# Patient Record
Sex: FEMALE | Race: WHITE | NOT HISPANIC OR LATINO | Employment: OTHER | URBAN - METROPOLITAN AREA
[De-identification: names, ages, dates, MRNs, and addresses within clinical notes are randomized per-mention and may not be internally consistent; named-entity substitution may affect disease eponyms.]

---

## 2017-08-17 ENCOUNTER — ALLSCRIPTS OFFICE VISIT (OUTPATIENT)
Dept: OTHER | Facility: OTHER | Age: 67
End: 2017-08-17

## 2017-09-15 ENCOUNTER — ALLSCRIPTS OFFICE VISIT (OUTPATIENT)
Dept: OTHER | Facility: OTHER | Age: 67
End: 2017-09-15

## 2018-01-13 VITALS
DIASTOLIC BLOOD PRESSURE: 76 MMHG | BODY MASS INDEX: 26.8 KG/M2 | RESPIRATION RATE: 16 BRPM | SYSTOLIC BLOOD PRESSURE: 104 MMHG | TEMPERATURE: 97.1 F | WEIGHT: 157 LBS | HEIGHT: 64 IN | HEART RATE: 76 BPM

## 2018-01-15 VITALS
BODY MASS INDEX: 26.46 KG/M2 | DIASTOLIC BLOOD PRESSURE: 70 MMHG | RESPIRATION RATE: 16 BRPM | OXYGEN SATURATION: 98 % | SYSTOLIC BLOOD PRESSURE: 102 MMHG | HEART RATE: 64 BPM | TEMPERATURE: 98 F | HEIGHT: 64 IN | WEIGHT: 155 LBS

## 2018-06-12 ENCOUNTER — OFFICE VISIT (OUTPATIENT)
Dept: FAMILY MEDICINE CLINIC | Facility: CLINIC | Age: 68
End: 2018-06-12
Payer: MEDICARE

## 2018-06-12 VITALS
HEIGHT: 64 IN | SYSTOLIC BLOOD PRESSURE: 110 MMHG | BODY MASS INDEX: 24.59 KG/M2 | DIASTOLIC BLOOD PRESSURE: 70 MMHG | WEIGHT: 144 LBS | HEART RATE: 74 BPM | TEMPERATURE: 97 F | RESPIRATION RATE: 16 BRPM

## 2018-06-12 DIAGNOSIS — M79.89 MASS OF SOFT TISSUE: Primary | ICD-10-CM

## 2018-06-12 PROBLEM — I47.1 SVT (SUPRAVENTRICULAR TACHYCARDIA) (HCC): Status: ACTIVE | Noted: 2018-06-12

## 2018-06-12 PROBLEM — I47.10 SVT (SUPRAVENTRICULAR TACHYCARDIA): Status: ACTIVE | Noted: 2018-06-12

## 2018-06-12 PROBLEM — E03.9 HYPOTHYROIDISM: Status: ACTIVE | Noted: 2017-09-15

## 2018-06-12 PROBLEM — M70.62 GREATER TROCHANTERIC BURSITIS OF LEFT HIP: Status: ACTIVE | Noted: 2017-09-15

## 2018-06-12 PROCEDURE — 99214 OFFICE O/P EST MOD 30 MIN: CPT | Performed by: NURSE PRACTITIONER

## 2018-06-12 RX ORDER — NICOTINE POLACRILEX 2 MG
1 GUM BUCCAL DAILY
COMMUNITY

## 2018-06-12 RX ORDER — FLECAINIDE ACETATE 50 MG/1
TABLET ORAL
COMMUNITY
End: 2019-05-28

## 2018-06-12 RX ORDER — ALPRAZOLAM 0.25 MG/1
TABLET ORAL
COMMUNITY

## 2018-06-12 RX ORDER — LEVOTHYROXINE SODIUM 0.1 MG/1
1 TABLET ORAL DAILY
COMMUNITY
End: 2018-09-04 | Stop reason: SDUPTHER

## 2018-06-12 NOTE — PROGRESS NOTES
Assessment/Plan:    US ordered for further evaluation  Differentials include lipoma, cyst, malignancy    Problem List Items Addressed This Visit     None      Visit Diagnoses     Mass of soft tissue    -  Primary    Relevant Orders    US superficial lump (non extremity)          There are no Patient Instructions on file for this visit  Return if symptoms worsen or fail to improve  Subjective:      Patient ID: Karel Bales is a 69 y o  female  Chief Complaint   Patient presents with    Pulled Muscle     pt sts pulled muscle in her chest area  drhlpn       She choked on a french novoa back in February and her  performed the Heimlich maneuver, but grabbed her too high on her chest   Since then, she has had soreness in the muscles of her chest  They feel better to stretch her arms  Last week she noticed a lump on her chest wall that is not painful  She has been Tylenol OTC for muscle pain  The following portions of the patient's history were reviewed and updated as appropriate: allergies, current medications, past family history, past medical history, past social history, past surgical history and problem list     Review of Systems   Constitutional: Negative  Musculoskeletal:        See HPI   Skin:        See HPI   All other systems reviewed and are negative  Current Outpatient Prescriptions   Medication Sig Dispense Refill    ALPRAZolam (XANAX) 0 25 mg tablet Take by mouth daily at bedtime as needed for anxiety      bimatoprost (LUMIGAN) 0 01 % ophthalmic drops Apply 1 drop to eye Daily      Biotin 1 MG CAPS Take 1 capsule by mouth daily      flecainide (TAMBOCOR) 50 mg tablet Take by mouth        levothyroxine (SYNTHROID) 100 mcg tablet Take 1 tablet by mouth daily       No current facility-administered medications for this visit          Objective:    /70   Pulse 74   Temp (!) 97 °F (36 1 °C)   Resp 16   Ht 5' 4" (1 626 m)   Wt 65 3 kg (144 lb)   LMP 01/20/2000 BMI 24 72 kg/m²        Physical Exam   Constitutional: She appears well-developed and well-nourished  HENT:   Right Ear: Tympanic membrane, external ear and ear canal normal    Left Ear: Tympanic membrane, external ear and ear canal normal    Nose: No mucosal edema  Mouth/Throat: Oropharynx is clear and moist and mucous membranes are normal    Eyes: Conjunctivae are normal    Cardiovascular: Normal rate, regular rhythm and normal heart sounds  Pulmonary/Chest: Effort normal and breath sounds normal    Abdominal: Bowel sounds are normal  She exhibits no distension  There is no splenomegaly or hepatomegaly  There is no tenderness  Lymphadenopathy:        Right cervical: No superficial cervical adenopathy present  Left cervical: No superficial cervical adenopathy present  Skin: No rash noted  Psychiatric: She has a normal mood and affect  Nursing note and vitals reviewed               Kassandra Torres

## 2018-06-13 ENCOUNTER — HOSPITAL ENCOUNTER (OUTPATIENT)
Dept: RADIOLOGY | Facility: HOSPITAL | Age: 68
Discharge: HOME | End: 2018-06-13
Payer: MEDICARE

## 2018-06-13 DIAGNOSIS — M79.89 MASS OF SOFT TISSUE: ICD-10-CM

## 2018-06-13 PROCEDURE — 76705 ECHO EXAM OF ABDOMEN: CPT

## 2018-06-14 ENCOUNTER — TELEPHONE (OUTPATIENT)
Dept: FAMILY MEDICINE CLINIC | Facility: CLINIC | Age: 68
End: 2018-06-14

## 2018-06-14 NOTE — TELEPHONE ENCOUNTER
Spoke w/ pt, aware of US results  Discussed doing a CT for further eval, however she is does not want to go forward with that  No constitutional symptoms, no other masses  State she had labs done in [place] and CBC was normal   Recommended further imaging if this persists after a month or if swelling grows in size or for new masses, fever, night sweats, appetite changes, or weight loss   Anusha Araya

## 2018-08-17 ENCOUNTER — HOSPITAL ENCOUNTER (EMERGENCY)
Facility: HOSPITAL | Age: 68
Discharge: HOME | End: 2018-08-17
Attending: EMERGENCY MEDICINE | Admitting: EMERGENCY MEDICINE
Payer: MEDICARE

## 2018-08-17 ENCOUNTER — APPOINTMENT (EMERGENCY)
Dept: RADIOLOGY | Facility: HOSPITAL | Age: 68
End: 2018-08-17
Payer: MEDICARE

## 2018-08-17 VITALS
DIASTOLIC BLOOD PRESSURE: 72 MMHG | WEIGHT: 148 LBS | HEART RATE: 105 BPM | BODY MASS INDEX: 25.4 KG/M2 | SYSTOLIC BLOOD PRESSURE: 116 MMHG | TEMPERATURE: 96.6 F | RESPIRATION RATE: 37 BRPM | OXYGEN SATURATION: 96 %

## 2018-08-17 DIAGNOSIS — I47.1 SVT (SUPRAVENTRICULAR TACHYCARDIA) (HCC): Primary | ICD-10-CM

## 2018-08-17 LAB
ALBUMIN SERPL BCP-MCNC: 3.9 G/DL (ref 3.5–5)
ALP SERPL-CCNC: 104 U/L (ref 46–116)
ALT SERPL W P-5'-P-CCNC: 28 U/L (ref 12–78)
ANION GAP SERPL CALCULATED.3IONS-SCNC: 10 MMOL/L (ref 4–13)
APTT PPP: 26 SECONDS (ref 24–33)
AST SERPL W P-5'-P-CCNC: 22 U/L (ref 5–45)
BASOPHILS # BLD AUTO: 0.05 THOUSANDS/ΜL (ref 0–0.1)
BASOPHILS NFR BLD AUTO: 1 % (ref 0–1)
BILIRUB SERPL-MCNC: 0.3 MG/DL (ref 0.2–1)
BILIRUB UR QL STRIP: NEGATIVE
BUN SERPL-MCNC: 15 MG/DL (ref 5–25)
CALCIUM SERPL-MCNC: 9.8 MG/DL (ref 8.3–10.1)
CHLORIDE SERPL-SCNC: 106 MMOL/L (ref 100–108)
CLARITY UR: CLEAR
CO2 SERPL-SCNC: 28 MMOL/L (ref 21–32)
COLOR UR: COLORLESS
CREAT SERPL-MCNC: 0.78 MG/DL (ref 0.6–1.3)
EOSINOPHIL # BLD AUTO: 0.19 THOUSAND/ΜL (ref 0–0.61)
EOSINOPHIL NFR BLD AUTO: 3 % (ref 0–6)
ERYTHROCYTE [DISTWIDTH] IN BLOOD BY AUTOMATED COUNT: 11.8 % (ref 11.6–15.1)
GFR SERPL CREATININE-BSD FRML MDRD: 78 ML/MIN/1.73SQ M
GLUCOSE SERPL-MCNC: 102 MG/DL (ref 65–140)
GLUCOSE UR STRIP-MCNC: NEGATIVE MG/DL
HCT VFR BLD AUTO: 45 % (ref 34.8–46.1)
HGB BLD-MCNC: 14.9 G/DL (ref 11.5–15.4)
HGB UR QL STRIP.AUTO: NEGATIVE
IMM GRANULOCYTES # BLD AUTO: 0.01 THOUSAND/UL (ref 0–0.2)
IMM GRANULOCYTES NFR BLD AUTO: 0 % (ref 0–2)
INR PPP: 0.96 (ref 0.86–1.16)
KETONES UR STRIP-MCNC: NEGATIVE MG/DL
LEUKOCYTE ESTERASE UR QL STRIP: NEGATIVE
LYMPHOCYTES # BLD AUTO: 3.06 THOUSANDS/ΜL (ref 0.6–4.47)
LYMPHOCYTES NFR BLD AUTO: 43 % (ref 14–44)
MAGNESIUM SERPL-MCNC: 2.1 MG/DL (ref 1.6–2.6)
MCH RBC QN AUTO: 31.4 PG (ref 26.8–34.3)
MCHC RBC AUTO-ENTMCNC: 33.1 G/DL (ref 31.4–37.4)
MCV RBC AUTO: 95 FL (ref 82–98)
MONOCYTES # BLD AUTO: 0.72 THOUSAND/ΜL (ref 0.17–1.22)
MONOCYTES NFR BLD AUTO: 10 % (ref 4–12)
NEUTROPHILS # BLD AUTO: 3 THOUSANDS/ΜL (ref 1.85–7.62)
NEUTS SEG NFR BLD AUTO: 43 % (ref 43–75)
NITRITE UR QL STRIP: NEGATIVE
NRBC BLD AUTO-RTO: 0 /100 WBCS
NT-PROBNP SERPL-MCNC: 411 PG/ML
PH UR STRIP.AUTO: 7 [PH] (ref 5–9)
PLATELET # BLD AUTO: 276 THOUSANDS/UL (ref 149–390)
PMV BLD AUTO: 10.6 FL (ref 8.9–12.7)
POTASSIUM SERPL-SCNC: 4.3 MMOL/L (ref 3.5–5.3)
PROT SERPL-MCNC: 7.4 G/DL (ref 6.4–8.2)
PROT UR STRIP-MCNC: NEGATIVE MG/DL
PROTHROMBIN TIME: 10.1 SECONDS (ref 9.4–11.7)
RBC # BLD AUTO: 4.75 MILLION/UL (ref 3.81–5.12)
SODIUM SERPL-SCNC: 144 MMOL/L (ref 136–145)
SP GR UR STRIP.AUTO: 1.01 (ref 1–1.03)
TROPONIN I SERPL-MCNC: <0.02 NG/ML
TSH SERPL DL<=0.05 MIU/L-ACNC: 0.11 UIU/ML (ref 0.36–3.74)
UROBILINOGEN UR QL STRIP.AUTO: 0.2 E.U./DL
WBC # BLD AUTO: 7.03 THOUSAND/UL (ref 4.31–10.16)

## 2018-08-17 PROCEDURE — 71045 X-RAY EXAM CHEST 1 VIEW: CPT

## 2018-08-17 PROCEDURE — 83880 ASSAY OF NATRIURETIC PEPTIDE: CPT | Performed by: EMERGENCY MEDICINE

## 2018-08-17 PROCEDURE — 93005 ELECTROCARDIOGRAM TRACING: CPT

## 2018-08-17 PROCEDURE — 85610 PROTHROMBIN TIME: CPT | Performed by: EMERGENCY MEDICINE

## 2018-08-17 PROCEDURE — 84443 ASSAY THYROID STIM HORMONE: CPT | Performed by: EMERGENCY MEDICINE

## 2018-08-17 PROCEDURE — 36415 COLL VENOUS BLD VENIPUNCTURE: CPT | Performed by: EMERGENCY MEDICINE

## 2018-08-17 PROCEDURE — 99285 EMERGENCY DEPT VISIT HI MDM: CPT

## 2018-08-17 PROCEDURE — 83735 ASSAY OF MAGNESIUM: CPT | Performed by: EMERGENCY MEDICINE

## 2018-08-17 PROCEDURE — 96360 HYDRATION IV INFUSION INIT: CPT

## 2018-08-17 PROCEDURE — 96361 HYDRATE IV INFUSION ADD-ON: CPT

## 2018-08-17 PROCEDURE — 85730 THROMBOPLASTIN TIME PARTIAL: CPT | Performed by: EMERGENCY MEDICINE

## 2018-08-17 PROCEDURE — 80053 COMPREHEN METABOLIC PANEL: CPT | Performed by: EMERGENCY MEDICINE

## 2018-08-17 PROCEDURE — 85025 COMPLETE CBC W/AUTO DIFF WBC: CPT | Performed by: EMERGENCY MEDICINE

## 2018-08-17 PROCEDURE — 84484 ASSAY OF TROPONIN QUANT: CPT | Performed by: EMERGENCY MEDICINE

## 2018-08-17 PROCEDURE — 81003 URINALYSIS AUTO W/O SCOPE: CPT | Performed by: EMERGENCY MEDICINE

## 2018-08-17 RX ORDER — ALPRAZOLAM 0.25 MG/1
0.25 TABLET ORAL ONCE
Status: COMPLETED | OUTPATIENT
Start: 2018-08-17 | End: 2018-08-17

## 2018-08-17 RX ADMIN — SODIUM CHLORIDE 1000 ML: 0.9 INJECTION, SOLUTION INTRAVENOUS at 16:22

## 2018-08-17 RX ADMIN — ALPRAZOLAM 0.25 MG: 0.25 TABLET ORAL at 16:46

## 2018-08-17 NOTE — DISCHARGE INSTRUCTIONS
Supraventricular Tachycardia   WHAT YOU NEED TO KNOW:   Supraventricular tachycardia (SVT) is a condition that causes your heart to beat much faster than it should  SVT is a type of abnormal heart rhythm, called an arrhythmia, that starts in the upper part of your heart  It may last a few seconds or hours to several days  DISCHARGE INSTRUCTIONS:   Call 911 for the following:   · You have any of the following signs of a heart attack:      ¨ Squeezing, pressure, or pain in your chest that lasts longer than 5 minutes or returns    ¨ Discomfort or pain in your back, neck, jaw, stomach, or arm     ¨ Trouble breathing    ¨ Nausea or vomiting    ¨ Lightheadedness or a sudden cold sweat, especially with chest pain or trouble breathing    Return to the emergency department if:   · You have dizziness, lightheadedness, or feel faint  · You have sudden numbness or weakness in your arms or legs  Contact your healthcare provider if:   · Your symptoms get worse, or you have new symptoms  · You have swelling in your ankles or feet  · You have questions or concerns about your condition or care  Medicines:   · Medicines  can help control your heart rate and rhythm  You may need more than one medicine to treat your symptoms  · Take your medicine as directed  Contact your healthcare provider if you think your medicine is not helping or if you have side effects  Tell him or her if you are allergic to any medicine  Keep a list of the medicines, vitamins, and herbs you take  Include the amounts, and when and why you take them  Bring the list or the pill bottles to follow-up visits  Carry your medicine list with you in case of an emergency  How to manage or prevent SVT:   · Perform vagal maneuvers as directed when you have symptoms of SVT  Lie down flat and bear down like you are having a bowel movement  Do this for 10 to 30 seconds  · Do not drink caffeine or alcohol    These can increase your risk for SVT     · Keep a record of your symptoms  Write down what you ate or what you were doing before an episode of SVT  Also write down anything you did to make the SVT stop  Bring your record to follow up visits with your healthcare provider  · Eat heart-healthy foods  These include fruits, vegetables, whole-grain breads, low-fat dairy products, beans, lean meats, and fish  Replace butter and margarine with heart-healthy oils such as olive oil and canola oil  · Exercise regularly and maintain a healthy weight  Ask about the best exercise plan for you  Ask your healthcare provider how much you should weigh  Ask him to help you create a weight loss plan if you are overweight  · Do not smoke  Nicotine and other chemicals in cigarettes and cigars can cause heart and lung damage  Ask your healthcare provider for information if you currently smoke and need help to quit  E-cigarettes or smokeless tobacco still contain nicotine  Talk to your healthcare provider before you use these products  Follow up with your healthcare provider as directed:  Write down your questions so you remember to ask them during your visits  © 2017 [facility] Information is for End User's use only and may not be sold, redistributed or otherwise used for commercial purposes  All illustrations and images included in CareNotes® are the copyrighted property of A D A M , Inc  or [employer] Keith  The above information is an  only  It is not intended as medical advice for individual conditions or treatments  Talk to your doctor, nurse or pharmacist before following any medical regimen to see if it is safe and effective for you

## 2018-08-17 NOTE — ED PROVIDER NOTES
History  Chief Complaint   Patient presents with    Rapid Heart Rate     rapid heart beat at about 11am while sitting in car  hx of ablation for svt  states  took an extra Flecinide pill     58-year-old female presents with palpitations started this morning  History of SVT for which she takes flecanide and took an extra dose however did not improve her palpitations  No new medications, no alcohol use, no caffeine use  Currently denies any shortness of breath chest pain fevers chills or any other symptoms  Her cardiologist is in [place]  Patient lives here 6 months and then in [place] for the rest of the 6 months of the year  History provided by:  Patient   used: No        Prior to Admission Medications   Prescriptions Last Dose Informant Patient Reported? Taking? ALPRAZolam (XANAX) 0 25 mg tablet   Yes No   Sig: Take by mouth daily at bedtime as needed for anxiety   Biotin 1 MG CAPS   Yes No   Sig: Take 1 capsule by mouth daily   bimatoprost (LUMIGAN) 0 01 % ophthalmic drops   Yes No   Sig: Apply 1 drop to eye Daily   flecainide (TAMBOCOR) 50 mg tablet  Self Yes No   Sig: Take by mouth     levothyroxine (SYNTHROID) 100 mcg tablet   Yes No   Sig: Take 1 tablet by mouth daily      Facility-Administered Medications: None       Past Medical History:   Diagnosis Date    Disease of thyroid gland     SVT (supraventricular tachycardia) ([address] )        Past Surgical History:   Procedure Laterality Date    CARDIAC ELECTROPHYSIOLOGY STUDY AND ABLATION         History reviewed  No pertinent family history  I have reviewed and agree with the history as documented  Social History   Substance Use Topics    Smoking status: Never Smoker    Smokeless tobacco: Never Used    Alcohol use No        Review of Systems   All other systems reviewed and are negative  Physical Exam  Physical Exam   Constitutional: She is oriented to person, place, and time   She appears well-developed and well-nourished  HENT:   Head: Normocephalic and atraumatic  Eyes: EOM are normal  Pupils are equal, round, and reactive to light  Neck: Normal range of motion  Neck supple  Cardiovascular: Regular rhythm  Tachycardic   Pulmonary/Chest: Effort normal and breath sounds normal    Abdominal: Soft  Bowel sounds are normal    Musculoskeletal: Normal range of motion  Neurological: She is alert and oriented to person, place, and time  Skin: Skin is warm and dry  Psychiatric: She has a normal mood and affect  Nursing note and vitals reviewed        Vital Signs  ED Triage Vitals [08/17/18 1605]   Temperature Pulse Respirations Blood Pressure SpO2   (!) 96 6 °F (35 9 °C) (!) 128 (!) 28 (!) 173/73 99 %      Temp Source Heart Rate Source Patient Position - Orthostatic VS BP Location FiO2 (%)   Tympanic Radial Sitting Right arm --      Pain Score       No Pain           Vitals:    08/17/18 1605 08/17/18 1815   BP: (!) 173/73 116/72   Pulse: (!) 128 105   Patient Position - Orthostatic VS: Sitting        Visual Acuity      ED Medications  Medications   sodium chloride 0 9 % bolus 1,000 mL (0 mL Intravenous Stopped 8/17/18 1829)   ALPRAZolam (XANAX) tablet 0 25 mg (0 25 mg Oral Given 8/17/18 1646)       Diagnostic Studies  Results Reviewed     Procedure Component Value Units Date/Time    UA w Reflex to Microscopic [[id number]] Collected:  08/17/18 1721    Lab Status:  Final result Specimen:  Urine from Urine, Clean Catch Updated:  08/17/18 1730     Color, UA Colorless     Clarity, UA Clear     Specific Gravity, UA 1 010     pH, UA 7 0     Leukocytes, UA Negative     Nitrite, UA Negative     Protein, UA Negative mg/dl      Glucose, UA Negative mg/dl      Ketones, UA Negative mg/dl      Urobilinogen, UA 0 2 E U /dl      Bilirubin, UA Negative     Blood, UA Negative    Troponin I [[id number]]  (Normal) Collected:  08/17/18 1620    Lab Status:  Final result Specimen:  Blood from Arm, Left Updated:  08/17/18 1712 Troponin I <0 02 ng/mL     TSH [[id number]]  (Abnormal) Collected:  08/17/18 1620    Lab Status:  Final result Specimen:  Blood from Arm, Left Updated:  08/17/18 1659     TSH 3RD GENERATON 0 112 (L) uIU/mL     Narrative:         Patients undergoing fluorescein dye angiography may retain small amounts of fluorescein in the body for 48-72 hours post procedure  Samples containing fluorescein can produce falsely depressed TSH values  If the patient had this procedure,a specimen should be resubmitted post fluorescein clearance  The recommended reference ranges for TSH during pregnancy are as follows:  First trimester 0 1 to 2 5 uIU/mL  Second trimester  0 2 to 3 0 uIU/mL  Third trimester 0 3 to 3 0 uIU/m      Magnesium [[id number]]  (Normal) Collected:  08/17/18 1620    Lab Status:  Final result Specimen:  Blood from Arm, Left Updated:  08/17/18 1659     Magnesium 2 1 mg/dL     B-type natriuretic peptide [[id number]]  (Abnormal) Collected:  08/17/18 1620    Lab Status:  Final result Specimen:  Blood from Arm, Left Updated:  08/17/18 1659     NT-proBNP 411 (H) pg/mL     Comprehensive metabolic panel [[id number]] Collected:  08/17/18 1620    Lab Status:  Final result Specimen:  Blood from Arm, Left Updated:  08/17/18 1652     Sodium 144 mmol/L      Potassium 4 3 mmol/L      Chloride 106 mmol/L      CO2 28 mmol/L      Anion Gap 10 mmol/L      BUN 15 mg/dL      Creatinine 0 78 mg/dL      Glucose 102 mg/dL      Calcium 9 8 mg/dL      AST 22 U/L      ALT 28 U/L      Alkaline Phosphatase 104 U/L      Total Protein 7 4 g/dL      Albumin 3 9 g/dL      Total Bilirubin 0 30 mg/dL      eGFR 78 ml/min/1 73sq m     Narrative:         National Kidney Disease Education Program recommendations are as follows:  GFR calculation is accurate only with a steady state creatinine  Chronic Kidney disease less than 60 ml/min/1 73 sq  meters  Kidney failure less than 15 ml/min/1 73 sq  meters      Protime-INR [[id number]]  (Normal) Collected: 08/17/18 1620    Lab Status:  Final result Specimen:  Blood from Arm, Left Updated:  08/17/18 1643     Protime 10 1 seconds      INR 0 96    APTT [[id number]]  (Normal) Collected:  08/17/18 1620    Lab Status:  Final result Specimen:  Blood from Arm, Left Updated:  08/17/18 1643     PTT 26 seconds     CBC and differential [[id number]] Collected:  08/17/18 1620    Lab Status:  Final result Specimen:  Blood from Arm, Left Updated:  08/17/18 1627     WBC 7 03 Thousand/uL      RBC 4 75 Million/uL      Hemoglobin 14 9 g/dL      Hematocrit 45 0 %      MCV 95 fL      MCH 31 4 pg      MCHC 33 1 g/dL      RDW 11 8 %      MPV 10 6 fL      Platelets 625 Thousands/uL      nRBC 0 /100 WBCs      Neutrophils Relative 43 %      Immat GRANS % 0 %      Lymphocytes Relative 43 %      Monocytes Relative 10 %      Eosinophils Relative 3 %      Basophils Relative 1 %      Neutrophils Absolute 3 00 Thousands/µL      Immature Grans Absolute 0 01 Thousand/uL      Lymphocytes Absolute 3 06 Thousands/µL      Monocytes Absolute 0 72 Thousand/µL      Eosinophils Absolute 0 19 Thousand/µL      Basophils Absolute 0 05 Thousands/µL                  XR chest 1 view portable   Final Result by Kathya Hough MD (08/17 1954)      No acute cardiopulmonary disease              Workstation performed: [id number]                    Procedures  ECG 12 Lead Documentation  Performed by: Karthik Human by: Azra Carr     ECG reviewed by me, the ED Provider: yes    Patient location:  ED  Previous ECG:     Previous ECG:  Unavailable    Comparison to cardiac monitor: Yes    Interpretation:     Interpretation: abnormal    Rate:     ECG rate assessment: tachycardic    Rhythm:     Rhythm: junctional    Ectopy:     Ectopy: none    QRS:     QRS axis:  Normal  Conduction:     Conduction: normal    ST segments:     ST segments:  Non-specific  T waves:     T waves: non-specific             Phone Contacts  ED Phone Contact    ED Course MDM  Number of Diagnoses or Management Options  SVT (supraventricular tachycardia) [facility]):   Diagnosis management comments: Patient evaluated with EKG, labs, CXR  Her TSH is low  I reviewed the EKG and it is repeat with the cardiologist Dr May  He recommended follow-up in the cardiology clinic next week, and to also decrease her synthroid dose  I recommended she decrease her Synthroid dose to half and follow up with her PCP on Monday  I also recommended she return to the ED for any worsening palpitations chest pain shortness of breath or any other symptoms  The time of discharge her heart rate ranged anywhere from 105 beats per minute to 115 beats per minute and she remained asymptomatic  She verbalized understanding of instructions had no further questions the time of discharge  I did not make any changes to her flecainide dosing  Amount and/or Complexity of Data Reviewed  Clinical lab tests: ordered and reviewed  Tests in the radiology section of CPT®: reviewed and ordered  Tests in the medicine section of CPT®: ordered and reviewed    Patient Progress  Patient progress: stable    CritCare Time    Disposition  Final diagnoses:   SVT (supraventricular tachycardia) ([address] )     Time reflects when diagnosis was documented in both MDM as applicable and the Disposition within this note     Time User Action Codes Description Comment    8/17/2018  6:27 PM Anepu, Morenita Child Add [I47 1] SVT (supraventricular tachycardia) [facility])       ED Disposition     ED Disposition Condition Comment    Discharge  Nicola Dickens discharge to home/self care      Condition at discharge: Stable        Follow-up Information     Follow up With Specialties Details Why Contact Info Additional [address] Emergency Department Emergency Medicine  If symptoms worsen; cut synthroid in half and followup with PCP on monday and cardiology on monday [address] [address]  [phone] [facility], [place], [place], [address]    Anabela Soares MD Cardiology Schedule an appointment as soon as possible for a visit  Sylvainnoemi Velazquez  [phone]             Discharge Medication List as of 8/17/2018  6:33 PM      CONTINUE these medications which have NOT CHANGED    Details   ALPRAZolam (XANAX) 0 25 mg tablet Take by mouth daily at bedtime as needed for anxiety, Historical Med      bimatoprost (LUMIGAN) 0 01 % ophthalmic drops Apply 1 drop to eye Daily, Historical Med      Biotin 1 MG CAPS Take 1 capsule by mouth daily, Historical Med      flecainide (TAMBOCOR) 50 mg tablet Take by mouth  , Historical Med      levothyroxine (SYNTHROID) 100 mcg tablet Take 1 tablet by mouth daily, Historical Med           No discharge procedures on file      ED Provider  Electronically Signed by           Rob Ohara DO  08/17/18 3256

## 2018-08-20 ENCOUNTER — OFFICE VISIT (OUTPATIENT)
Dept: FAMILY MEDICINE CLINIC | Facility: CLINIC | Age: 68
End: 2018-08-20
Payer: MEDICARE

## 2018-08-20 VITALS
HEART RATE: 64 BPM | RESPIRATION RATE: 20 BRPM | DIASTOLIC BLOOD PRESSURE: 64 MMHG | WEIGHT: 149 LBS | TEMPERATURE: 97 F | BODY MASS INDEX: 25.44 KG/M2 | HEIGHT: 64 IN | SYSTOLIC BLOOD PRESSURE: 114 MMHG

## 2018-08-20 DIAGNOSIS — E03.9 HYPOTHYROIDISM, UNSPECIFIED TYPE: Primary | ICD-10-CM

## 2018-08-20 DIAGNOSIS — I47.1 SVT (SUPRAVENTRICULAR TACHYCARDIA) (HCC): ICD-10-CM

## 2018-08-20 LAB
ATRIAL RATE: 111 BPM
ATRIAL RATE: 73 BPM
QRS AXIS: 11 DEGREES
QRS AXIS: 13 DEGREES
QRSD INTERVAL: 70 MS
QRSD INTERVAL: 72 MS
QT INTERVAL: 316 MS
QT INTERVAL: 340 MS
QTC INTERVAL: 440 MS
QTC INTERVAL: 451 MS
T WAVE AXIS: 45 DEGREES
T WAVE AXIS: 56 DEGREES
VENTRICULAR RATE: 106 BPM
VENTRICULAR RATE: 117 BPM

## 2018-08-20 PROCEDURE — 93010 ELECTROCARDIOGRAM REPORT: CPT | Performed by: INTERNAL MEDICINE

## 2018-08-20 PROCEDURE — 99213 OFFICE O/P EST LOW 20 MIN: CPT | Performed by: NURSE PRACTITIONER

## 2018-08-20 NOTE — PROGRESS NOTES
Assessment/Plan:    Problem List Items Addressed This Visit        Endocrine    Hypothyroidism - Primary     Repeat labs ordered for 6 weeks  Continue 50mcg of Levothyroxine  Relevant Orders    TSH, 3rd generation    T4, free       Cardiovascular and Mediastinum    SVT (supraventricular tachycardia) (HCC)     Stable  Has f/u with cardiology later this week  There are no Patient Instructions on file for this visit  Return if symptoms worsen or fail to improve  Subjective:      Patient ID: Shawn Mills is a [address] y o  female  Chief Complaint   Patient presents with    Follow-up     [facility] LPN       Here today for ER f/u  She went to the ER with SVT  Called her cardiologist in [place], who advised a second dose of Flecainide and then to the ER if this persisted  Went to ER, TSH Was found to be low  She denies are recurrent tachycardia  She has a f/u with Dr Amanda Parsons scheduled later this week  Reports feeling well otherwise  The following portions of the patient's history were reviewed and updated as appropriate: allergies, current medications, past family history, past medical history, past social history, past surgical history and problem list     Review of Systems   Constitutional: Negative  Respiratory: Negative for cough and shortness of breath  Cardiovascular: Negative for chest pain, palpitations and leg swelling  See HPI         Current Outpatient Prescriptions   Medication Sig Dispense Refill    ALPRAZolam (XANAX) 0 25 mg tablet Take by mouth daily at bedtime as needed for anxiety      bimatoprost (LUMIGAN) 0 01 % ophthalmic drops Apply 1 drop to eye Daily      Biotin 1 MG CAPS Take 1 capsule by mouth daily      flecainide (TAMBOCOR) 50 mg tablet Take by mouth        levothyroxine (SYNTHROID) 100 mcg tablet Take 1 tablet by mouth daily 1/2 tablet daily        No current facility-administered medications for this visit          Objective:    BP 114/64   Pulse 64   Temp (!) 97 °F (36 1 °C)   Resp 20   Ht 5' 4" (1 626 m)   Wt 67 6 kg (149 lb)   LMP 01/20/2000   BMI 25 58 kg/m²        Physical Exam   Constitutional: She appears well-developed and well-nourished  HENT:   Right Ear: Tympanic membrane, external ear and ear canal normal    Left Ear: Tympanic membrane, external ear and ear canal normal    Nose: No mucosal edema  Mouth/Throat: Oropharynx is clear and moist and mucous membranes are normal    Eyes: Conjunctivae are normal    Cardiovascular: Normal rate, regular rhythm and normal heart sounds  Pulmonary/Chest: Effort normal and breath sounds normal    Abdominal: Bowel sounds are normal  She exhibits no distension  There is no splenomegaly or hepatomegaly  There is no tenderness  Lymphadenopathy:        Right cervical: No superficial cervical adenopathy present  Left cervical: No superficial cervical adenopathy present  Skin: No rash noted  Psychiatric: She has a normal mood and affect  Nursing note and vitals reviewed               Kassandra Torres

## 2018-09-04 ENCOUNTER — TELEPHONE (OUTPATIENT)
Dept: FAMILY MEDICINE CLINIC | Facility: CLINIC | Age: 68
End: 2018-09-04

## 2018-09-04 ENCOUNTER — OFFICE VISIT (OUTPATIENT)
Dept: FAMILY MEDICINE CLINIC | Facility: CLINIC | Age: 68
End: 2018-09-04
Payer: MEDICARE

## 2018-09-04 VITALS
TEMPERATURE: 96.4 F | WEIGHT: 150 LBS | DIASTOLIC BLOOD PRESSURE: 76 MMHG | HEIGHT: 64 IN | BODY MASS INDEX: 25.61 KG/M2 | RESPIRATION RATE: 16 BRPM | SYSTOLIC BLOOD PRESSURE: 120 MMHG | HEART RATE: 72 BPM

## 2018-09-04 DIAGNOSIS — E03.9 HYPOTHYROIDISM, UNSPECIFIED TYPE: ICD-10-CM

## 2018-09-04 DIAGNOSIS — E03.9 HYPOTHYROIDISM, UNSPECIFIED TYPE: Primary | ICD-10-CM

## 2018-09-04 PROCEDURE — 99213 OFFICE O/P EST LOW 20 MIN: CPT | Performed by: NURSE PRACTITIONER

## 2018-09-04 RX ORDER — LEVOTHYROXINE SODIUM 0.07 MG/1
75 TABLET ORAL DAILY
Qty: 30 TABLET | Refills: 3 | Status: SHIPPED | OUTPATIENT
Start: 2018-09-04 | End: 2018-09-04 | Stop reason: SDUPTHER

## 2018-09-04 RX ORDER — LEVOTHYROXINE SODIUM 0.07 MG/1
75 TABLET ORAL DAILY
Qty: 30 TABLET | Refills: 0 | Status: SHIPPED | OUTPATIENT
Start: 2018-09-04

## 2018-09-04 NOTE — TELEPHONE ENCOUNTER
Pharmacy called to clarify directions on levothyrozine, is it one table daily or 1/2 table  Please advise, ill call pharmacy back     adrienne

## 2018-09-04 NOTE — PROGRESS NOTES
Assessment/Plan:     Problem List Items Addressed This Visit        Endocrine    Hypothyroidism - Primary     Was previously taking 100mcg, which was dropped to 50mcg in the ER  Will increase to 75mcg and recheck labs in 6-8 weeks  Relevant Medications    levothyroxine 75 mcg tablet          Patient Instructions   Due around October 16 for repeat labs  Return if symptoms worsen or fail to improve  Subjective:      Patient ID: Julian Medley is a 69 y o  female  Chief Complaint   Patient presents with    Fatigue     weight gain-lj       Since she lowered her thyroid dose, she has been feeling more fatigue and has gained 2 pounds  There have been no changes in her diet  She otherwise feels well  She saw Dr Skip Mccann who wanted her to switch her medications  Also recommended she take Lorazepam instead of Alprazolam to help her sleep  The following portions of the patient's history were reviewed and updated as appropriate: allergies, current medications, past family history, past medical history, past social history, past surgical history and problem list     Review of Systems   Constitutional: Positive for fatigue and unexpected weight change  Negative for chills and fever  Respiratory: Negative for cough, shortness of breath and wheezing  Cardiovascular: Negative for chest pain, palpitations and leg swelling  Gastrointestinal: Negative for abdominal pain, diarrhea, nausea and vomiting  Skin: Negative for rash  Neurological: Negative for dizziness and headaches           Current Outpatient Prescriptions   Medication Sig Dispense Refill    ALPRAZolam (XANAX) 0 25 mg tablet Take by mouth daily at bedtime as needed for anxiety      bimatoprost (LUMIGAN) 0 01 % ophthalmic drops Apply 1 drop to eye Daily      Biotin 1 MG CAPS Take 1 capsule by mouth daily      flecainide (TAMBOCOR) 50 mg tablet Take by mouth        levothyroxine 75 mcg tablet Take 1 tablet (75 mcg total) by mouth daily 1/2 tablet daily 30 tablet 3     No current facility-administered medications for this visit  Objective:    /76   Pulse 72   Temp (!) 96 4 °F (35 8 °C)   Resp 16   Ht 5' 4" (1 626 m)   Wt 68 kg (150 lb)   LMP 01/20/2000   BMI 25 75 kg/m²        Physical Exam   Constitutional: She appears well-developed and well-nourished  HENT:   Right Ear: Tympanic membrane, external ear and ear canal normal    Left Ear: Tympanic membrane, external ear and ear canal normal    Nose: No mucosal edema  Mouth/Throat: Oropharynx is clear and moist and mucous membranes are normal    Eyes: Conjunctivae are normal    Cardiovascular: Normal rate, regular rhythm and normal heart sounds  Pulmonary/Chest: Effort normal and breath sounds normal    Abdominal: Bowel sounds are normal  She exhibits no distension  There is no splenomegaly or hepatomegaly  There is no tenderness  Lymphadenopathy:        Right cervical: No superficial cervical adenopathy present  Left cervical: No superficial cervical adenopathy present  Skin: No rash noted  Psychiatric: She has a normal mood and affect  Nursing note and vitals reviewed               Nigel Chaudhry

## 2018-09-04 NOTE — ASSESSMENT & PLAN NOTE
Was previously taking 100mcg, which was dropped to 50mcg in the ER  Will increase to 75mcg and recheck labs in 6-8 weeks

## 2018-09-18 ENCOUNTER — TELEPHONE (OUTPATIENT)
Dept: FAMILY MEDICINE CLINIC | Facility: CLINIC | Age: 68
End: 2018-09-18

## 2018-09-18 DIAGNOSIS — F51.01 PRIMARY INSOMNIA: Primary | ICD-10-CM

## 2018-09-18 NOTE — TELEPHONE ENCOUNTER
Pt has a RX for Lorazepam from other Dr but needs refill now  Can Dr refill or does she need an appt  She was just here recently  States she normally sees alfredo

## 2018-09-20 ENCOUNTER — TELEPHONE (OUTPATIENT)
Dept: FAMILY MEDICINE CLINIC | Facility: CLINIC | Age: 68
End: 2018-09-20

## 2018-09-20 PROBLEM — F51.01 PRIMARY INSOMNIA: Status: ACTIVE | Noted: 2018-09-20

## 2018-09-20 RX ORDER — LORAZEPAM 0.5 MG/1
0.5 TABLET ORAL
Qty: 30 TABLET | Refills: 0 | Status: SHIPPED | OUTPATIENT
Start: 2018-09-20 | End: 2019-05-28

## 2018-09-20 NOTE — TELEPHONE ENCOUNTER
Dr Alba Torres sent over a prescription for lorazepam 0 5 mg, they are having a nation back order from   They wanted to see if it is okay from  to switch her to the 1 mg because they do have that in stock   Kim Bolster, [place]

## 2018-09-20 NOTE — TELEPHONE ENCOUNTER
9/20/2018 12:43 PM Returned call to Ara Hayes and left a message on her machine to call the office  I looked at the  and Dr Paige Baer gave her #5 lorazepam 1mg a month ago  That is the only lorazepam she has had in the past year  I need more information from her regarding this medication    Katherine Wilde, DO

## 2018-09-20 NOTE — TELEPHONE ENCOUNTER
9/20/2018 12:58 PM Returned call to Annalisa  She has failed temazepam in the past and would like to continue the lorazepam   It is helping her sleep and palpitations  She already saw Dr Hartley [place]  She is moving back to [place] in November 7th  She would like to continue lorazepam at night to help her sleep  Risks and benefits of medication discussed  Since she was only using a half a pill I cut the dose down to 0 5mg      Message complete  Cristy Morrow, DO

## 2018-09-20 NOTE — TELEPHONE ENCOUNTER
Please advise since Reina Crandall is not here the remainder of this week  We will confirm a pharmacy if you approve refilling this for her   [facility]

## 2018-09-21 NOTE — TELEPHONE ENCOUNTER
Called pharmacy to verify, gave verbal to pharmacy     Calling patient to make aware this was done and only 1/2 tab

## 2018-11-08 ENCOUNTER — TELEPHONE (OUTPATIENT)
Dept: ADMINISTRATIVE | Facility: OTHER | Age: 68
End: 2018-11-08

## 2018-11-08 NOTE — TELEPHONE ENCOUNTER
I have mailed a Release of Health Information form for patient to return to me or to [facility]  When pt saw Phyllis Randhawa on October 4, pt provided information that her most recent mammogram and colonoscopy were done with Dr Zita Murrell in [place], [place]  Dr [facility] office will not release these records to us without a signed release form  If pt returns the form to [facility], please send this interoffice to me at 2nd Floor, Jannet Ormond  Thank you

## 2018-11-28 ENCOUNTER — APPOINTMENT (EMERGENCY)
Dept: RADIOLOGY | Facility: HOSPITAL | Age: 68
End: 2018-11-28
Payer: MEDICARE

## 2018-11-28 ENCOUNTER — HOSPITAL ENCOUNTER (OUTPATIENT)
Facility: HOSPITAL | Age: 68
Setting detail: OBSERVATION
Discharge: HOME | End: 2018-11-29
Attending: EMERGENCY MEDICINE | Admitting: FAMILY MEDICINE
Payer: MEDICARE

## 2018-11-28 DIAGNOSIS — R00.1 BRADYCARDIA WITH 31-40 BEATS PER MINUTE: Primary | ICD-10-CM

## 2018-11-28 DIAGNOSIS — I95.9 ACUTE HYPOTENSION: ICD-10-CM

## 2018-11-28 LAB
ALBUMIN SERPL BCP-MCNC: 3.9 G/DL (ref 3.5–5)
ALP SERPL-CCNC: 102 U/L (ref 46–116)
ALT SERPL W P-5'-P-CCNC: 26 U/L (ref 12–78)
ANION GAP SERPL CALCULATED.3IONS-SCNC: 12 MMOL/L (ref 4–13)
AST SERPL W P-5'-P-CCNC: 18 U/L (ref 5–45)
BASOPHILS # BLD AUTO: 0.04 THOUSANDS/ΜL (ref 0–0.1)
BASOPHILS NFR BLD AUTO: 1 % (ref 0–1)
BILIRUB SERPL-MCNC: 0.5 MG/DL (ref 0.2–1)
BUN SERPL-MCNC: 14 MG/DL (ref 5–25)
CALCIUM SERPL-MCNC: 9.6 MG/DL (ref 8.3–10.1)
CHLORIDE SERPL-SCNC: 108 MMOL/L (ref 100–108)
CO2 SERPL-SCNC: 26 MMOL/L (ref 21–32)
CREAT SERPL-MCNC: 0.74 MG/DL (ref 0.6–1.3)
EOSINOPHIL # BLD AUTO: 0.09 THOUSAND/ΜL (ref 0–0.61)
EOSINOPHIL NFR BLD AUTO: 2 % (ref 0–6)
ERYTHROCYTE [DISTWIDTH] IN BLOOD BY AUTOMATED COUNT: 11.7 % (ref 11.6–15.1)
GFR SERPL CREATININE-BSD FRML MDRD: 82 ML/MIN/1.73SQ M
GLUCOSE SERPL-MCNC: 111 MG/DL (ref 65–140)
HCT VFR BLD AUTO: 45.2 % (ref 34.8–46.1)
HGB BLD-MCNC: 14.9 G/DL (ref 11.5–15.4)
IMM GRANULOCYTES # BLD AUTO: 0.01 THOUSAND/UL (ref 0–0.2)
IMM GRANULOCYTES NFR BLD AUTO: 0 % (ref 0–2)
LYMPHOCYTES # BLD AUTO: 2.19 THOUSANDS/ΜL (ref 0.6–4.47)
LYMPHOCYTES NFR BLD AUTO: 45 % (ref 14–44)
MCH RBC QN AUTO: 31.2 PG (ref 26.8–34.3)
MCHC RBC AUTO-ENTMCNC: 33 G/DL (ref 31.4–37.4)
MCV RBC AUTO: 95 FL (ref 82–98)
MONOCYTES # BLD AUTO: 0.45 THOUSAND/ΜL (ref 0.17–1.22)
MONOCYTES NFR BLD AUTO: 10 % (ref 4–12)
NEUTROPHILS # BLD AUTO: 1.97 THOUSANDS/ΜL (ref 1.85–7.62)
NEUTS SEG NFR BLD AUTO: 42 % (ref 43–75)
NRBC BLD AUTO-RTO: 0 /100 WBCS
NT-PROBNP SERPL-MCNC: 259 PG/ML
PLATELET # BLD AUTO: 249 THOUSANDS/UL (ref 149–390)
PMV BLD AUTO: 10.2 FL (ref 8.9–12.7)
POTASSIUM SERPL-SCNC: 3.5 MMOL/L (ref 3.5–5.3)
PROT SERPL-MCNC: 7.4 G/DL (ref 6.4–8.2)
RBC # BLD AUTO: 4.78 MILLION/UL (ref 3.81–5.12)
SODIUM SERPL-SCNC: 146 MMOL/L (ref 136–145)
TROPONIN I SERPL-MCNC: <0.02 NG/ML
TSH SERPL DL<=0.05 MIU/L-ACNC: 0.25 UIU/ML (ref 0.36–3.74)
WBC # BLD AUTO: 4.75 THOUSAND/UL (ref 4.31–10.16)

## 2018-11-28 PROCEDURE — 93005 ELECTROCARDIOGRAM TRACING: CPT

## 2018-11-28 PROCEDURE — 1123F ACP DISCUSS/DSCN MKR DOCD: CPT | Performed by: INTERNAL MEDICINE

## 2018-11-28 PROCEDURE — 80053 COMPREHEN METABOLIC PANEL: CPT

## 2018-11-28 PROCEDURE — 85025 COMPLETE CBC W/AUTO DIFF WBC: CPT

## 2018-11-28 PROCEDURE — 83880 ASSAY OF NATRIURETIC PEPTIDE: CPT

## 2018-11-28 PROCEDURE — 84484 ASSAY OF TROPONIN QUANT: CPT

## 2018-11-28 PROCEDURE — 99285 EMERGENCY DEPT VISIT HI MDM: CPT

## 2018-11-28 PROCEDURE — 87081 CULTURE SCREEN ONLY: CPT | Performed by: NURSE PRACTITIONER

## 2018-11-28 PROCEDURE — 71045 X-RAY EXAM CHEST 1 VIEW: CPT

## 2018-11-28 PROCEDURE — 96374 THER/PROPH/DIAG INJ IV PUSH: CPT

## 2018-11-28 PROCEDURE — 99204 OFFICE O/P NEW MOD 45 MIN: CPT | Performed by: INTERNAL MEDICINE

## 2018-11-28 PROCEDURE — 99226 PR SBSQ OBSERVATION CARE/DAY 35 MINUTES: CPT | Performed by: NURSE PRACTITIONER

## 2018-11-28 PROCEDURE — 96361 HYDRATE IV INFUSION ADD-ON: CPT

## 2018-11-28 PROCEDURE — 84443 ASSAY THYROID STIM HORMONE: CPT

## 2018-11-28 PROCEDURE — 36415 COLL VENOUS BLD VENIPUNCTURE: CPT

## 2018-11-28 RX ORDER — ACETAMINOPHEN 325 MG/1
650 TABLET ORAL EVERY 6 HOURS PRN
Status: DISCONTINUED | OUTPATIENT
Start: 2018-11-28 | End: 2018-11-29 | Stop reason: HOSPADM

## 2018-11-28 RX ORDER — POTASSIUM CHLORIDE 20 MEQ/1
40 TABLET, EXTENDED RELEASE ORAL ONCE
Status: DISCONTINUED | OUTPATIENT
Start: 2018-11-28 | End: 2018-11-29 | Stop reason: HOSPADM

## 2018-11-28 RX ORDER — ONDANSETRON 2 MG/ML
4 INJECTION INTRAMUSCULAR; INTRAVENOUS ONCE
Status: COMPLETED | OUTPATIENT
Start: 2018-11-28 | End: 2018-11-28

## 2018-11-28 RX ORDER — LEVOTHYROXINE SODIUM 0.07 MG/1
75 TABLET ORAL
Status: DISCONTINUED | OUTPATIENT
Start: 2018-11-28 | End: 2018-11-29 | Stop reason: HOSPADM

## 2018-11-28 RX ORDER — ONDANSETRON 2 MG/ML
4 INJECTION INTRAMUSCULAR; INTRAVENOUS EVERY 6 HOURS PRN
Status: DISCONTINUED | OUTPATIENT
Start: 2018-11-28 | End: 2018-11-29 | Stop reason: HOSPADM

## 2018-11-28 RX ORDER — ALPRAZOLAM 0.25 MG/1
0.25 TABLET ORAL
Status: DISCONTINUED | OUTPATIENT
Start: 2018-11-28 | End: 2018-11-29 | Stop reason: HOSPADM

## 2018-11-28 RX ORDER — ONDANSETRON 2 MG/ML
INJECTION INTRAMUSCULAR; INTRAVENOUS
Status: DISPENSED
Start: 2018-11-28 | End: 2018-11-28

## 2018-11-28 RX ORDER — FLECAINIDE ACETATE 50 MG/1
25 TABLET ORAL EVERY 12 HOURS SCHEDULED
Status: DISCONTINUED | OUTPATIENT
Start: 2018-11-28 | End: 2018-11-29 | Stop reason: HOSPADM

## 2018-11-28 RX ADMIN — LEVOTHYROXINE SODIUM 75 MCG: 75 TABLET ORAL at 09:52

## 2018-11-28 RX ADMIN — ONDANSETRON 4 MG: 2 INJECTION INTRAMUSCULAR; INTRAVENOUS at 04:16

## 2018-11-28 RX ADMIN — SODIUM CHLORIDE 1000 ML: 0.9 INJECTION, SOLUTION INTRAVENOUS at 04:20

## 2018-11-28 RX ADMIN — FLECAINIDE ACETATE 25 MG: 50 TABLET ORAL at 20:00

## 2018-11-28 RX ADMIN — ALPRAZOLAM 0.25 MG: 0.25 TABLET ORAL at 21:43

## 2018-11-28 RX ADMIN — BIMATOPROST 1 DROP: 0.1 SOLUTION/ DROPS OPHTHALMIC at 21:40

## 2018-11-28 NOTE — ASSESSMENT & PLAN NOTE
Patient has been taking Synthroid 75 mcg p o  Daily  TSH is 0 249  Will continue Synthroid 75 mcg p o  Daily  Patient will follow up with her PCP in [place] upon discharge

## 2018-11-28 NOTE — SOCIAL WORK
PT LIVES INDEPENDENTLY WITH SPOUSE, NO DME OR HHC NEEDS, USES [facility] FOR RX NEEDS  DCP IS TO HOME WHEN STABLE  DASH discussion completed  Discussed goals of making sure pt's  needs are met upon discharge, pt's preferences are taken into account, pt understands health condition, medications and symptoms to watch for after returning home and pt is aware of any follow up appointments recommended by hospital physician

## 2018-11-28 NOTE — H&P
H&P- Helga Guzman 1950, 77 y o  female MRN: [id number]    Unit/Bed#: [address] [address] A Encounter: [id number]    Primary Care Provider: PANCHO Wallace   Date and time admitted to hospital: 11/28/2018  3:37 AM        * Bradycardia with 31-40 beats per minute   Assessment & Plan    Bradycardia noted during insertion of peripheral IV in the emergency department, most likely vasovagal response  Heart rate had dropped down into the 30s  Patient had brief loss of consciousness at that time  Heart rate return to normal sinus rhythm 60s to 80s  Approximately 1 hr after event patient was noted to be hypotensive with blood pressure of 71/48  Patient was given 1 L normal saline  Blood pressure increased 112/75  Orthostatic blood pressures ordered, no evidence of orthostatic hypotension  Telemetry ordered  Cardiology consulted  Old records from cardiologist of [place] requested  SVT (supraventricular tachycardia) [facility])   Assessment & Plan    Patient reports history of SVT in the past   Currently takes flecainide 25 mg p o  Q 12 hours  Around midnight 11/28 patient felt like her heart rate was racing  Took additional flecainide at that time  Heart rate continued to be rapid and patient presented to the emergency department around 3:30 in the morning  At that time the patient's heart rate had normalized back into 70s and 80s, and had bradycardic episode most likely vasovagal with insertion of peripheral IV  Telemetry ordered  Cardiology consulted  Old records from patient's cardiologist in [place] requested  Hypothyroidism   Assessment & Plan    Patient has been taking Synthroid 75 mcg p o  Daily  TSH is 0 249  Will continue Synthroid 75 mcg p o  Daily  Patient will follow up with her PCP in [place] upon discharge  Primary insomnia   Assessment & Plan    Patient reports insomnia secondary to usage of flecainide    Takes alprazolam 0 25 mg p o  as needed at bedtime for sleep   Will continue  VTE Prophylaxis: Enoxaparin (Lovenox)  / sequential compression device   Code Status:  Full code discussed with patient  POLST: POLST form is on file already (pre-hospital)  Discussion with family:  I have answered all questions to the best of my abilities  Anticipated Length of Stay:  Patient will be admitted on an Observation basis with an anticipated length of stay of  less than 2 midnights  Justification for Hospital Stay:  Cardiac monitoring due to episode bradycardia, and also patient had tachycardia prior to admission  Total Time for Visit, including Counseling / Coordination of Care: 1 hour  Greater than 50% of this total time spent on direct patient counseling and coordination of care  Chief Complaint:   "My heart was racing last night around midnight, and I took extra flecainide  By the time I got to the emergency room the rapid heart rate stopped"  History of Present Illness:    Demarcus Escobar is a 77 y o  female past medical history of hypothyroidism, SVT with EPS and ablation who presents with reported rapid heart rate around midnight 11/28  Patient said that this occasionally occurs and she was instructed to take an extra flecainide which the patient did  The patient reported that the heart rate still remained elevated, and at around 3:30 a m  the patient presented to the emergency department  At that time the patient's heart rate was noted to be in 70s and 80s, sinus rhythm  The patient had an episode of bradycardia in 30s during insertion of peripheral IV, most likely vasovagal   The patient had a brief interval of loss of consciousness during the procedure, and about 1 hr after episode had experienced hypotension of 71/48  The patient was given 1 L normal saline, and her blood pressure improved  The patient was admitted as an observation to telemetry  Cardiology consulted    Orthostatic blood pressures ordered, shows no signs of orthostatic hypotension  Review of Systems:    Review of Systems   Constitutional: Negative  HENT: Negative  Eyes: Negative  Respiratory: Negative for cough and shortness of breath  Cardiovascular: Positive for palpitations  Negative for chest pain and leg swelling  Gastrointestinal: Negative for abdominal distention, abdominal pain, diarrhea and nausea  Endocrine: Negative  Genitourinary: Negative  Musculoskeletal: Negative  Allergic/Immunologic: Negative  Neurological: Negative for dizziness, syncope, weakness, light-headedness and headaches  Hematological: Negative  Psychiatric/Behavioral: Negative  Past Medical and Surgical History:     Past Medical History:   Diagnosis Date    Disease of thyroid gland     SVT [facility]supraventricular tachycardia) ([facility] [address] )        Past Surgical History:   Procedure Laterality Date    BREAST LUMPECTOMY      CARDIAC ELECTROPHYSIOLOGY STUDY AND ABLATION       SECTION         Meds/Allergies:    Prior to Admission medications    Medication Sig Start Date End Date Taking? Authorizing Provider   ALPRAZolam Huizar Glance) 0 25 mg tablet Take by mouth daily at bedtime as needed for anxiety   Yes Historical Provider, MD   bimatoprost (LUMIGAN) 0 01 % ophthalmic drops Apply 1 drop to eye Daily   Yes Historical Provider, MD   Biotin 1 MG CAPS Take 1 capsule by mouth daily   Yes Historical Provider, MD   flecainide (TAMBOCOR) 50 mg tablet Take by mouth     Yes Historical Provider, MD   levothyroxine 75 mcg tablet Take 1 tablet (75 mcg total) by mouth daily  Patient taking differently: Take 100 mcg by mouth daily   18  Yes PANCHO Ac   LORazepam (ATIVAN) 0 5 mg tablet Take 1 tablet (0 5 mg total) by mouth daily at bedtime  Patient not taking: Reported on 2018   Devon Valdez, DO     I have reviewed home medications with patient personally      Allergies: No Known Allergies    Social History:     Marital Status: /Civil [employer] Occupation:  Retired  Patient Pre-hospital Living Situation:  Home with ; patient lives in [place] during summer months and in [place] for remainder of year  Patient Pre-hospital Level of Mobility:  Independent  Patient Pre-hospital Diet Restrictions:  None  Substance Use History:   History   Alcohol Use No     History   Smoking Status    Never Smoker   Smokeless Tobacco    Never Used     History   Drug Use No       Family History:    History reviewed  No pertinent family history  Physical Exam:     Vitals:   Blood Pressure: 112/75 (11/28/18 0934)  Pulse: 65 (11/28/18 0934)  Temperature: (!) 96 2 °F (35 7 °C) (11/28/18 0338)  Temp Source: Tympanic (11/28/18 0338)  Respirations: 22 (11/28/18 0630)  Height: 5' 3" (160 cm) (11/28/18 0338)  Weight - Scale: 66 7 kg (147 lb) (11/28/18 0338)  SpO2: 97 % (11/28/18 0630)    Physical Exam   Constitutional: She is oriented to person, place, and time  She appears well-developed and well-nourished  No distress  HENT:   Head: Normocephalic  Eyes: Pupils are equal, round, and reactive to light  Conjunctivae and EOM are normal    Neck: Normal range of motion  Cardiovascular: Normal rate, regular rhythm and normal heart sounds  Exam reveals no friction rub  No murmur heard  Pulmonary/Chest: Effort normal and breath sounds normal  No respiratory distress  Abdominal: Soft  Bowel sounds are normal  She exhibits no distension  There is no tenderness  Musculoskeletal: Normal range of motion  Neurological: She is alert and oriented to person, place, and time  She has normal reflexes  Skin: Skin is warm and dry  Psychiatric: She has a normal mood and affect  Her behavior is normal  Judgment and thought content normal              Additional Data:     Lab Results: I have personally reviewed pertinent reports          Results from last 7 days  Lab Units 11/28/18  0351   WBC Thousand/uL 4 75   HEMOGLOBIN g/dL 14 9   HEMATOCRIT % 45 2   PLATELETS Thousands/uL 249   NEUTROS PCT % 42*   LYMPHS PCT % 45*   MONOS PCT % 10   EOS PCT % 2       Results from last 7 days  Lab Units 11/28/18  0351   POTASSIUM mmol/L 3 5   CHLORIDE mmol/L 108   CO2 mmol/L 26   BUN mg/dL 14   CREATININE mg/dL 0 74   CALCIUM mg/dL 9 6   ALK PHOS U/L 102   ALT U/L 26   AST U/L 18                   Imaging: No imaging done  X-ray chest 1 view portable   Final Result by Nikko Henson MD (11/28 1672)      No acute cardiopulmonary disease  Workstation performed: [id number]             EKG, Pathology, and Other Studies Reviewed on Admission:   · EKG:  Rhythm strip shows sinus rhythm  Allscripts / Epic Records Reviewed: Yes     ** Please Note: This note has been constructed using a voice recognition system   **

## 2018-11-28 NOTE — ASSESSMENT & PLAN NOTE
Bradycardia noted during insertion of peripheral IV in the emergency department, most likely vasovagal response  Heart rate had dropped down into the 30s  Patient had brief loss of consciousness at that time  Heart rate return to normal sinus rhythm 60s to 80s  Approximately 1 hr after event patient was noted to be hypotensive with blood pressure of 71/48  Patient was given 1 L normal saline  Blood pressure increased 112/75  Orthostatic blood pressures ordered, no evidence of orthostatic hypotension  Telemetry ordered  Cardiology consulted  Old records from cardiologist of [place] requested

## 2018-11-28 NOTE — ASSESSMENT & PLAN NOTE
Patient reports insomnia secondary to usage of flecainide  Takes alprazolam 0 25 mg p o  as needed at bedtime for sleep  Will continue

## 2018-11-28 NOTE — ASSESSMENT & PLAN NOTE
Patient reports history of SVT in the past   Currently takes flecainide 25 mg p o  Q 12 hours  Around midnight 11/28 patient felt like her heart rate was racing  Took additional flecainide at that time  Heart rate continued to be rapid and patient presented to the emergency department around 3:30 in the morning  At that time the patient's heart rate had normalized back into 70s and 80s, and had bradycardic episode most likely vasovagal with insertion of peripheral IV  Telemetry ordered  Cardiology consulted  Old records from patient's cardiologist in [place] requested

## 2018-11-28 NOTE — CONSULTS
Consultation - Cardiology   Henry Sagastume 77 y o  female MRN: [id number]  Unit/Bed#: [address]  A Encounter: [id number]    Assessment/Plan     Assessment:  1  Recurrent SVT despite use of flecainide  2  Bradycardia most likely secondary vasovagal due to IV stick  3  Insomnia which she feels is related to her flecainide usage  4  Hypothyroidism    Plan:  Patient has been admitted to the hospitalist service  1  Will continue her current dose of flecainide until patient makes decision regarding any medication changes  As noted below if patient would convert to pill in the pocket therapy she would require 1st dose in a monitored setting  2  Continue monitor telemetry for any recurrence of SVT  3  Attempt to limit caffeine or other stimulants, which patient states she does  4  Patient lives 6 months of the year in [place] and at this time is planning return within the next day or 2  Would probably be more judicial, if she does not start any new therapy until she stabbed issues care with new cardiologist   But we will give her any information she requires  History of Present Illness   Physician Requesting Consult: Jo Ann Martines MD  Reason for Consult / Principal Problem:  Palpitations  HPI: Henry Sagastume is a 77y o  year old female who states that she has had difficulty with palpitations for approximately 20 years (since she went through menopause)  Patient has had an SVT ablation in the past, approximately 4 years ago in [place]  She states the day after her ablation, she was back at the specialist's office in [place]  In the past she had been on metoprolol, but does not remember why that was discontinued and is currently taking flecainide 25 mg twice a day  She has been told by her cardiologist in [place] that if needed she may take extra flecainide for palpitations, not to exceed 100 mg per day  She states despite being on daily flecainide she averages in SVT breakthrough every 3 months    In the past she has been offered repeat ablation which she declined  Patient noted last evening, that approximately midnight she woke with rapid heart rate  She took flecainide 25 mg (extra dose) with minimal effect  She then decided to come to the emergency room for evaluation  Upon arrival in the emergency room patient's heart rhythm resolved  Unfortunately do not have any EKG of this event  Patient was seen in August of 2018 at that time 12 lead EKG demonstrated accelerated junctional tachycardia rates of 120s  Patient is curious as to whether there are other options such as pill in the pocket propafenone or and other antiarrhythmic agent that she can try  Did discuss with the patient that there are some agent such as sotalol that can be taken on a daily basis, but this would require a 2-3 day admission to monitor her QTC interval   We also discussed the any pill in the pocket therapy would require her initial dose to be given in an emergency room setting where she can be monitored to ensure that 1  It effectively converse her and 2  She does not have any unwanted arrhythmias  Patient also notes that when she returns to [place] she has a appointment scheduled with a new heart specialist     Patient also noted to have a period of bradycardia with heart rates down to the 30s with coincided with nursing staff placing a peripheral IV  Unfortunately no strips of this rhythm were recorded  Patient had concomitant bradycardia with diaphoresis during this episode which was relieved with being placed flat  Patient's heart rates have been 62s and 70s since admission  Inpatient consult to Cardiology  Consult performed by: David Sebastian ordered by: Wayne Bella          Review of Systems   Constitutional: Negative  HENT: Negative  Eyes: Negative  Respiratory: Negative  Cardiovascular: Negative  Gastrointestinal: Negative  Endocrine: Negative  Genitourinary: Negative      Musculoskeletal: Negative  Skin: Negative  Allergic/Immunologic: Negative  Neurological: Negative  Hematological: Negative  Psychiatric/Behavioral: Negative  Historical Information   Past Medical History:   Diagnosis Date    Disease of thyroid gland     SVT (supraventricular tachycardia) (HCC)      Past Surgical History:   Procedure Laterality Date    BREAST LUMPECTOMY      CARDIAC ELECTROPHYSIOLOGY STUDY AND ABLATION       SECTION       History   Alcohol Use No     History   Drug Use No     History   Smoking Status    Never Smoker   Smokeless Tobacco    Never Used     Family History: History reviewed  No pertinent family history  Meds/Allergies   all current active meds have been reviewed, current meds:   Current Facility-Administered Medications   Medication Dose Route Frequency    acetaminophen (TYLENOL) tablet 650 mg  650 mg Oral Q6H PRN    ALPRAZolam (XANAX) tablet 0 25 mg  0 25 mg Oral HS PRN    bimatoprost (LUMIGAN) 0 01 % ophthalmic solution 1 drop  1 drop Both Eyes HS    enoxaparin (LOVENOX) subcutaneous injection 40 mg  40 mg Subcutaneous Daily    flecainide (TAMBOCOR) tablet 25 mg  25 mg Oral Q12H MONICA    levothyroxine tablet 75 mcg  75 mcg Oral Early Morning    ondansetron (ZOFRAN) injection 4 mg  4 mg Intravenous Q6H PRN    potassium chloride (K-DUR,KLOR-CON) CR tablet 40 mEq  40 mEq Oral Once    and PTA meds:   Prior to Admission Medications   Prescriptions Last Dose Informant Patient Reported? Taking?    ALPRAZolam (XANAX) 0 25 mg tablet 2018 at Unknown time  Yes Yes   Sig: Take by mouth daily at bedtime as needed for anxiety   Biotin 1 MG CAPS 2018 at Unknown time  Yes Yes   Sig: Take 1 capsule by mouth daily   LORazepam (ATIVAN) 0 5 mg tablet Not Taking at Unknown time  No No   Sig: Take 1 tablet (0 5 mg total) by mouth daily at bedtime   Patient not taking: Reported on 2018    bimatoprost (LUMIGAN) 0 01 % ophthalmic drops 2018 at Unknown time Yes Yes   Sig: Apply 1 drop to eye Daily   flecainide (TAMBOCOR) 50 mg tablet 11/28/2018 at Unknown time Self Yes Yes   Sig: Take by mouth     levothyroxine 75 mcg tablet 11/27/2018 at Unknown time  No Yes   Sig: Take 1 tablet (75 mcg total) by mouth daily   Patient taking differently: Take 100 mcg by mouth daily        Facility-Administered Medications: None     No Known Allergies    Objective   Vitals: Blood pressure 112/75, pulse 65, temperature (!) 96 2 °F (35 7 °C), temperature source Tympanic, resp  rate 22, height 5' 3" (1 6 m), weight 66 7 kg (147 lb), last menstrual period 01/20/2000, SpO2 97 %  Orthostatic Blood Pressures      Most Recent Value   Blood Pressure  112/75 filed at 11/28/2018 2791   Patient Position - Orthostatic VS  Standing - Orthostatic VS filed at 11/28/2018 0934          No intake or output data in the 24 hours ending 11/28/18 1333    Invasive Devices     Peripheral Intravenous Line            Peripheral IV 11/28/18 Left Antecubital less than 1 day                Physical Exam   Constitutional: She is oriented to person, place, and time  She appears well-developed and well-nourished  No distress  HENT:   Head: Normocephalic and atraumatic  Eyes: Pupils are equal, round, and reactive to light  Right eye exhibits no discharge  Left eye exhibits no discharge  Neck: Normal range of motion  Neck supple  No JVD present  No thyromegaly present  Cardiovascular: Normal rate, regular rhythm and normal heart sounds  No murmur heard  Pulmonary/Chest: Effort normal and breath sounds normal  No respiratory distress  She has no wheezes  She has no rales  Abdominal: Soft  Bowel sounds are normal    Musculoskeletal: She exhibits no edema  Neurological: She is alert and oriented to person, place, and time  Skin: Skin is warm and dry  She is not diaphoretic  Psychiatric: She has a normal mood and affect  Nursing note and vitals reviewed        Lab Results:   I have personally reviewed pertinent lab results  CBC with diff:   Results from last 7 days  Lab Units 18  0351   WBC Thousand/uL 4 75   RBC Million/uL 4 78   HEMOGLOBIN g/dL 14 9   HEMATOCRIT % 45 2   MCV fL 95   MCH pg 31 2   MCHC g/dL 33 0   RDW % 11 7   MPV fL 10 2   PLATELETS Thousands/uL 249     CMP:   Results from last 7 days  Lab Units 18  0351   SODIUM mmol/L 146*   POTASSIUM mmol/L 3 5   CHLORIDE mmol/L 108   CO2 mmol/L 26   BUN mg/dL 14   CREATININE mg/dL 0 74   CALCIUM mg/dL 9 6   AST U/L 18   ALT U/L 26   ALK PHOS U/L 102   EGFR ml/min/1 73sq m 82     Troponin:   0  Lab Value Date/Time   TROPONINI <0 02 2018 0351   TROPONINI <0 02 2018 1620     BNP:   Results from last 7 days  Lab Units 18  0351   POTASSIUM mmol/L 3 5   CHLORIDE mmol/L 108   CO2 mmol/L 26   BUN mg/dL 14   CREATININE mg/dL 0 74   CALCIUM mg/dL 9 6   EGFR ml/min/1 73sq m 82     TSH:   Results from last 7 days  Lab Units 18  0351   TSH 3RD GENERATON uIU/mL 0 249*     Imaging: I have personally reviewed pertinent reports  EK lead EKG shows sinus rhythm  VTE Prophylaxis: Sequential compression device (Venodyne)     Code Status: Level 1 - Full Code  Advance Directive and Living Will:      Power of :    POLST:      Counseling / Coordination of Care  Total floor / unit time spent today 60 minutes  Greater than 50% of total time was spent with the patient and / or family counseling and / or coordination of care  A description of the counseling / coordination of care:  PSVT

## 2018-11-28 NOTE — ED PROVIDER NOTES
History  Chief Complaint   Patient presents with    Palpitations     Pt complaining of palpitations since 12 this afternoon     Pt in ER with c/o palpitations that began at approx 12midnight  She has a hx of SVT, awakened to a pulse of 120 and took an extra 25mg of flecainide  Symptoms persisted and she took a 2nd dose of 25mg at [address] prior to coming to the ER  Pt drove to the ER and was asymptomatic on arrival  Pt with sudden onset of nausea/dizziness while IV was being placed  Pt subsequently bradycardic to the 30s and lost consciousness briefly  Pt awakened, returned to baseline and approx 1hr later, became hypotensive  Pt given 1L NSS, with resolution of n/d, but pt remains tremulous  She also admits to taking Xanax last night to help her sleep            Prior to Admission Medications   Prescriptions Last Dose Informant Patient Reported? Taking?    ALPRAZolam (XANAX) 0 25 mg tablet 11/27/2018 at Unknown time  Yes Yes   Sig: Take by mouth daily at bedtime as needed for anxiety   Biotin 1 MG CAPS 11/27/2018 at Unknown time  Yes Yes   Sig: Take 1 capsule by mouth daily   LORazepam (ATIVAN) 0 5 mg tablet Not Taking at Unknown time  No No   Sig: Take 1 tablet (0 5 mg total) by mouth daily at bedtime   Patient not taking: Reported on 11/28/2018    bimatoprost (LUMIGAN) 0 01 % ophthalmic drops 11/27/2018 at Unknown time  Yes Yes   Sig: Apply 1 drop to eye Daily   flecainide (TAMBOCOR) 50 mg tablet 11/28/2018 at Unknown time Self Yes Yes   Sig: Take by mouth     levothyroxine 75 mcg tablet 11/27/2018 at Unknown time  No Yes   Sig: Take 1 tablet (75 mcg total) by mouth daily   Patient taking differently: Take 100 mcg by mouth daily        Facility-Administered Medications: None       Past Medical History:   Diagnosis Date    Disease of thyroid gland     SVT (supraventricular tachycardia) (HCC)        Past Surgical History:   Procedure Laterality Date    BREAST LUMPECTOMY      CARDIAC ELECTROPHYSIOLOGY STUDY AND ABLATION       SECTION         History reviewed  No pertinent family history  I have reviewed and agree with the history as documented  Social History   Substance Use Topics    Smoking status: Never Smoker    Smokeless tobacco: Never Used    Alcohol use No        Review of Systems   Constitutional: Negative for chills and fever  Respiratory: Negative for cough, chest tightness and shortness of breath  Cardiovascular: Positive for palpitations  Negative for chest pain  Gastrointestinal: Positive for nausea  Negative for abdominal pain, diarrhea and vomiting  Genitourinary: Negative for dysuria, frequency, hematuria and urgency  Musculoskeletal: Negative for back pain, neck pain and neck stiffness  Neurological: Positive for dizziness  Negative for seizures, weakness, numbness and headaches  All other systems reviewed and are negative  Physical Exam  Physical Exam   Constitutional: She is oriented to person, place, and time  She appears well-developed and well-nourished  No distress  HENT:   Head: Normocephalic and atraumatic  Eyes: Pupils are equal, round, and reactive to light  Conjunctivae are normal    Neck: Normal range of motion  Neck supple  Cardiovascular: Regular rhythm and normal heart sounds  Bradycardia present  No murmur heard  Pulmonary/Chest: Effort normal and breath sounds normal  No respiratory distress  Abdominal: Soft  Bowel sounds are normal  She exhibits no distension  There is no tenderness  Musculoskeletal: Normal range of motion  She exhibits no edema or deformity  Neurological: She is alert and oriented to person, place, and time  No cranial nerve deficit  Skin: Skin is warm and dry  No rash noted  She is not diaphoretic  No pallor  Psychiatric: Her speech is normal and behavior is normal  Her mood appears anxious  Nursing note and vitals reviewed        Vital Signs  ED Triage Vitals   Temperature Pulse Respirations Blood Pressure SpO2 11/28/18 0338 11/28/18 0338 11/28/18 0338 11/28/18 0338 11/28/18 0338   (!) 96 2 °F (35 7 °C) 79 22 114/70 96 %      Temp Source Heart Rate Source Patient Position - Orthostatic VS BP Location FiO2 (%)   11/28/18 0338 11/28/18 0338 11/28/18 0338 11/28/18 0338 --   Tympanic Monitor Lying Right arm       Pain Score       11/28/18 0421       No Pain           Vitals:    11/28/18 0545 11/28/18 0600 11/28/18 0615 11/28/18 0630   BP: 109/64 108/65 100/67 102/68   Pulse: 64 60 62 60   Patient Position - Orthostatic VS:           Visual Acuity      ED Medications  Medications   ondansetron (ZOFRAN) injection 4 mg (4 mg Intravenous Given 11/28/18 0416)   sodium chloride 0 9 % bolus 1,000 mL (0 mL Intravenous Stopped 11/28/18 0548)       Diagnostic Studies  Results Reviewed     Procedure Component Value Units Date/Time    B-type natriuretic peptide [[id number]]  (Abnormal) Collected:  11/28/18 0351    Lab Status:  Final result Specimen:  Blood from Arm, Left Updated:  11/28/18 0428     NT-proBNP 259 (H) pg/mL     TSH [[id number]]  (Abnormal) Collected:  11/28/18 0351    Lab Status:  Final result Specimen:  Blood from Arm, Left Updated:  11/28/18 0428     TSH 3RD GENERATON 0 249 (L) uIU/mL     Narrative:         Patients undergoing fluorescein dye angiography may retain small amounts of fluorescein in the body for 48-72 hours post procedure  Samples containing fluorescein can produce falsely depressed TSH values  If the patient had this procedure,a specimen should be resubmitted post fluorescein clearance            The recommended reference ranges for TSH during pregnancy are as follows:  First trimester 0 1 to 2 5 uIU/mL  Second trimester  0 2 to 3 0 uIU/mL  Third trimester 0 3 to 3 0 uIU/m      Troponin I [[id number]]  (Normal) Collected:  11/28/18 0351    Lab Status:  Final result Specimen:  Blood from Arm, Left Updated:  11/28/18 0422     Troponin I <0 02 ng/mL     Comprehensive metabolic panel [[id number]]  (Abnormal) Collected:  11/28/18 0351    Lab Status:  Final result Specimen:  Blood from Arm, Left Updated:  11/28/18 0420     Sodium 146 (H) mmol/L      Potassium 3 5 mmol/L      Chloride 108 mmol/L      CO2 26 mmol/L      ANION GAP 12 mmol/L      BUN 14 mg/dL      Creatinine 0 74 mg/dL      Glucose 111 mg/dL      Calcium 9 6 mg/dL      AST 18 U/L      ALT 26 U/L      Alkaline Phosphatase 102 U/L      Total Protein 7 4 g/dL      Albumin 3 9 g/dL      Total Bilirubin 0 50 mg/dL      eGFR 82 ml/min/1 73sq m     Narrative:         National Kidney Disease Education Program recommendations are as follows:  GFR calculation is accurate only with a steady state creatinine  Chronic Kidney disease less than 60 ml/min/1 73 sq  meters  Kidney failure less than 15 ml/min/1 73 sq  meters  CBC and differential [[id number]]  (Abnormal) Collected:  11/28/18 0351    Lab Status:  Final result Specimen:  Blood from Arm, Left Updated:  11/28/18 0403     WBC 4 75 Thousand/uL      RBC 4 78 Million/uL      Hemoglobin 14 9 g/dL      Hematocrit 45 2 %      MCV 95 fL      MCH 31 2 pg      MCHC 33 0 g/dL      RDW 11 7 %      MPV 10 2 fL      Platelets 284 Thousands/uL      nRBC 0 /100 WBCs      Neutrophils Relative 42 (L) %      Immat GRANS % 0 %      Lymphocytes Relative 45 (H) %      Monocytes Relative 10 %      Eosinophils Relative 2 %      Basophils Relative 1 %      Neutrophils Absolute 1 97 Thousands/µL      Immature Grans Absolute 0 01 Thousand/uL      Lymphocytes Absolute 2 19 Thousands/µL      Monocytes Absolute 0 45 Thousand/µL      Eosinophils Absolute 0 09 Thousand/µL      Basophils Absolute 0 04 Thousands/µL                  X-ray chest 1 view portable   Final Result by Acosta Rivera MD (11/28 2047)      No acute cardiopulmonary disease              Workstation performed: [id number]                    Procedures  Procedures       Phone Contacts  ED Phone Contact    ED Course                               MDM  Number of Diagnoses or Management Options  Acute hypotension:   Bradycardia with 31-40 beats per minute:   Diagnosis management comments: Concern for bradycardia/hypotension  ? Vasovagal episode  Pt still feels weak  Will obs to hosp       Amount and/or Complexity of Data Reviewed  Clinical lab tests: ordered and reviewed  Tests in the radiology section of CPT®: ordered and reviewed    Risk of Complications, Morbidity, and/or Mortality  Presenting problems: moderate  Diagnostic procedures: moderate  Management options: moderate    Patient Progress  Patient progress: stable    CritCare Time    Disposition  Final diagnoses:   Bradycardia with 31-40 beats per minute   Acute hypotension     Time reflects when diagnosis was documented in both MDM as applicable and the Disposition within this note     Time User Action Codes Description Comment    11/28/2018  6:19 AM Isabela Cherry Add [R00 1] Bradycardia with 31-40 beats per minute     11/28/2018  6:19 AM Mary Drake Add [I95 9] Acute hypotension       ED Disposition     ED Disposition Condition Comment    Admit  Case was discussed with Estela Campbell NP and the patient's admission status was agreed to be Admission Status: observation status to the service of Dr Rudy Guallpa           Follow-up Information    None         Current Discharge Medication List      CONTINUE these medications which have NOT CHANGED    Details   ALPRAZolam (XANAX) 0 25 mg tablet Take by mouth daily at bedtime as needed for anxiety      bimatoprost (LUMIGAN) 0 01 % ophthalmic drops Apply 1 drop to eye Daily      Biotin 1 MG CAPS Take 1 capsule by mouth daily      flecainide (TAMBOCOR) 50 mg tablet Take by mouth        levothyroxine 75 mcg tablet Take 1 tablet (75 mcg total) by mouth daily  Qty: 30 tablet, Refills: 0    Associated Diagnoses: Hypothyroidism, unspecified type      LORazepam (ATIVAN) 0 5 mg tablet Take 1 tablet (0 5 mg total) by mouth daily at bedtime  Qty: 30 tablet, Refills: 0    Associated Diagnoses: Primary insomnia           No discharge procedures on file      ED Provider  Electronically Signed by           Noris Devries DO  11/28/18 9669

## 2018-11-29 ENCOUNTER — APPOINTMENT (OUTPATIENT)
Dept: NON INVASIVE DIAGNOSTICS | Facility: HOSPITAL | Age: 68
End: 2018-11-29
Attending: INTERNAL MEDICINE
Payer: MEDICARE

## 2018-11-29 VITALS
HEIGHT: 63 IN | RESPIRATION RATE: 18 BRPM | BODY MASS INDEX: 26.05 KG/M2 | WEIGHT: 147 LBS | HEART RATE: 64 BPM | DIASTOLIC BLOOD PRESSURE: 89 MMHG | OXYGEN SATURATION: 97 % | TEMPERATURE: 98.1 F | SYSTOLIC BLOOD PRESSURE: 119 MMHG

## 2018-11-29 LAB
ANION GAP SERPL CALCULATED.3IONS-SCNC: 9 MMOL/L (ref 4–13)
ATRIAL RATE: 82 BPM
BUN SERPL-MCNC: 15 MG/DL (ref 5–25)
CALCIUM SERPL-MCNC: 9.2 MG/DL (ref 8.3–10.1)
CHEST PAIN STATEMENT: NORMAL
CHLORIDE SERPL-SCNC: 108 MMOL/L (ref 100–108)
CO2 SERPL-SCNC: 27 MMOL/L (ref 21–32)
CREAT SERPL-MCNC: 0.69 MG/DL (ref 0.6–1.3)
GFR SERPL CREATININE-BSD FRML MDRD: 88 ML/MIN/1.73SQ M
GLUCOSE SERPL-MCNC: 87 MG/DL (ref 65–140)
MAGNESIUM SERPL-MCNC: 2 MG/DL (ref 1.6–2.6)
MAX DIASTOLIC BP: 82 MMHG
MAX HEART RATE: 131 BPM
MAX PREDICTED HEART RATE: 150 BPM
MAX. SYSTOLIC BP: 182 MMHG
MRSA NOSE QL CULT: NORMAL
P AXIS: 77 DEGREES
POTASSIUM SERPL-SCNC: 4 MMOL/L (ref 3.5–5.3)
PR INTERVAL: 174 MS
PROTOCOL NAME: NORMAL
QRS AXIS: 10 DEGREES
QRSD INTERVAL: 84 MS
QT INTERVAL: 372 MS
QTC INTERVAL: 434 MS
SODIUM SERPL-SCNC: 144 MMOL/L (ref 136–145)
T WAVE AXIS: 47 DEGREES
TARGET HR FORMULA: NORMAL
TEST INDICATION: NORMAL
TIME IN EXERCISE PHASE: NORMAL
TROPONIN I SERPL-MCNC: <0.02 NG/ML
VENTRICULAR RATE: 82 BPM

## 2018-11-29 PROCEDURE — 84484 ASSAY OF TROPONIN QUANT: CPT | Performed by: NURSE PRACTITIONER

## 2018-11-29 PROCEDURE — 80048 BASIC METABOLIC PNL TOTAL CA: CPT | Performed by: NURSE PRACTITIONER

## 2018-11-29 PROCEDURE — 93010 ELECTROCARDIOGRAM REPORT: CPT | Performed by: INTERNAL MEDICINE

## 2018-11-29 PROCEDURE — 83735 ASSAY OF MAGNESIUM: CPT | Performed by: NURSE PRACTITIONER

## 2018-11-29 PROCEDURE — 99215 OFFICE O/P EST HI 40 MIN: CPT | Performed by: INTERNAL MEDICINE

## 2018-11-29 PROCEDURE — 99217 PR OBSERVATION CARE DISCHARGE MANAGEMENT: CPT | Performed by: NURSE PRACTITIONER

## 2018-11-29 PROCEDURE — 93017 CV STRESS TEST TRACING ONLY: CPT

## 2018-11-29 RX ADMIN — FLECAINIDE ACETATE 25 MG: 50 TABLET ORAL at 09:21

## 2018-11-29 RX ADMIN — LEVOTHYROXINE SODIUM 75 MCG: 75 TABLET ORAL at 05:28

## 2018-11-29 NOTE — ASSESSMENT & PLAN NOTE
Patient reports history of SVT in the past  Currently takes Flecainide 25 mg Q12 hours  Around midnight 11/28 patient felt like her heart rate was racing  Took additional Flecainide at that time  Heart rate continued to be rapid and patient presented to the emergency department around 3:30 in the morning  At that time the patient's heart rate had normalized back into 70s and 80's  EKG SR rate of 82,  with nonspecific abnormality  Three sets of troponin were negative  · Monitored on telemetry with no ectopy, heart rate recorded as low as 48 while sleeping, patient asymptomatic  · Her K is 3 5 and she was given 40 mg of Kdur  · Cardiology consulted  Nuclear stress test done and result discussed to patient by Dr Joni Juarez  · Old records from patient's cardiologist in [place] reviewed by Dr Joni Juarez  · Patient will follow up with her Cardiology in [place] and she already has an appointment to see a cardio electrophysiologist   Patient advised to see them without fail  · Patient also advised to take extra 50mg of Flecainide only in the event of palpitations  · Patient also advised to avoid caffeine, chocolates that will give her palpitations

## 2018-11-29 NOTE — NURSING NOTE
Pt left via walking with steady gait accompanied by family member  Discharge instructions provided  IV d/c and intact  Non-smoker  Up to date with immunizations  No further questions at this time

## 2018-11-29 NOTE — PROGRESS NOTES
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 on flecainide 25 mg twice a day  The patient has significant drug intolerance  Reports having insomnia with increased dose of flecainide  She also reports having bradycardia with beta-blockers and AV desiree blockers  She is currently pending travel to [place] and does not want to make acute medication changes  She has no evidence of atrial fibrillation on her 24 hour monitor  Unable to induce any ventricular arrhythmias and negative exercise treadmill stress test   She will continue her home flecainide 25 mg twice a day and take an additional 50-75 mg if with recurrence of her atrial tachycardia  She will follow-up with an electrophysiologist in [place]  40 minutes spent at bedside discussing plan with patient    Subjective/Objective   No significant arrhythmias overnight on event monitor  Denies having recurrence palpitations  Objective:     Vitals: /89 (BP Location: Right arm)   Pulse 64   Temp 98 1 °F (36 7 °C) (Oral)   Resp 18   Ht 5' 3" (1 6 m)   Wt 66 7 kg (147 lb)   LMP 01/20/2000   SpO2 97%   BMI 26 04 kg/m²   Vitals:    11/28/18 0338   Weight: 66 7 kg (147 lb)     Orthostatic Blood Pressures      Most Recent Value   Blood Pressure  119/89 filed at 11/29/2018 1423   Patient Position - Orthostatic VS  Sitting filed at 11/29/2018 1423          No intake or output data in the 24 hours ending 11/29/18 1514    Invasive Devices     Peripheral Intravenous Line            Peripheral IV 11/28/18 Left Antecubital 1 day                Review of Systems: reviewed    Physical Exam   Constitutional: She is oriented to person, place, and time  No distress  HENT:   Head: Normocephalic and atraumatic  Right Ear: External ear normal    Left Ear: External ear normal    Eyes: Pupils are equal, round, and reactive to light  Conjunctivae are normal  Right eye exhibits no discharge  Left eye exhibits no discharge  No scleral icterus  Neck: Normal range of motion  Neck supple  No JVD present  No tracheal deviation present  No thyromegaly present  Cardiovascular: Normal rate and regular rhythm  Exam reveals no gallop and no friction rub  No murmur heard  Pulmonary/Chest: Effort normal and breath sounds normal  No stridor  No respiratory distress  She has no wheezes  She has no rales  She exhibits no tenderness  Abdominal: Soft  Bowel sounds are normal  She exhibits no distension and no mass  There is no tenderness  There is no rebound and no guarding  Musculoskeletal: Normal range of motion  She exhibits no edema, tenderness or deformity  Neurological: She is alert and oriented to person, place, and time  She has normal reflexes  No cranial nerve deficit  She exhibits normal muscle tone  Coordination normal    Skin: Skin is warm and dry  No rash noted  She is not diaphoretic  No erythema  No pallor  Psychiatric: She has a normal mood and affect  Her behavior is normal  Judgment and thought content normal    Nursing note and vitals reviewed  Lab Results: I have personally reviewed pertinent lab results  Imaging: I have personally reviewed pertinent reports  EKG: reviewed  VTE Pharmacologic Prophylaxis: Sequential compression device (Venodyne)   VTE Mechanical Prophylaxis: sequential compression device    Counseling / Coordination of Care  Total time spent today 45 minutes  Greater than 50% of total time was spent with the patient and / or family counseling and / or coordination of care   A description of the counseling / coordination of care: atrial tachycardia

## 2018-11-29 NOTE — UTILIZATION REVIEW
Initial Clinical Review    Admission: Date/Time/Statement: 11/28/18 0621    Orders Placed This Encounter   Procedures    Place in Observation (expected length of stay for this patient is less than two midnights)     Standing Status:   Standing     Number of Occurrences:   1     Order Specific Question:   Admitting Physician     Answer:   Kannan Braxton [[id number]]     Order Specific Question:   Level of Care     Answer:   Med Surg [16]       ED: Date/Time/Mode of Arrival:   ED Arrival Information     Expected Arrival Acuity Means of Arrival Escorted By Service Admission Type    - 11/28/2018 03:35 Urgent Walk-In Self Hospitalist Urgent    Arrival Complaint    palpitations          Chief Complaint:   Chief Complaint   Patient presents with    Palpitations     Pt complaining of palpitations since 12 this afternoon       History of Illness: Pita Jimenez is a 77 y o  female past medical history of hypothyroidism, SVT with EPS and ablation who presents with reported rapid heart rate around midnight 11/28  Patient said that this occasionally occurs and she was instructed to take an extra flecainide which the patient did  The patient reported that the heart rate still remained elevated, and at around 3:30 a m  the patient presented to the emergency department  At that time the patient's heart rate was noted to be in 70s and 80s, sinus rhythm  The patient had an episode of bradycardia in 30s during insertion of peripheral IV, most likely vasovagal   The patient had a brief interval of loss of consciousness during the procedure, and about 1 hr after episode had experienced hypotension of 71/48  The patient was given 1 L normal saline, and her blood pressure improved  The patient was admitted as an observation to telemetry  Cardiology consulted    Orthostatic blood pressures ordered, shows no signs of orthostatic hypotension      Review of Systems:    ED Vital Signs:   ED Triage Vitals   Temperature Pulse Respirations Blood Pressure SpO2   11/28/18 0338 11/28/18 0338 11/28/18 0338 11/28/18 0338 11/28/18 0338   (!) 96 2 °F (35 7 °C) 79 22 114/70 96 %      Temp Source Heart Rate Source Patient Position - Orthostatic VS BP Location FiO2 (%)   11/28/18 0338 11/28/18 0338 11/28/18 0338 11/28/18 0338 --   Tympanic Monitor Lying Right arm       Pain Score       11/28/18 0421       No Pain        Wt Readings from Last 1 Encounters:   11/28/18 66 7 kg (147 lb)       Vital Signs (abnormal): Abnormal Labs/Diagnostic Test Results:  TSH 0 249  CXR NAD   ED Treatment:   Medication Administration from 11/28/2018 0335 to 11/28/2018 0725       Date/Time Order Dose Route Action Action by Comments     11/28/2018 0416 ondansetron (ZOFRAN) injection 4 mg 4 mg Intravenous Given Jessica Ortega RN      11/28/2018 0548 sodium chloride 0 9 % bolus 1,000 mL 0 mL Intravenous Stopped Annie Nassar RN      11/28/2018 0420 sodium chloride 0 9 % bolus 1,000 mL 1,000 mL Intravenous New Bag Jessica Ortega RN           Past Medical/Surgical History: Active Ambulatory Problems     Diagnosis Date Noted    Hypothyroidism 09/15/2017    Greater trochanteric bursitis of left hip 09/15/2017    SVT (supraventricular tachycardia) (HCC) 06/12/2018    Primary insomnia 09/20/2018     Resolved Ambulatory Problems     Diagnosis Date Noted    No Resolved Ambulatory Problems     Past Medical History:   Diagnosis Date    Disease of thyroid gland     SVT (supraventricular tachycardia) (HCC)        Admitting Diagnosis: Palpitations [R00 2]  Bradycardia with 31-40 beats per minute [R00 1]  Acute hypotension [I95 9]    Age/Sex: 77 y o  female    Assessment/Plan:   Bradycardia with 31-40 beats per minute   Assessment & Plan     Bradycardia noted during insertion of peripheral IV in the emergency department, most likely vasovagal response  Heart rate had dropped down into the 30s    Patient had brief loss of consciousness at that time   Heart rate return to normal sinus rhythm 60s to 80s  Approximately 1 hr after event patient was noted to be hypotensive with blood pressure of 71/48  Patient was given 1 L normal saline  Blood pressure increased 112/75  Orthostatic blood pressures ordered, no evidence of orthostatic hypotension  Telemetry ordered  Cardiology consulted  Old records from cardiologist of [place] requested       SVT (supraventricular tachycardia) [facility])   Assessment & Plan     Patient reports history of SVT in the past   Currently takes flecainide 25 mg p o  Q 12 hours  Around midnight 11/28 patient felt like her heart rate was racing  Took additional flecainide at that time  Heart rate continued to be rapid and patient presented to the emergency department around 3:30 in the morning  At that time the patient's heart rate had normalized back into 70s and 80s, and had bradycardic episode most likely vasovagal with insertion of peripheral IV  Telemetry ordered  Cardiology consulted  Old records from patient's cardiologist in [place] requested         Hypothyroidism   Assessment & Plan     Patient has been taking Synthroid 75 mcg p o  Daily  TSH is 0 249  Will continue Synthroid 75 mcg p o  Daily  Patient will follow up with her PCP in [place] upon discharge       Primary insomnia   Assessment & Plan     Patient reports insomnia secondary to usage of flecainide  Takes alprazolam 0 25 mg p o  as needed at bedtime for sleep  Will continue        VTE Prophylaxis: Enoxaparin (Lovenox)  / sequential compression device   Code Status:  Full code discussed with patient  POLST: POLST form is on file already (pre-hospital)  Discussion with family:  I have answered all questions to the best of my abilities  Anticipated Length of Stay:  Patient will be admitted on an Observation basis with an anticipated length of stay of  less than 2 midnights     Justification for Hospital Stay:  Cardiac monitoring due to episode bradycardia, and also patient had tachycardia prior to admission      CARDIOLOGY CONSULT  Assessment:  1  Recurrent SVT despite use of flecainide  2  Bradycardia most likely secondary vasovagal due to IV stick  3  Insomnia which she feels is related to her flecainide usage  4  Hypothyroidism     Plan:  Patient has been admitted to the hospitalist service  1  Will continue her current dose of flecainide until patient makes decision regarding any medication changes  As noted below if patient would convert to pill in the pocket therapy she would require 1st dose in a monitored setting  2  Continue monitor telemetry for any recurrence of SVT  3  Attempt to limit caffeine or other stimulants, which patient states she does  4  Patient lives 6 months of the year in [place] and at this time is planning return within the next day or 2  Would probably be more judicial, if she does not start any new therapy until she stabbed issues care with new cardiologist   But we will give her any information she requires      Admission Orders:  OBS  TELE MON  ORTHOSTATIC BP  STRESS TEST AM  Scheduled Meds:   Current Facility-Administered Medications:  acetaminophen 650 mg Oral Q6H PRN PANCHO Hernandez   ALPRAZolam 0 25 mg Oral HS PRN PANCHO Hernandez   bimatoprost 1 drop Both Eyes HS PANCHO Hernandez   enoxaparin 40 mg Subcutaneous Daily PANCHO Hernandez   flecainide 25 mg Oral Q12H [facility] PANCHO Hernandez   levothyroxine 75 mcg Oral Early Morning PANCHO Hernandez   ondansetron 4 mg Intravenous Q6H PRN PANCHO Hernandez   potassium chloride 40 mEq Oral Once PANCHO Huerta     Continuous Infusions:    PRN Meds:   acetaminophen    ALPRAZolam    ondansetron

## 2018-11-29 NOTE — DISCHARGE SUMMARY
Discharge- Shanika Clement 1950, 77 y o  female MRN: [id number]    Unit/Bed#: [address] [address] A Encounter: [id number]    Primary Care Provider: PANCHO De Souza   Date and time admitted to hospital: 11/28/2018  3:37 AM        * Bradycardia with 31-40 beats per minute   Assessment & Plan    Bradycardia noted during insertion of peripheral IV in the emergency department, most likely vasovagal response  Heart rate had dropped down into the 30s  Patient had brief loss of consciousness at that time  Heart rate return to normal sinus rhythm 60s to 80s  Approximately 1 hr after event patient was noted to be hypotensive with blood pressure of 71/48  Patient was given 1 L normal saline  Blood pressure increased 112/75  · Orthostatic blood pressures ordered with evidence of orthostatic hypotension  · Monitored on telemetry with no ectopy, heart rate recorded as low as 48 while sleeping, patient asymptomatic  · Cardiology consulted  Nuclear stress test done and result discussed to patient by Dr Tracey Delaney  · Old records from patient's cardiologist in [place] reviewed by Dr Tracey Delaney  · Patient will follow up with her Cardiology in [place] and she already has an appointment to see a cardio electrophysiologist   Patient advised to see them without fail  SVT (supraventricular tachycardia) [facility])   Assessment & Plan    Patient reports history of SVT in the past  Currently takes Flecainide 25 mg Q12 hours  Around midnight 11/28 patient felt like her heart rate was racing  Took additional Flecainide at that time  Heart rate continued to be rapid and patient presented to the emergency department around 3:30 in the morning  At that time the patient's heart rate had normalized back into 70s and 80's  EKG SR rate of 82,  with nonspecific abnormality  Three sets of troponin were negative    · Monitored on telemetry with no ectopy, heart rate recorded as low as 48 while sleeping, patient asymptomatic  · Her K is 3 5 and she was given 40 mg of Kdur  · Cardiology consulted  Nuclear stress test done and result discussed to patient by Dr John Bradley  · Old records from patient's cardiologist in [place] reviewed by Dr John Bradley  · Patient will follow up with her Cardiology in [place] and she already has an appointment to see a cardio electrophysiologist   Patient advised to see them without fail  · Patient also advised to take extra 50mg of Flecainide only in the event of palpitations  · Patient also advised to avoid caffeine, chocolates that will give her palpitations  Primary insomnia   Assessment & Plan    Patient reports insomnia secondary to usage of Flecainide  Takes Alprazolam 0 25 mg p o  as needed at bedtime for sleep  · Continue as previously directed  Hypothyroidism   Assessment & Plan    Patient on Synthroid 75 mcg daily  TSH is 0 249  Patient has a lot of sensitivities with medications and reluctant to do any changes  · Will continue Synthroid 75 mcg daily  · Patient will follow up with her PCP in [place] upon discharge  Discharging Physician / Practitioner: Dev De León  PCP: Vikki Gomez  Admission Date: 11/28/2018  Discharge Date: 11/29/18    Reason for Admission: Palpitations (Pt complaining of palpitations since 12 this afternoon)        Resolved Problems  Date Reviewed: 11/29/2018    None          Consultations During Hospital Stay:  IP CONSULT TO CARDIOLOGY  IP CONSULT TO CARDIOLOGY    Procedures Performed:     · Stress test    Significant Findings / Test Results:     · See below and stress test result      Results from last 7 days  Lab Units 11/28/18  0351   WBC Thousand/uL 4 75   HEMOGLOBIN g/dL 14 9   PLATELETS Thousands/uL 249       Results from last 7 days  Lab Units 11/29/18  0519 11/28/18  0351   SODIUM mmol/L 144 146*   POTASSIUM mmol/L 4 0 3 5   CHLORIDE mmol/L 108 108   CO2 mmol/L 27 26   BUN mg/dL 15 14   CREATININE mg/dL 0 69 0  74   CALCIUM mg/dL 9 2 9 6   TOTAL BILIRUBIN mg/dL  --  0 50   ALK PHOS U/L  --  102   ALT U/L  --  26   AST U/L  --  18           Results from last 7 days  Lab Units 11/29/18  1138 11/28/18  0351   TROPONIN I ng/mL <0 02 <0 02     No results found for: HGBA1C          Blood Culture: No results found for: BLOODCX  Urine Culture: No results found for: URINECX  Sputum Culture: No components found for: SPUTUMCX  Wound Culture: No results found for: WOUNDCULT     X-ray chest 1 view portable   Final Result by Adam Miller MD (11/28 0602)      No acute cardiopulmonary disease  Workstation performed: [id number]                Incidental Findings:   · none    Test Results Pending at Discharge (will require follow up): · None     Outpatient Tests Requested:  · None    Complications:  None    Reason for Admission:   Chief Complaint   Patient presents with    Palpitations     Pt complaining of palpitations since 12 this afternoon       Hospital Course:     Lucian Calloway is a 77 y o  female patient with a PMH of hypothyroidism, SVT with EPS and ablation  who originally presented to the hospital on 11/28/2018 due to palpitations  Patient reported palpitations around 12 midnight of 11/28 which she was instructed to take extra flecainide in this event  Around 3:30 a m  Patient presented to the emergency department with a heart rate of 70-80 and sinus rhythm on the monitor while having  IV inserted patient had a bradycardia episode where her heart rate was on the 30's which is most likely vasovagal   However, there was a brief LOC and hypotension  She was given a L of normal saline with improvement in the blood pressure and mentation  She was monitored on telemetry overnight  No reported events, however heart rate down to 48 while patient was sleeping but asymptomatic  She had 3 sets of negative troponin  Cardiology was consulted and stress test were done    Record from her cardiologist were received and reviewed  Dr Cami Chou spoke to pt and had a long discussion with her on the importance of seeing her cardiologist      No bradycardia episode neither SVT on this admission  She has been chest pain free  She has been seen ambulating in the unit  Patient has a lot of sensitivities with medications and no changes were made  She was instructed to take extra Flecainide in the event of palpitations  Patient is driving to [place] tomorrow and will see her Cardiologist and electrophysiologist  Patient stated that she already has appointments to see them for next week  Please see above list of diagnoses and related plan for additional information  Condition at Discharge: stable       Discharge Day Visit / Exam:     Subjective:  Patient was seen this morning awaiting for stress test to be done  No overnight events last night except telemetry noted to be on the 48 but patient asymptomatic  She denies to chest pain, palpitations, shortness of breath  Vitals: Blood Pressure: 119/89 (11/29/18 1423)  Pulse: 64 (11/29/18 1423)  Temperature: 98 1 °F (36 7 °C) (11/29/18 1423)  Temp Source: Oral (11/29/18 1423)  Respirations: 18 (11/29/18 1423)  Height: 5' 3" (160 cm) (11/28/18 0338)  Weight - Scale: 66 7 kg (147 lb) (11/28/18 0338)  SpO2: 97 % (11/29/18 1423)     Exam:     Physical Exam    Discharge instructions/Information to patient and family:   See after visit summary for information provided to patient and family  Provisions for Follow-Up Care:  See after visit summary for information related to follow-up care and any pertinent home health orders  Disposition:     Home    Planned Readmission: None     Discharge Statement:  I spent 30 minutes discharging the patient  This time was spent on the day of discharge  I had direct contact with the patient on the day of discharge   Greater than 50% of the total time was spent examining patient, answering all patient questions, arranging and discussing plan of care with patient as well as directly providing post-discharge instructions  Additional time then spent on discharge activities  Discharge Medications:  See after visit summary for reconciled discharge medications provided to patient and family        ** Please Note: This note has been constructed using a voice recognition system **

## 2018-11-29 NOTE — DISCHARGE INSTRUCTIONS
Please when you get to [place] to call your cardiologist as we previously discussed  We did not do any changes with your medications  Take your medication as previously directed  Continue taking Flecainide as previously directed and if you feel like your heart is racing you may take extra Flecainide 50 mg as soon as possible  Atrial Tachycardia   WHAT YOU NEED TO KNOW:   Atrial tachycardia is a condition that causes your heart to beat more than 100 times each minute  Atrial tachycardia is also called supraventricular tachycardia  It can develop because of problems with your heart's electrical system  Any of the following may also put you at risk for atrial tachycardia:  · A heart condition, hypertension, or fatigue    · Anxiety, stress, or pain    · Large amounts of caffeine from coffee, tea, and energy drinks    · Heavy alcohol use or cigarette smoking    · Use of some medicines or street drugs  DISCHARGE INSTRUCTIONS:   Call 911 or have someone call if:  · You have any of the following signs of a heart attack:     ¨ Squeezing, pressure, or pain in your chest that lasts longer than a few minutes  Chest pain may come and go  ¨ Pain in your jaw, neck, one or both arms, upper and lower back, or stomach  ¨ Shortness of breath, or panting    ¨ Nausea or vomiting    ¨ Lightheadedness    · You cannot be woken  Contact your healthcare provider if:  · Your pulse is faster than your healthcare provider said it should be  · You have frequent periods of a fast heart rate  · You feel weak or dizzy  · You have questions or concerns about your condition or care  Medicines: You may  be given any of the following:  · Antiarrhythmia medicines  help keep your heartbeat in a regular rhythm  · Take your medicine as directed  Contact your healthcare provider if you think your medicine is not helping or if you have side effects  Tell him or her if you are allergic to any medicine   Keep a list of the medicines, vitamins, and herbs you take  Include the amounts, and when and why you take them  Bring the list or the pill bottles to follow-up visits  Carry your medicine list with you in case of an emergency  Follow up with your healthcare provider or cardiologist as directed: You may need more tests  Write down your questions so you remember to ask them during your visits  Prevention and Management:   · Decrease the amount of caffeine you drink  Caffeine can increase your heart rate  · Limit or do not drink alcohol  Alcohol can increase your heart rate  Ask your healthcare provider if it is safe for you to drink alcohol  Also ask how much is safe for you to drink  · Do not smoke  Nicotine and other chemicals in cigarettes can cause damage to your heart  Ask your healthcare provider for information if you currently smoke and need help to quit  E-cigarettes or smokeless tobacco still contain nicotine  Talk to your healthcare provider before you use these products  · Do not use illegal drugs  Drugs such as meth and cocaine can increase your heart rate  Talk to your healthcare provider if you use illegal drugs and need help to quit  · Get more rest   Fatigue can cause your heart rate to increase  · Learn ways to cope with stress  Stress, fear, and anxiety can cause a fast heart rate  Your healthcare provider may recommend relaxation techniques and deep breathing exercises  Your healthcare provider may recommend you talk to someone about your stress or anxiety, such as a counselor or a trusted friend  Check your pulse as directed: Your healthcare provider will show you how to check your pulse, and how often to check it  Write down how fast your pulse is and if it feels regular or like it is skipping beats  Also write down the activity you were doing if your heart rate is above 100  Bring the information with you to your follow-up appointment          © 2017 [employer] Information is for End User's use only and may not be sold, redistributed or otherwise used for commercial purposes  All illustrations and images included in CareNotes® are the copyrighted property of A [employer] M [employer] Inc  or [employer] Keith  The above information is an  only  It is not intended as medical advice for individual conditions or treatments  Talk to your doctor, nurse or pharmacist before following any medical regimen to see if it is safe and effective for you

## 2018-11-29 NOTE — ASSESSMENT & PLAN NOTE
Patient on Synthroid 75 mcg daily  TSH is 0 249  Patient has a lot of sensitivities with medications and reluctant to do any changes  · Will continue Synthroid 75 mcg daily  · Patient will follow up with her PCP in [place] upon discharge

## 2018-11-29 NOTE — ASSESSMENT & PLAN NOTE
Patient reports insomnia secondary to usage of Flecainide  Takes Alprazolam 0 25 mg p o  as needed at bedtime for sleep  · Continue as previously directed

## 2018-11-30 ENCOUNTER — TRANSITIONAL CARE MANAGEMENT (OUTPATIENT)
Dept: FAMILY MEDICINE CLINIC | Facility: CLINIC | Age: 68
End: 2018-11-30

## 2018-11-30 PROCEDURE — 93016 CV STRESS TEST SUPVJ ONLY: CPT | Performed by: INTERNAL MEDICINE

## 2018-11-30 PROCEDURE — 93018 CV STRESS TEST I&R ONLY: CPT | Performed by: INTERNAL MEDICINE

## 2018-12-13 ENCOUNTER — TELEPHONE (OUTPATIENT)
Dept: ADMINISTRATIVE | Facility: OTHER | Age: 68
End: 2018-12-13

## 2018-12-13 NOTE — TELEPHONE ENCOUNTER
1/18/19 Received and scanned in to chart  1/17/19 I called pt who states she provided a signed [facility] [address] form to Dr Edison Gold  I am sending records request to Dr Edison Gold again today for mammo/colon /cws    I spoke with patient inquiring if she had signed the Release of Health Information form that I had mailed her on November 8th  Pt requested that I re-send the form to her  She states she is in [place] now and will make sure that the signed form is given to Dr Janis Dickens office so that we can obtain her most recent mammogram and colonoscopy  I have mailed another blank form to patient today for her to sign

## 2019-05-28 ENCOUNTER — TELEPHONE (OUTPATIENT)
Dept: FAMILY MEDICINE CLINIC | Facility: CLINIC | Age: 69
End: 2019-05-28

## 2019-05-28 ENCOUNTER — OFFICE VISIT (OUTPATIENT)
Dept: FAMILY MEDICINE CLINIC | Facility: CLINIC | Age: 69
End: 2019-05-28
Payer: COMMERCIAL

## 2019-05-28 VITALS
RESPIRATION RATE: 18 BRPM | BODY MASS INDEX: 26.4 KG/M2 | DIASTOLIC BLOOD PRESSURE: 60 MMHG | HEART RATE: 62 BPM | SYSTOLIC BLOOD PRESSURE: 106 MMHG | HEIGHT: 63 IN | TEMPERATURE: 97.8 F | WEIGHT: 149 LBS

## 2019-05-28 DIAGNOSIS — E03.9 ACQUIRED HYPOTHYROIDISM: ICD-10-CM

## 2019-05-28 DIAGNOSIS — D48.9 NEOPLASM OF UNCERTAIN BEHAVIOR: ICD-10-CM

## 2019-05-28 DIAGNOSIS — I47.1 SUPRAVENTRICULAR TACHYCARDIA (HCC): ICD-10-CM

## 2019-05-28 DIAGNOSIS — I47.1 SVT (SUPRAVENTRICULAR TACHYCARDIA) (HCC): ICD-10-CM

## 2019-05-28 DIAGNOSIS — E55.9 VITAMIN D DEFICIENCY: Primary | ICD-10-CM

## 2019-05-28 DIAGNOSIS — Z00.00 MEDICARE ANNUAL WELLNESS VISIT, SUBSEQUENT: ICD-10-CM

## 2019-05-28 PROBLEM — R00.1 BRADYCARDIA WITH 31-40 BEATS PER MINUTE: Status: RESOLVED | Noted: 2018-11-28 | Resolved: 2019-05-28

## 2019-05-28 PROCEDURE — G0439 PPPS, SUBSEQ VISIT: HCPCS | Performed by: NURSE PRACTITIONER

## 2019-05-28 PROCEDURE — 1125F AMNT PAIN NOTED PAIN PRSNT: CPT | Performed by: NURSE PRACTITIONER

## 2019-05-28 PROCEDURE — 1036F TOBACCO NON-USER: CPT | Performed by: NURSE PRACTITIONER

## 2019-05-28 PROCEDURE — 99214 OFFICE O/P EST MOD 30 MIN: CPT | Performed by: NURSE PRACTITIONER

## 2019-05-28 PROCEDURE — 1170F FXNL STATUS ASSESSED: CPT | Performed by: NURSE PRACTITIONER

## 2019-05-28 PROCEDURE — 3008F BODY MASS INDEX DOCD: CPT | Performed by: NURSE PRACTITIONER

## 2019-05-28 PROCEDURE — 1160F RVW MEDS BY RX/DR IN RCRD: CPT | Performed by: NURSE PRACTITIONER

## 2019-05-28 RX ORDER — BISOPROLOL FUMARATE 10 MG/1
25 TABLET ORAL DAILY
COMMUNITY
End: 2021-09-07

## 2019-07-25 ENCOUNTER — APPOINTMENT (OUTPATIENT)
Dept: LAB | Facility: CLINIC | Age: 69
End: 2019-07-25
Payer: COMMERCIAL

## 2019-07-25 DIAGNOSIS — E55.9 VITAMIN D DEFICIENCY: ICD-10-CM

## 2019-07-25 PROCEDURE — 82306 VITAMIN D 25 HYDROXY: CPT

## 2019-07-25 PROCEDURE — 36415 COLL VENOUS BLD VENIPUNCTURE: CPT

## 2019-07-26 LAB — 25(OH)D3 SERPL-MCNC: 35.4 NG/ML (ref 30–100)

## 2019-07-31 ENCOUNTER — TELEPHONE (OUTPATIENT)
Dept: FAMILY MEDICINE CLINIC | Facility: CLINIC | Age: 69
End: 2019-07-31

## 2019-07-31 NOTE — TELEPHONE ENCOUNTER
Pt left a message on result hotline for her lab work results done for her vitamin d level  I do see a letter in pts chart   Kindra Hayden

## 2019-10-25 ENCOUNTER — OFFICE VISIT (OUTPATIENT)
Dept: URGENT CARE | Facility: CLINIC | Age: 69
End: 2019-10-25
Payer: COMMERCIAL

## 2019-10-25 VITALS
HEART RATE: 54 BPM | TEMPERATURE: 97.2 F | BODY MASS INDEX: 26.19 KG/M2 | HEIGHT: 64 IN | OXYGEN SATURATION: 98 % | RESPIRATION RATE: 18 BRPM | SYSTOLIC BLOOD PRESSURE: 124 MMHG | WEIGHT: 153.4 LBS | DIASTOLIC BLOOD PRESSURE: 80 MMHG

## 2019-10-25 DIAGNOSIS — S29.012A STRAIN OF THORACIC BACK REGION: Primary | ICD-10-CM

## 2019-10-25 PROCEDURE — 99213 OFFICE O/P EST LOW 20 MIN: CPT | Performed by: PHYSICIAN ASSISTANT

## 2019-10-25 RX ORDER — IBUPROFEN 200 MG
400 TABLET ORAL EVERY 6 HOURS PRN
Qty: 24 TABLET | Refills: 0 | Status: SHIPPED | COMMUNITY
Start: 2019-10-25

## 2019-10-25 RX ORDER — CHOLECALCIFEROL (VITAMIN D3) 125 MCG
2000 CAPSULE ORAL DAILY
COMMUNITY
End: 2019-11-20 | Stop reason: SDUPTHER

## 2019-10-26 NOTE — PATIENT INSTRUCTIONS
Apply ice to your back for 20-30 minutes at a time, 3-4 times daily  You may apply heat for 20-30 minutes at a time, as needed for stiffness  Take ibuprofen or tylenol as directed  Take the ibuprofen with food  While taking the ibuprofen take Pepcid to help coat your stomach  Rest but do not fully immobilize yourself  Perform stretching exercises 2-3 times daily as reviewed in office  Follow up with your family doctor or an orthopedist in 3-5 days if symptoms persist  Proceed to the ER if symptoms worsen

## 2019-10-26 NOTE — PROGRESS NOTES
[facility] [facility] [employer] [facility]        NAME: Jerilyn Humphrey is a 68 y o  female  : 1950    MRN: [id number]  DATE: 2019  TIME: 8:29 PM    Assessment and Plan   Strain of thoracic back region [S29 012A]  1  Strain of thoracic back region  ibuprofen (MOTRIN) 200 mg tablet     Patient Instructions     Apply ice to your back for 20-30 minutes at a time, 3-4 times daily  You may apply heat for 20-30 minutes at a time, as needed for stiffness  Take ibuprofen or tylenol as directed  Take the ibuprofen with food  While taking the ibuprofen take Pepcid to help coat your stomach  Rest but do not fully immobilize yourself  Perform stretching exercises 2-3 times daily as reviewed in office  Follow up with your family doctor or an orthopedist in 3-5 days if symptoms persist  Proceed to the ER if symptoms worsen  Chief Complaint     Chief Complaint   Patient presents with    Back Pain     Intermittent back opain x several weeks but worse x 2 days  Pain upper thoracic area that radiates to both side  Worse with deep breath and certain positions  Taking Tylenol  History of Present Illness     69 y/o female presenting with c/o back pain x 2 days  Reports an intermittent pain of the upper thoracic region over the past few weeks, first beginning after painting  This increased over the past two days after lifting 61 lbs dog in and out of the car yesterday  Pain is tight and sharp, located midline and extending outward to bilateral shoulders  Pain is worse with leaning back but better with sitting upright in a supportive chair  No N/T/W, incontinence, or saddle anesthesia  Reports a back injury a few years ago pulling weeds that required PT  Has treated with acetaminophen with little change  Review of Systems   Review of Systems   Constitutional: Negative for chills, diaphoresis and fever  Respiratory: Negative for cough, shortness of breath and wheezing      Cardiovascular: Negative for chest pain and palpitations  Gastrointestinal: Negative for abdominal pain, diarrhea, nausea and vomiting  Musculoskeletal: Negative for arthralgias and myalgias  Other than as noted in HPI  Skin: Negative for color change and rash  Neurological: Negative for light-headedness and headaches  Current Medications       Current Outpatient Medications:     ALPRAZolam (XANAX) 0 25 mg tablet, Take by mouth daily at bedtime as needed for anxiety, Disp: , Rfl:     bimatoprost (LUMIGAN) 0 01 % ophthalmic drops, Apply 1 drop to eye Daily, Disp: , Rfl:     Biotin 1 MG CAPS, Take 1 capsule by mouth daily, Disp: , Rfl:     bisoprolol (ZEBETA) 10 MG tablet, Take 25 mg by mouth daily, Disp: , Rfl:     Cholecalciferol (VITAMIN D3) 2000 units TABS, Take 2,000 Units by mouth daily, Disp: , Rfl:     levothyroxine 75 mcg tablet, Take 1 tablet (75 mcg total) by mouth daily (Patient taking differently: Take 100 mcg by mouth daily  ), Disp: 30 tablet, Rfl: 0    ibuprofen (MOTRIN) 200 mg tablet, Take 2 tablets (400 mg total) by mouth every 6 (six) hours as needed for mild pain, Disp: 24 tablet, Rfl: 0    Current Allergies     Allergies as of 10/25/2019    (No Known Allergies)          The following portions of the patient's history were reviewed and updated as appropriate: allergies, current medications, past family history, past medical history, past social history, past surgical history and problem list      Past Medical History:   Diagnosis Date    Anxiety     Disease of thyroid gland     Glaucoma     bilateral    Osteoporosis     SVT (supraventricular tachycardia) (HCC)     Vitamin D deficiency        Past Surgical History:   Procedure Laterality Date    BREAST LUMPECTOMY      CARDIAC ELECTROPHYSIOLOGY STUDY AND ABLATION       SECTION       No family history on file  Medications have been verified      Objective   /80 (BP Location: Left arm, Patient Position: Sitting, Cuff Size: Standard)   Pulse (!) 54   Temp (!) 97 2 °F (36 2 °C) (Tympanic)   Resp 18   Ht 5' 4" (1 626 m)   Wt 69 6 kg (153 lb 6 4 oz)   LMP 01/20/2000   SpO2 98%   BMI 26 33 kg/m²      Physical Exam     Physical Exam   Constitutional: She is oriented to person, place, and time  Vital signs are normal  She appears well-developed and well-nourished  She is cooperative  She does not appear ill  No distress  HENT:   Head: Normocephalic and atraumatic  Eyes: Conjunctivae and lids are normal  Right eye exhibits no chemosis, no discharge and no exudate  Left eye exhibits no chemosis, no discharge and no exudate  Right conjunctiva is not injected  Left conjunctiva is not injected  Cardiovascular: Normal rate, regular rhythm and normal heart sounds  Exam reveals no gallop, no distant heart sounds and no friction rub  No murmur heard  Pulmonary/Chest: Effort normal and breath sounds normal  No stridor  No respiratory distress  She has no decreased breath sounds  She has no wheezes  She has no rhonchi  She has no rales  Abdominal: Soft  Bowel sounds are normal  She exhibits no distension and no mass  There is no tenderness  There is no rigidity, no rebound and no guarding  Musculoskeletal:   Bilateral PSM and trapezius TTP  No vertebral tenderness  Pain reproducible with movement  No palpable spasm  UE neurovascularly intact  Neurological: She is alert and oriented to person, place, and time  She has normal strength  She is not disoriented  No cranial nerve deficit  She exhibits normal muscle tone  Coordination and gait normal    Skin: Skin is warm, dry and intact  No rash noted  She is not diaphoretic  No erythema  No pallor  Psychiatric: She has a normal mood and affect  Her behavior is normal  Judgment and thought content normal    Nursing note and vitals reviewed

## 2019-10-28 ENCOUNTER — OFFICE VISIT (OUTPATIENT)
Dept: FAMILY MEDICINE CLINIC | Facility: CLINIC | Age: 69
End: 2019-10-28
Payer: COMMERCIAL

## 2019-10-28 VITALS
SYSTOLIC BLOOD PRESSURE: 114 MMHG | RESPIRATION RATE: 16 BRPM | TEMPERATURE: 96.9 F | WEIGHT: 152 LBS | DIASTOLIC BLOOD PRESSURE: 72 MMHG | BODY MASS INDEX: 25.95 KG/M2 | HEIGHT: 64 IN | HEART RATE: 72 BPM

## 2019-10-28 DIAGNOSIS — S29.012A STRAIN OF THORACIC BACK REGION: Primary | ICD-10-CM

## 2019-10-28 PROCEDURE — 3008F BODY MASS INDEX DOCD: CPT | Performed by: FAMILY MEDICINE

## 2019-10-28 PROCEDURE — 99213 OFFICE O/P EST LOW 20 MIN: CPT | Performed by: FAMILY MEDICINE

## 2019-10-28 RX ORDER — BACLOFEN 10 MG/1
10 TABLET ORAL 3 TIMES DAILY
Qty: 30 TABLET | Refills: 0 | Status: SHIPPED | OUTPATIENT
Start: 2019-10-28 | End: 2019-11-20

## 2019-10-28 NOTE — PROGRESS NOTES
Assessment/Plan:       Diagnoses and all orders for this visit:    Strain of thoracic back region  -     baclofen 10 mg tablet; Take 1 tablet (10 mg total) by mouth 3 (three) times a day  - Increase Motrin to 400mg q4-6 hours PRN  - Counseled on exercise    - Recommend PT if symptoms do not improve  Subjective:      Patient ID: Paresh Reeder is a 68 y o  female  HPI  [facility] presents today for follow up of thoracic back sprain  She was seen at urgent care on 10/25/19 for the same after pain 2/2 exertion from painting and lifting a heavy dog which began a few weeks ago, but has worsened  No bowel/bladder issues or saddle anesthesia  Tried acetaminophen at home with no relief  Has been taking Motrin 200mg with minimal relief  The following portions of the patient's history were reviewed and updated as appropriate: allergies, current medications, past family history, past medical history, past social history, past surgical history and problem list     Review of Systems   Constitutional: Negative for activity change, appetite change, chills, diaphoresis, fatigue and fever  HENT: Negative  Respiratory: Negative for cough, choking, chest tightness, shortness of breath and wheezing  Cardiovascular: Negative for chest pain, palpitations and leg swelling  Gastrointestinal: Negative for abdominal distention, abdominal pain, constipation, diarrhea, nausea and vomiting  Genitourinary: Negative for difficulty urinating, dyspareunia, dysuria, enuresis, flank pain, frequency, menstrual problem, pelvic pain and urgency  Musculoskeletal: Positive for back pain  Negative for arthralgias, gait problem, joint swelling, myalgias, neck pain and neck stiffness  Skin: Negative  Neurological: Negative for dizziness, tremors, syncope, facial asymmetry, weakness, light-headedness, numbness and headaches  Psychiatric/Behavioral: Negative            Objective:      /72   Pulse 72   Temp (!) 96 9 °F (36 1 °C)   Resp 16   Ht 5' 4" (1 626 m)   Wt 68 9 kg (152 lb)   LMP 01/20/2000   BMI 26 09 kg/m²          Physical Exam   Constitutional: She is oriented to person, place, and time  She appears well-developed and well-nourished  No distress  HENT:   Head: Normocephalic and atraumatic  Neck: Normal range of motion  Neck supple  Cardiovascular: Normal rate, regular rhythm, normal heart sounds and intact distal pulses  Exam reveals no gallop and no friction rub  No murmur heard  Pulmonary/Chest: Effort normal and breath sounds normal  No respiratory distress  She has no wheezes  She has no rales  She exhibits no tenderness  Abdominal: Soft  Bowel sounds are normal  She exhibits no distension and no mass  There is no tenderness  There is no rebound and no guarding  Musculoskeletal: Normal range of motion  She exhibits no edema, tenderness or deformity  Pain on movement of mid back   Neurological: She is alert and oriented to person, place, and time  Skin: Skin is warm and dry  She is not diaphoretic  Psychiatric: She has a normal mood and affect   Her behavior is normal  Judgment and thought content normal

## 2019-11-20 ENCOUNTER — OFFICE VISIT (OUTPATIENT)
Dept: FAMILY MEDICINE CLINIC | Facility: CLINIC | Age: 69
End: 2019-11-20
Payer: COMMERCIAL

## 2019-11-20 VITALS
DIASTOLIC BLOOD PRESSURE: 60 MMHG | HEART RATE: 55 BPM | OXYGEN SATURATION: 98 % | RESPIRATION RATE: 16 BRPM | HEIGHT: 64 IN | TEMPERATURE: 98.8 F | WEIGHT: 153.4 LBS | BODY MASS INDEX: 26.19 KG/M2 | SYSTOLIC BLOOD PRESSURE: 102 MMHG

## 2019-11-20 DIAGNOSIS — M67.442 GANGLION CYST OF FINGER OF LEFT HAND: ICD-10-CM

## 2019-11-20 DIAGNOSIS — E55.9 VITAMIN D DEFICIENCY: ICD-10-CM

## 2019-11-20 DIAGNOSIS — E03.9 ACQUIRED HYPOTHYROIDISM: ICD-10-CM

## 2019-11-20 DIAGNOSIS — L57.0 ACTINIC KERATOSES: Primary | ICD-10-CM

## 2019-11-20 DIAGNOSIS — I47.1 SVT (SUPRAVENTRICULAR TACHYCARDIA) (HCC): ICD-10-CM

## 2019-11-20 PROCEDURE — 99214 OFFICE O/P EST MOD 30 MIN: CPT | Performed by: FAMILY MEDICINE

## 2019-11-20 PROCEDURE — 1160F RVW MEDS BY RX/DR IN RCRD: CPT | Performed by: FAMILY MEDICINE

## 2019-11-20 PROCEDURE — 1036F TOBACCO NON-USER: CPT | Performed by: FAMILY MEDICINE

## 2019-11-20 RX ORDER — CHOLECALCIFEROL (VITAMIN D3) 125 MCG
2000 CAPSULE ORAL DAILY
Qty: 90 TABLET | Refills: 3 | Status: SHIPPED | OUTPATIENT
Start: 2019-11-20 | End: 2020-08-05

## 2019-11-20 NOTE — PROGRESS NOTES
Assessment/Plan:    She is leaving for [place] shortly and will f/u with a hand specialist for evaluation of ganglion as well as derm to have the lesion on her back removed  1  Actinic keratoses    2  Ganglion cyst of finger of left hand    3  Vitamin D deficiency  Assessment & Plan:  On 2000 units daily  Will recheck labs  Orders:  -     Vitamin D 25 hydroxy; Future  -     Cholecalciferol (VITAMIN D3) 50 MCG (2000 UT) TABS; Take 1 tablet (2,000 Units total) by mouth daily    4  Acquired hypothyroidism  Assessment & Plan:  Routine labs ordered  Has been stable on Levothyroxine    Orders:  -     CBC and differential; Future  -     Comprehensive metabolic panel; Future  -     LIPID PANEL + LDL/HDL RATIO; Future  -     TSH, 3rd generation; Future  -     T4, free; Future    5  SVT (supraventricular tachycardia) (HCC)  Assessment & Plan:  Her heart rate has been well controlled on Bisoprolol  Orders:  -     CBC and differential; Future  -     Comprehensive metabolic panel; Future  -     LIPID PANEL + LDL/HDL RATIO; Future          BMI Counseling: Body mass index is 26 33 kg/m²  The BMI is above normal  Nutrition recommendations include consuming healthier snacks  Exercise recommendations include moderate physical activity 150 minutes/week  Patient Instructions   Please make an appointment with your hand specialist as well as the dermatologist         Return if symptoms worsen or fail to improve  Subjective:      Patient ID: Jamison Lopez is a 68 y o  female  Chief Complaint   Patient presents with    Nevus     on middle of back  vfrma    Mass     on right hand       Here today for evaluation of lumps in her right hand  They are somewhat tender to touch  Also concerned about a lesion on her back  She was seen for this in the past, but has not yet seen dermatologist     Requesting order for routine bloodwork     Will be leaving for [place] on 12/2      The following portions of the patient's history were reviewed and updated as appropriate: allergies, current medications, past family history, past medical history, past social history, past surgical history and problem list     Review of Systems   Constitutional: Negative  Skin:        See HPI   All other systems reviewed and are negative  Current Outpatient Medications   Medication Sig Dispense Refill    bimatoprost (LUMIGAN) 0 01 % ophthalmic drops Apply 1 drop to eye Daily      Biotin 1 MG CAPS Take 1 capsule by mouth daily      bisoprolol (ZEBETA) 10 MG tablet Take 25 mg by mouth daily      Cholecalciferol (VITAMIN D3) 50 MCG (2000 UT) TABS Take 1 tablet (2,000 Units total) by mouth daily 90 tablet 3    ibuprofen (MOTRIN) 200 mg tablet Take 2 tablets (400 mg total) by mouth every 6 (six) hours as needed for mild pain 24 tablet 0    levothyroxine 75 mcg tablet Take 1 tablet (75 mcg total) by mouth daily (Patient taking differently: Take 100 mcg by mouth daily  ) 30 tablet 0    ALPRAZolam (XANAX) 0 25 mg tablet Take by mouth daily at bedtime as needed for anxiety       No current facility-administered medications for this visit  Objective:    /60   Pulse 55   Temp 98 8 °F (37 1 °C)   Resp 16   Ht 5' 4" (1 626 m)   Wt 69 6 kg (153 lb 6 4 oz)   LMP 01/20/2000 (Approximate)   SpO2 98%   BMI 26 33 kg/m²        Physical Exam   Constitutional: She appears well-developed and well-nourished  HENT:   Head: Normocephalic and atraumatic  Right Ear: Tympanic membrane, external ear and ear canal normal    Left Ear: Tympanic membrane, external ear and ear canal normal    Nose: No mucosal edema or rhinorrhea  Mouth/Throat: Uvula is midline, oropharynx is clear and moist and mucous membranes are normal    Eyes: Conjunctivae are normal    Neck: Neck supple  No edema present  No thyromegaly present  Cardiovascular: Normal rate, regular rhythm, normal heart sounds and intact distal pulses     No murmur heard   Pulmonary/Chest: Effort normal and breath sounds normal    Abdominal: Bowel sounds are normal  She exhibits no distension  There is no splenomegaly or hepatomegaly  There is no tenderness  Lymphadenopathy:        Right cervical: No superficial cervical adenopathy present  Left cervical: No superficial cervical adenopathy present  Skin: Skin is warm, dry and intact  No rash noted  Psychiatric: She has a normal mood and affect  Nursing note and vitals reviewed               Cynthia Hidalgo

## 2019-11-22 ENCOUNTER — APPOINTMENT (OUTPATIENT)
Dept: LAB | Facility: CLINIC | Age: 69
End: 2019-11-22
Payer: COMMERCIAL

## 2019-11-22 DIAGNOSIS — E55.9 VITAMIN D DEFICIENCY: ICD-10-CM

## 2019-11-22 DIAGNOSIS — I47.1 SVT (SUPRAVENTRICULAR TACHYCARDIA) (HCC): ICD-10-CM

## 2019-11-22 DIAGNOSIS — E03.9 ACQUIRED HYPOTHYROIDISM: ICD-10-CM

## 2019-11-22 LAB
25(OH)D3 SERPL-MCNC: 36.9 NG/ML (ref 30–100)
ALBUMIN SERPL BCP-MCNC: 4 G/DL (ref 3.5–5)
ALP SERPL-CCNC: 107 U/L (ref 46–116)
ALT SERPL W P-5'-P-CCNC: 21 U/L (ref 12–78)
ANION GAP SERPL CALCULATED.3IONS-SCNC: 7 MMOL/L (ref 4–13)
AST SERPL W P-5'-P-CCNC: 19 U/L (ref 5–45)
BASOPHILS # BLD AUTO: 0.04 THOUSANDS/ΜL (ref 0–0.1)
BASOPHILS NFR BLD AUTO: 1 % (ref 0–1)
BILIRUB SERPL-MCNC: 0.52 MG/DL (ref 0.2–1)
BUN SERPL-MCNC: 12 MG/DL (ref 5–25)
CALCIUM SERPL-MCNC: 9.1 MG/DL (ref 8.3–10.1)
CHLORIDE SERPL-SCNC: 111 MMOL/L (ref 100–108)
CHOLEST SERPL-MCNC: 184 MG/DL (ref 50–200)
CO2 SERPL-SCNC: 27 MMOL/L (ref 21–32)
CREAT SERPL-MCNC: 0.63 MG/DL (ref 0.6–1.3)
EOSINOPHIL # BLD AUTO: 0.2 THOUSAND/ΜL (ref 0–0.61)
EOSINOPHIL NFR BLD AUTO: 5 % (ref 0–6)
ERYTHROCYTE [DISTWIDTH] IN BLOOD BY AUTOMATED COUNT: 11.7 % (ref 11.6–15.1)
GFR SERPL CREATININE-BSD FRML MDRD: 91 ML/MIN/1.73SQ M
GLUCOSE P FAST SERPL-MCNC: 90 MG/DL (ref 65–99)
HCT VFR BLD AUTO: 39.1 % (ref 34.8–46.1)
HDLC SERPL-MCNC: 65 MG/DL
HGB BLD-MCNC: 13 G/DL (ref 11.5–15.4)
IMM GRANULOCYTES # BLD AUTO: 0 THOUSAND/UL (ref 0–0.2)
IMM GRANULOCYTES NFR BLD AUTO: 0 % (ref 0–2)
LDLC SERPL CALC-MCNC: 100 MG/DL (ref 0–100)
LYMPHOCYTES # BLD AUTO: 1.93 THOUSANDS/ΜL (ref 0.6–4.47)
LYMPHOCYTES NFR BLD AUTO: 49 % (ref 14–44)
MCH RBC QN AUTO: 31.6 PG (ref 26.8–34.3)
MCHC RBC AUTO-ENTMCNC: 33.2 G/DL (ref 31.4–37.4)
MCV RBC AUTO: 95 FL (ref 82–98)
MONOCYTES # BLD AUTO: 0.48 THOUSAND/ΜL (ref 0.17–1.22)
MONOCYTES NFR BLD AUTO: 12 % (ref 4–12)
NEUTROPHILS # BLD AUTO: 1.27 THOUSANDS/ΜL (ref 1.85–7.62)
NEUTS SEG NFR BLD AUTO: 33 % (ref 43–75)
NONHDLC SERPL-MCNC: 119 MG/DL
NRBC BLD AUTO-RTO: 0 /100 WBCS
PLATELET # BLD AUTO: 197 THOUSANDS/UL (ref 149–390)
PMV BLD AUTO: 10.6 FL (ref 8.9–12.7)
POTASSIUM SERPL-SCNC: 3.9 MMOL/L (ref 3.5–5.3)
PROT SERPL-MCNC: 6.9 G/DL (ref 6.4–8.2)
RBC # BLD AUTO: 4.12 MILLION/UL (ref 3.81–5.12)
SODIUM SERPL-SCNC: 145 MMOL/L (ref 136–145)
T4 FREE SERPL-MCNC: 1.29 NG/DL (ref 0.76–1.46)
TRIGL SERPL-MCNC: 96 MG/DL
TSH SERPL DL<=0.05 MIU/L-ACNC: 0.01 UIU/ML (ref 0.36–3.74)
WBC # BLD AUTO: 3.92 THOUSAND/UL (ref 4.31–10.16)

## 2019-11-22 PROCEDURE — 36415 COLL VENOUS BLD VENIPUNCTURE: CPT

## 2019-11-22 PROCEDURE — 80053 COMPREHEN METABOLIC PANEL: CPT

## 2019-11-22 PROCEDURE — 82306 VITAMIN D 25 HYDROXY: CPT

## 2019-11-22 PROCEDURE — 84443 ASSAY THYROID STIM HORMONE: CPT

## 2019-11-22 PROCEDURE — 84439 ASSAY OF FREE THYROXINE: CPT

## 2019-11-22 PROCEDURE — 80061 LIPID PANEL: CPT

## 2019-11-22 PROCEDURE — 85025 COMPLETE CBC W/AUTO DIFF WBC: CPT

## 2019-11-26 ENCOUNTER — TELEPHONE (OUTPATIENT)
Dept: FAMILY MEDICINE CLINIC | Facility: CLINIC | Age: 69
End: 2019-11-26

## 2019-11-26 NOTE — TELEPHONE ENCOUNTER
PT picked up results and now has questions because she doesn't understand them  Please call pt back to help her figure out what they mean

## 2019-11-26 NOTE — TELEPHONE ENCOUNTER
Reviewed results w/ pt  TSH is low, however she is feeling good on current dose  I asked her to have her levels rechecked in 2 months when she is in [facility]

## 2020-08-05 ENCOUNTER — OFFICE VISIT (OUTPATIENT)
Dept: FAMILY MEDICINE CLINIC | Facility: CLINIC | Age: 70
End: 2020-08-05
Payer: COMMERCIAL

## 2020-08-05 VITALS
HEART RATE: 72 BPM | DIASTOLIC BLOOD PRESSURE: 72 MMHG | RESPIRATION RATE: 16 BRPM | TEMPERATURE: 97 F | HEIGHT: 64 IN | SYSTOLIC BLOOD PRESSURE: 118 MMHG | WEIGHT: 157 LBS | BODY MASS INDEX: 26.8 KG/M2

## 2020-08-05 DIAGNOSIS — E55.9 VITAMIN D DEFICIENCY: ICD-10-CM

## 2020-08-05 DIAGNOSIS — I47.1 SVT (SUPRAVENTRICULAR TACHYCARDIA) (HCC): ICD-10-CM

## 2020-08-05 DIAGNOSIS — E03.9 ACQUIRED HYPOTHYROIDISM: ICD-10-CM

## 2020-08-05 DIAGNOSIS — Z11.59 NEED FOR HEPATITIS C SCREENING TEST: ICD-10-CM

## 2020-08-05 DIAGNOSIS — Z00.00 MEDICARE ANNUAL WELLNESS VISIT, SUBSEQUENT: Primary | ICD-10-CM

## 2020-08-05 DIAGNOSIS — Z12.11 SCREENING FOR COLON CANCER: ICD-10-CM

## 2020-08-05 PROCEDURE — 1036F TOBACCO NON-USER: CPT | Performed by: NURSE PRACTITIONER

## 2020-08-05 PROCEDURE — 1170F FXNL STATUS ASSESSED: CPT | Performed by: NURSE PRACTITIONER

## 2020-08-05 PROCEDURE — 1160F RVW MEDS BY RX/DR IN RCRD: CPT | Performed by: NURSE PRACTITIONER

## 2020-08-05 PROCEDURE — 3288F FALL RISK ASSESSMENT DOCD: CPT | Performed by: NURSE PRACTITIONER

## 2020-08-05 PROCEDURE — 3008F BODY MASS INDEX DOCD: CPT | Performed by: NURSE PRACTITIONER

## 2020-08-05 PROCEDURE — 1125F AMNT PAIN NOTED PAIN PRSNT: CPT | Performed by: NURSE PRACTITIONER

## 2020-08-05 PROCEDURE — 1101F PT FALLS ASSESS-DOCD LE1/YR: CPT | Performed by: NURSE PRACTITIONER

## 2020-08-05 PROCEDURE — G0439 PPPS, SUBSEQ VISIT: HCPCS | Performed by: NURSE PRACTITIONER

## 2020-08-05 RX ORDER — CHOLECALCIFEROL (VITAMIN D3) 125 MCG
4000 CAPSULE ORAL DAILY
Qty: 90 TABLET | Refills: 3 | Status: SHIPPED | OUTPATIENT
Start: 2020-08-05

## 2020-08-05 NOTE — PATIENT INSTRUCTIONS
Medicare Preventive Visit Patient Instructions  Thank you for completing your Welcome to Medicare Visit or Medicare Annual Wellness Visit today  Your next wellness visit will be due in one year (8/5/2021)  The screening/preventive services that you may require over the next 5-10 years are detailed below  Some tests may not apply to you based off risk factors and/or age  Screening tests ordered at today's visit but not completed yet may show as past due  Also, please note that scanned in results may not display below  Preventive Screenings:  Service Recommendations Previous Testing/Comments   Colorectal Cancer Screening  * Colonoscopy    * Fecal Occult Blood Test (FOBT)/Fecal Immunochemical Test (FIT)  * Fecal DNA/Cologuard Test  * Flexible Sigmoidoscopy Age: 54-65 years old   Colonoscopy: every 10 years (may be performed more frequently if at higher risk)  OR  FOBT/FIT: every 1 year  OR  Cologuard: every 3 years  OR  Sigmoidoscopy: every 5 years  Screening may be recommended earlier than age 48 if at higher risk for colorectal cancer  Also, an individualized decision between you and your healthcare provider will decide whether screening between the ages of 74-80 would be appropriate  Colonoscopy: 05/07/2015  FOBT/FIT: Not on file  Cologuard: Not on file  Sigmoidoscopy: Not on file         Breast Cancer Screening Age: 36 years old  Frequency: every 1-2 years  Not required if history of left and right mastectomy Mammogram: 12/07/2017       Cervical Cancer Screening Between the ages of 21-29, pap smear recommended once every 3 years  Between the ages of 33-67, can perform pap smear with HPV co-testing every 5 years     Recommendations may differ for women with a history of total hysterectomy, cervical cancer, or abnormal pap smears in past  Pap Smear: Not on file       Hepatitis C Screening Once for adults born between [facility]  More frequently in patients at high risk for Hepatitis C Hep C Antibody: Not on file       Diabetes Screening 1-2 times per year if you're at risk for diabetes or have pre-diabetes Fasting glucose: 90 mg/dL   A1C: No results in last 5 years       Cholesterol Screening Once every 5 years if you don't have a lipid disorder  May order more often based on risk factors  Lipid panel: 11/22/2019         Other Preventive Screenings Covered by Medicare:  1  Abdominal Aortic Aneurysm (AAA) Screening: covered once if your at risk  You're considered to be at risk if you have a family history of AAA  2  Lung Cancer Screening: covers low dose CT scan once per year if you meet all of the following conditions: (1) Age 50-69; (2) No signs or symptoms of lung cancer; (3) Current smoker or have quit smoking within the last 15 years; (4) You have a tobacco smoking history of at least 30 pack years (packs per day multiplied by number of years you smoked); (5) You get a written order from a healthcare provider  3  Glaucoma Screening: covered annually if you're considered high risk: (1) You have diabetes OR (2) Family history of glaucoma OR (3)  aged 48 and older OR (3)  American aged 72 and older  3  Osteoporosis Screening: covered every 2 years if you meet one of the following conditions: (1) You're estrogen deficient and at risk for osteoporosis based off medical history and other findings; (2) Have a vertebral abnormality; (3) On glucocorticoid therapy for more than 3 months; (4) Have primary hyperparathyroidism; (5) On osteoporosis medications and need to assess response to drug therapy  · Last bone density test (DXA Scan): Not on file  5  HIV Screening: covered annually if you're between the age of 12-76  Also covered annually if you are younger than 13 and older than 72 with risk factors for HIV infection  For pregnant patients, it is covered up to 3 times per pregnancy      Immunizations:  Immunization Recommendations   Influenza Vaccine Annual influenza vaccination during flu season is recommended for all persons aged >= 6 months who do not have contraindications   Pneumococcal Vaccine (Prevnar and Pneumovax)  * Prevnar = PCV13  * Pneumovax = PPSV23   Adults 25-60 years old: 1-3 doses may be recommended based on certain risk factors  Adults 72 years old: Prevnar (PCV13) vaccine recommended followed by Pneumovax (PPSV23) vaccine  If already received PPSV23 since turning 65, then PCV13 recommended at least one year after PPSV23 dose  Hepatitis B Vaccine 3 dose series if at intermediate or high risk (ex: diabetes, end stage renal disease, liver disease)   Tetanus (Td) Vaccine - COST NOT COVERED BY MEDICARE PART B Following completion of primary series, a booster dose should be given every 10 years to maintain immunity against tetanus  Td may also be given as tetanus wound prophylaxis  Tdap Vaccine - COST NOT COVERED BY MEDICARE PART B Recommended at least once for all adults  For pregnant patients, recommended with each pregnancy  Shingles Vaccine (Shingrix) - COST NOT COVERED BY MEDICARE PART B  2 shot series recommended in those aged 48 and above     Health Maintenance Due:      Topic Date Due    Hepatitis C Screening  1950    MAMMOGRAM  12/07/2019     Immunizations Due:      Topic Date Due    DTaP,Tdap,and Td Vaccines (1 - Tdap) 09/27/1969    Pneumococcal Vaccine: 65+ Years (1 of 1 - PPSV23) 09/27/2013    Influenza Vaccine  07/01/2020     Advance Directives   What are advance directives? Advance directives are legal documents that state your wishes and plans for medical care  These plans are made ahead of time in case you lose your ability to make decisions for yourself  Advance directives can apply to any medical decision, such as the treatments you want, and if you want to donate organs  What are the types of advance directives? There are many types of advance directives, and each state has rules about how to use them   You may choose a combination of any of the following:  · Living will: This is a written record of the treatment you want  You can also choose which treatments you do not want, which to limit, and which to stop at a certain time  This includes surgery, medicine, IV fluid, and tube feedings  · Durable power of  for healthcare [facility]): This is a written record that states who you want to make healthcare choices for you when you are unable to make them for yourself  This person, called a proxy, is usually a family member or a friend  You may choose more than 1 proxy  · Do not resuscitate (DNR) order:  A DNR order is used in case your heart stops beating or you stop breathing  It is a request not to have certain forms of treatment, such as CPR  A DNR order may be included in other types of advance directives  · Medical directive: This covers the care that you want if you are in a coma, near death, or unable to make decisions for yourself  You can list the treatments you want for each condition  Treatment may include pain medicine, surgery, blood transfusions, dialysis, IV or tube feedings, and a ventilator (breathing machine)  · Values history: This document has questions about your views, beliefs, and how you feel and think about life  This information can help others choose the care that you would choose  Why are advance directives important? An advance directive helps you control your care  Although spoken wishes may be used, it is better to have your wishes written down  Spoken wishes can be misunderstood, or not followed  Treatments may be given even if you do not want them  An advance directive may make it easier for your family to make difficult choices about your care  Weight Management   Why it is important to manage your weight:  Being overweight increases your risk of health conditions such as heart disease, high blood pressure, type 2 diabetes, and certain types of cancer   It can also increase your risk for osteoarthritis, sleep apnea, and other respiratory problems  Aim for a slow, steady weight loss  Even a small amount of weight loss can lower your risk of health problems  How to lose weight safely:  A safe and healthy way to lose weight is to eat fewer calories and get regular exercise  You can lose up about 1 pound a week by decreasing the number of calories you eat by 500 calories each day  Healthy meal plan for weight management:  A healthy meal plan includes a variety of foods, contains fewer calories, and helps you stay healthy  A healthy meal plan includes the following:  · Eat whole-grain foods more often  A healthy meal plan should contain fiber  Fiber is the part of grains, fruits, and vegetables that is not broken down by your body  Whole-grain foods are healthy and provide extra fiber in your diet  Some examples of whole-grain foods are whole-wheat breads and pastas, oatmeal, brown rice, and bulgur  · Eat a variety of vegetables every day  Include dark, leafy greens such as spinach, kale, kaden greens, and mustard greens  Eat yellow and orange vegetables such as carrots, sweet potatoes, and winter squash  · Eat a variety of fruits every day  Choose fresh or canned fruit (canned in its own juice or light syrup) instead of juice  Fruit juice has very little or no fiber  · Eat low-fat dairy foods  Drink fat-free (skim) milk or 1% milk  Eat fat-free yogurt and low-fat cottage cheese  Try low-fat cheeses such as mozzarella and other reduced-fat cheeses  · Choose meat and other protein foods that are low in fat  Choose beans or other legumes such as split peas or lentils  Choose fish, skinless poultry (chicken or turkey), or lean cuts of red meat (beef or pork)  Before you cook meat or poultry, cut off any visible fat  · Use less fat and oil  Try baking foods instead of frying them  Add less fat, such as margarine, sour cream, regular salad dressing and mayonnaise to foods  Eat fewer high-fat foods   Some examples of high-fat foods include french fries, doughnuts, ice cream, and cakes  · Eat fewer sweets  Limit foods and drinks that are high in sugar  This includes candy, cookies, regular soda, and sweetened drinks  Exercise:  Exercise at least 30 minutes per day on most days of the week  Some examples of exercise include walking, biking, dancing, and swimming  You can also fit in more physical activity by taking the stairs instead of the elevator or parking farther away from stores  Ask your healthcare provider about the best exercise plan for you  © Copyright [employer] 2018 Information is for End User's use only and may not be sold, redistributed or otherwise used for commercial purposes   All illustrations and images included in CareNotes® are the copyrighted property of A D A M , Inc  or [address] [place], [place] [address]korey fletcher

## 2020-08-05 NOTE — PROGRESS NOTES
Assessment and Plan:     Problem List Items Addressed This Visit        Endocrine    Hypothyroidism     Will check labs  Relevant Orders    TSH, 3rd generation    T4, free       Cardiovascular and Mediastinum    SVT (supraventricular tachycardia) ([facility] [address] )     Sees her cardiologist in [place]  No recent episodes of SVT         Relevant Orders    Comprehensive metabolic panel    CBC and differential    Hepatitis C antibody       Other    Vitamin D deficiency     Currently taking 4000 units daily  Will check labs         Relevant Medications    Cholecalciferol (Vitamin D3) 50 MCG (2000 UT) TABS    Other Relevant Orders    Vitamin D 25 hydroxy      Other Visit Diagnoses     Medicare annual wellness visit, subsequent    -  Primary    Screening for colon cancer        Relevant Orders    Cologuard    Need for hepatitis C screening test               Preventive health issues were discussed with patient, and age appropriate screening tests were ordered as noted in patient's After Visit Summary  Personalized health advice and appropriate referrals for health education or preventive services given if needed, as noted in patient's After Visit Summary  History of Present Illness:     Patient presents for Medicare Annual Wellness visit    She is requesting labs for vitamin D and her thyroid  Her dose was recently adjusted  No recent episodes of SVT  Feels this correlates with her thyroid levels    Patient Care Team:  Tyrel Chandler as PCP - General (Family Medicine)  Guillermo Landaverde DO     Problem List:     Patient Active Problem List   Diagnosis    Hypothyroidism    Greater trochanteric bursitis of left hip    SVT (supraventricular tachycardia) ([facility] [address] )    Primary insomnia    Vitamin D deficiency      Past Medical and Surgical History:     Past Medical History:   Diagnosis Date    Anxiety     Disease of thyroid gland     Glaucoma     bilateral    Osteoporosis     SVT [facility]supraventricular tachycardia) ([facility] [address] )     Vitamin D deficiency      Past Surgical History:   Procedure Laterality Date    BREAST LUMPECTOMY      CARDIAC ELECTROPHYSIOLOGY STUDY AND ABLATION       SECTION        Family History:     History reviewed  No pertinent family history     Social History:        Social History     Socioeconomic History    Marital status: /Civil Union     Spouse name: None    Number of children: None    Years of education: None    Highest education level: None   Occupational History    None   Social Needs    Financial resource strain: None    Food insecurity     Worry: None     Inability: None    Transportation needs     Medical: None     Non-medical: None   Tobacco Use    Smoking status: Never Smoker    Smokeless tobacco: Never Used   Substance and Sexual Activity    Alcohol use: No    Drug use: No    Sexual activity: None   Lifestyle    Physical activity     Days per week: None     Minutes per session: None    Stress: None   Relationships    Social connections     Talks on phone: None     Gets together: None     Attends [employer] service: None     Active member of club or organization: None     Attends meetings of clubs or organizations: None     Relationship status: None    Intimate partner violence     Fear of current or ex partner: None     Emotionally abused: None     Physically abused: None     Forced sexual activity: None   Other Topics Concern    None   Social History Narrative    None      Medications and Allergies:     Current Outpatient Medications   Medication Sig Dispense Refill    bimatoprost (LUMIGAN) 0 01 % ophthalmic drops Apply 1 drop to eye Daily      Biotin 1 MG CAPS Take 1 capsule by mouth daily      Cholecalciferol (Vitamin D3) 50 MCG (2000 UT) TABS Take 2 tablets (4,000 Units total) by mouth daily 90 tablet 3    ibuprofen (MOTRIN) 200 mg tablet Take 2 tablets (400 mg total) by mouth every 6 (six) hours as needed for mild pain 24 tablet 0    levothyroxine 75 mcg tablet Take 1 tablet (75 mcg total) by mouth daily (Patient taking differently: Take 55 mcg by mouth daily ) 30 tablet 0    ALPRAZolam (XANAX) 0 25 mg tablet Take by mouth daily at bedtime as needed for anxiety      bisoprolol (ZEBETA) 10 MG tablet Take 25 mg by mouth daily       No current facility-administered medications for this visit  No Known Allergies   Immunizations: There is no immunization history on file for this patient  Health Maintenance:         Topic Date Due    Hepatitis C Screening  1950    MAMMOGRAM  12/07/2019         Topic Date Due    DTaP,Tdap,and Td Vaccines (1 - Tdap) 09/27/1969    Pneumococcal Vaccine: 65+ Years (1 of 1 - PPSV23) 09/27/2013    Influenza Vaccine  07/01/2020      Medicare Health Risk Assessment:     /72   Pulse 72   Temp (!) 97 °F (36 1 °C)   Resp 16   Ht 5' 4"   Wt 71 2 kg (157 lb)   LMP 01/20/2000 (Approximate)   BMI 26 95 kg/m²      Mamie HODGSON is here for her Subsequent Wellness visit  Health Risk Assessment:   Patient rates overall health as very good  Patient feels that their physical health rating is same  Eyesight was rated as same  Hearing was rated as same  Patient feels that their emotional and mental health rating is same  Pain experienced in the last 7 days has been none  Patient states that she has experienced no weight loss or gain in last 6 months  Fall Risk Screening: In the past year, patient has experienced: no history of falling in past year      Home Safety:  Patient does not have trouble with stairs inside or outside of their home  Patient has working smoke alarms and has working carbon monoxide detector  Home safety hazards include: none  Nutrition:   Current diet is Regular  Medications:   Patient is not currently taking any over-the-counter supplements  Patient is able to manage medications       Activities of Daily Living (ADLs)/Instrumental Activities of Daily Living (IADLs): Walk and transfer into and out of bed and chair?: Yes  Dress and groom yourself?: Yes    Bathe or shower yourself?: Yes    Feed yourself? Yes  Do your laundry/housekeeping?: Yes  Manage your money, pay your bills and track your expenses?: Yes  Make your own meals?: Yes    Do your own shopping?: Yes    Previous Hospitalizations:   Any hospitalizations or ED visits within the last 12 months?: No      Advance Care Planning:   Living will: Yes    Advanced directive: Yes      Cognitive Screening:   Provider or family/friend/caregiver concerned regarding cognition?: No    PREVENTIVE SCREENINGS      Cardiovascular Screening:    General: Screening Current      Diabetes Screening:     General: Screening Current      Colorectal Cancer Screening:     General: Screening Current      Breast Cancer Screening:     General: Screening Current      Cervical Cancer Screening:    General: Screening Not Indicated      Osteoporosis Screening:    General: Screening Current      Abdominal Aortic Aneurysm (AAA) Screening:        General: Screening Not Indicated      Lung Cancer Screening:     General: Screening Not Indicated      Hepatitis C Screening:      Hep C Screening Accepted: Yes      Other Counseling Topics:   Skin self-exam, sunscreen and calcium and vitamin D intake and regular weightbearing exercise         Darlene Estrada

## 2020-08-07 ENCOUNTER — TELEPHONE (OUTPATIENT)
Dept: ADMINISTRATIVE | Facility: OTHER | Age: 70
End: 2020-08-07

## 2020-08-07 NOTE — TELEPHONE ENCOUNTER
----- Message from Becki Novoa sent at 8/5/2020 10:45 AM EDT -----  08/05/20 10:45 AM    Hello, our patient Shanika Clement has had Mammogram completed/performed  Please assist in updating the patient chart by making an External outreach to Dr Estrella Garcia and/or [facility] facility located in [place], [place]  The date of service is December 2019  There is a previous mammogram from 2017 done at the same facility scanned into her chart      Thank you,  PANCHO Ac  [facility]

## 2020-08-07 NOTE — TELEPHONE ENCOUNTER
Upon review of the In Basket request and the patient's chart, initial outreach has been made via telephone on 8/6/2020, please see Contacts section for details  A second outreach attempt will be made within 5 business days      Thank you  Sindhu Barragan

## 2020-08-12 ENCOUNTER — APPOINTMENT (OUTPATIENT)
Dept: LAB | Facility: CLINIC | Age: 70
End: 2020-08-12
Payer: COMMERCIAL

## 2020-08-12 DIAGNOSIS — I47.1 SVT (SUPRAVENTRICULAR TACHYCARDIA) (HCC): ICD-10-CM

## 2020-08-12 DIAGNOSIS — E55.9 VITAMIN D DEFICIENCY: ICD-10-CM

## 2020-08-12 DIAGNOSIS — E03.9 ACQUIRED HYPOTHYROIDISM: ICD-10-CM

## 2020-08-12 LAB
25(OH)D3 SERPL-MCNC: 43.5 NG/ML (ref 30–100)
ALBUMIN SERPL BCP-MCNC: 3.5 G/DL (ref 3.5–5)
ALP SERPL-CCNC: 96 U/L (ref 46–116)
ALT SERPL W P-5'-P-CCNC: 34 U/L (ref 12–78)
ANION GAP SERPL CALCULATED.3IONS-SCNC: 5 MMOL/L (ref 4–13)
AST SERPL W P-5'-P-CCNC: 24 U/L (ref 5–45)
BASOPHILS # BLD AUTO: 0.04 THOUSANDS/ΜL (ref 0–0.1)
BASOPHILS NFR BLD AUTO: 1 % (ref 0–1)
BILIRUB SERPL-MCNC: 0.56 MG/DL (ref 0.2–1)
BUN SERPL-MCNC: 11 MG/DL (ref 5–25)
CALCIUM SERPL-MCNC: 9.5 MG/DL (ref 8.3–10.1)
CHLORIDE SERPL-SCNC: 110 MMOL/L (ref 100–108)
CO2 SERPL-SCNC: 29 MMOL/L (ref 21–32)
CREAT SERPL-MCNC: 0.65 MG/DL (ref 0.6–1.3)
EOSINOPHIL # BLD AUTO: 0.27 THOUSAND/ΜL (ref 0–0.61)
EOSINOPHIL NFR BLD AUTO: 7 % (ref 0–6)
ERYTHROCYTE [DISTWIDTH] IN BLOOD BY AUTOMATED COUNT: 11.9 % (ref 11.6–15.1)
GFR SERPL CREATININE-BSD FRML MDRD: 90 ML/MIN/1.73SQ M
GLUCOSE P FAST SERPL-MCNC: 88 MG/DL (ref 65–99)
HCT VFR BLD AUTO: 40.6 % (ref 34.8–46.1)
HCV AB SER QL: NORMAL
HGB BLD-MCNC: 13.7 G/DL (ref 11.5–15.4)
IMM GRANULOCYTES # BLD AUTO: 0 THOUSAND/UL (ref 0–0.2)
IMM GRANULOCYTES NFR BLD AUTO: 0 % (ref 0–2)
LYMPHOCYTES # BLD AUTO: 1.96 THOUSANDS/ΜL (ref 0.6–4.47)
LYMPHOCYTES NFR BLD AUTO: 47 % (ref 14–44)
MCH RBC QN AUTO: 32.2 PG (ref 26.8–34.3)
MCHC RBC AUTO-ENTMCNC: 33.7 G/DL (ref 31.4–37.4)
MCV RBC AUTO: 95 FL (ref 82–98)
MONOCYTES # BLD AUTO: 0.45 THOUSAND/ΜL (ref 0.17–1.22)
MONOCYTES NFR BLD AUTO: 11 % (ref 4–12)
NEUTROPHILS # BLD AUTO: 1.4 THOUSANDS/ΜL (ref 1.85–7.62)
NEUTS SEG NFR BLD AUTO: 34 % (ref 43–75)
NRBC BLD AUTO-RTO: 0 /100 WBCS
PLATELET # BLD AUTO: 230 THOUSANDS/UL (ref 149–390)
PMV BLD AUTO: 10.4 FL (ref 8.9–12.7)
POTASSIUM SERPL-SCNC: 4.7 MMOL/L (ref 3.5–5.3)
PROT SERPL-MCNC: 7.1 G/DL (ref 6.4–8.2)
RBC # BLD AUTO: 4.26 MILLION/UL (ref 3.81–5.12)
SODIUM SERPL-SCNC: 144 MMOL/L (ref 136–145)
T4 FREE SERPL-MCNC: 0.87 NG/DL (ref 0.76–1.46)
TSH SERPL DL<=0.05 MIU/L-ACNC: 0.51 UIU/ML (ref 0.36–3.74)
WBC # BLD AUTO: 4.12 THOUSAND/UL (ref 4.31–10.16)

## 2020-08-12 PROCEDURE — 84443 ASSAY THYROID STIM HORMONE: CPT

## 2020-08-12 PROCEDURE — 85025 COMPLETE CBC W/AUTO DIFF WBC: CPT

## 2020-08-12 PROCEDURE — 82306 VITAMIN D 25 HYDROXY: CPT

## 2020-08-12 PROCEDURE — 36415 COLL VENOUS BLD VENIPUNCTURE: CPT

## 2020-08-12 PROCEDURE — 86803 HEPATITIS C AB TEST: CPT

## 2020-08-12 PROCEDURE — 84439 ASSAY OF FREE THYROXINE: CPT

## 2020-08-12 PROCEDURE — 80053 COMPREHEN METABOLIC PANEL: CPT

## 2020-08-13 ENCOUNTER — TELEPHONE (OUTPATIENT)
Dept: FAMILY MEDICINE CLINIC | Facility: CLINIC | Age: 70
End: 2020-08-13

## 2020-08-13 PROBLEM — D70.8 OTHER NEUTROPENIA (HCC): Status: ACTIVE | Noted: 2020-08-13

## 2020-08-13 NOTE — TELEPHONE ENCOUNTER
Discussed results w/ pt, overall labs are very good, WBC is slightly low  She reports this is not new and they have been low for as long as she can remember  She is asymptomatic and rarely gets sick  This was worked up in [place] in the past   Will continue to monitor   Macel Hedge

## 2020-08-26 NOTE — TELEPHONE ENCOUNTER
As a follow-up, a second attempt has been made for outreach via telephone, please see Contacts section for details  A third and final attempt will be made within 5 business days      Thank you  Mary Fajardo

## 2020-11-30 ENCOUNTER — VBI (OUTPATIENT)
Dept: ADMINISTRATIVE | Facility: OTHER | Age: 70
End: 2020-11-30

## 2021-08-24 ENCOUNTER — TELEPHONE (OUTPATIENT)
Dept: FAMILY MEDICINE CLINIC | Facility: CLINIC | Age: 71
End: 2021-08-24

## 2021-08-24 DIAGNOSIS — E03.9 ACQUIRED HYPOTHYROIDISM: Primary | ICD-10-CM

## 2021-08-24 DIAGNOSIS — I47.1 SVT (SUPRAVENTRICULAR TACHYCARDIA) (HCC): ICD-10-CM

## 2021-08-24 DIAGNOSIS — E55.9 VITAMIN D DEFICIENCY: ICD-10-CM

## 2021-08-24 DIAGNOSIS — D70.8 OTHER NEUTROPENIA (HCC): ICD-10-CM

## 2021-08-24 NOTE — TELEPHONE ENCOUNTER
Patient has an upcoming appt for AWV  She is requesting an order for her routine blood work  She mentioned Vit D and thyroid levels  She is aware [address] [facility] is off this week and will hear back from us next week when the orders are placed     Amish Gamino MA

## 2021-08-30 NOTE — TELEPHONE ENCOUNTER
Spoke with patient and she is aware lab work is in her chart    She stated she will be going to a [facility] lab    No further action needed

## 2021-09-03 ENCOUNTER — APPOINTMENT (OUTPATIENT)
Dept: LAB | Facility: CLINIC | Age: 71
End: 2021-09-03
Payer: COMMERCIAL

## 2021-09-03 DIAGNOSIS — I47.1 SVT (SUPRAVENTRICULAR TACHYCARDIA) (HCC): ICD-10-CM

## 2021-09-03 DIAGNOSIS — E03.9 ACQUIRED HYPOTHYROIDISM: ICD-10-CM

## 2021-09-03 DIAGNOSIS — D70.8 OTHER NEUTROPENIA (HCC): ICD-10-CM

## 2021-09-03 DIAGNOSIS — E55.9 VITAMIN D DEFICIENCY: ICD-10-CM

## 2021-09-03 LAB
25(OH)D3 SERPL-MCNC: 53 NG/ML (ref 30–100)
ALBUMIN SERPL BCP-MCNC: 3.5 G/DL (ref 3.5–5)
ALP SERPL-CCNC: 93 U/L (ref 46–116)
ALT SERPL W P-5'-P-CCNC: 34 U/L (ref 12–78)
ANION GAP SERPL CALCULATED.3IONS-SCNC: 4 MMOL/L (ref 4–13)
AST SERPL W P-5'-P-CCNC: 26 U/L (ref 5–45)
BASOPHILS # BLD AUTO: 0.05 THOUSANDS/ΜL (ref 0–0.1)
BASOPHILS NFR BLD AUTO: 1 % (ref 0–1)
BILIRUB SERPL-MCNC: 0.41 MG/DL (ref 0.2–1)
BUN SERPL-MCNC: 13 MG/DL (ref 5–25)
CALCIUM SERPL-MCNC: 9.1 MG/DL (ref 8.3–10.1)
CHLORIDE SERPL-SCNC: 107 MMOL/L (ref 100–108)
CHOLEST SERPL-MCNC: 213 MG/DL (ref 50–200)
CO2 SERPL-SCNC: 30 MMOL/L (ref 21–32)
CREAT SERPL-MCNC: 0.71 MG/DL (ref 0.6–1.3)
EOSINOPHIL # BLD AUTO: 0.21 THOUSAND/ΜL (ref 0–0.61)
EOSINOPHIL NFR BLD AUTO: 6 % (ref 0–6)
ERYTHROCYTE [DISTWIDTH] IN BLOOD BY AUTOMATED COUNT: 12 % (ref 11.6–15.1)
GFR SERPL CREATININE-BSD FRML MDRD: 85 ML/MIN/1.73SQ M
GLUCOSE P FAST SERPL-MCNC: 57 MG/DL (ref 65–99)
HCT VFR BLD AUTO: 39.1 % (ref 34.8–46.1)
HDLC SERPL-MCNC: 64 MG/DL
HGB BLD-MCNC: 13.1 G/DL (ref 11.5–15.4)
IMM GRANULOCYTES # BLD AUTO: 0 THOUSAND/UL (ref 0–0.2)
IMM GRANULOCYTES NFR BLD AUTO: 0 % (ref 0–2)
LDLC SERPL CALC-MCNC: 127 MG/DL (ref 0–100)
LYMPHOCYTES # BLD AUTO: 1.72 THOUSANDS/ΜL (ref 0.6–4.47)
LYMPHOCYTES NFR BLD AUTO: 46 % (ref 14–44)
MCH RBC QN AUTO: 32.2 PG (ref 26.8–34.3)
MCHC RBC AUTO-ENTMCNC: 33.5 G/DL (ref 31.4–37.4)
MCV RBC AUTO: 96 FL (ref 82–98)
MONOCYTES # BLD AUTO: 0.43 THOUSAND/ΜL (ref 0.17–1.22)
MONOCYTES NFR BLD AUTO: 11 % (ref 4–12)
NEUTROPHILS # BLD AUTO: 1.35 THOUSANDS/ΜL (ref 1.85–7.62)
NEUTS SEG NFR BLD AUTO: 36 % (ref 43–75)
NONHDLC SERPL-MCNC: 149 MG/DL
NRBC BLD AUTO-RTO: 0 /100 WBCS
PLATELET # BLD AUTO: 232 THOUSANDS/UL (ref 149–390)
PMV BLD AUTO: 10.4 FL (ref 8.9–12.7)
POTASSIUM SERPL-SCNC: 3.9 MMOL/L (ref 3.5–5.3)
PROT SERPL-MCNC: 6.7 G/DL (ref 6.4–8.2)
RBC # BLD AUTO: 4.07 MILLION/UL (ref 3.81–5.12)
SODIUM SERPL-SCNC: 141 MMOL/L (ref 136–145)
T4 FREE SERPL-MCNC: 0.92 NG/DL (ref 0.76–1.46)
TRIGL SERPL-MCNC: 108 MG/DL
TSH SERPL DL<=0.05 MIU/L-ACNC: 2.17 UIU/ML (ref 0.36–3.74)
WBC # BLD AUTO: 3.76 THOUSAND/UL (ref 4.31–10.16)

## 2021-09-03 PROCEDURE — 36415 COLL VENOUS BLD VENIPUNCTURE: CPT

## 2021-09-03 PROCEDURE — 82306 VITAMIN D 25 HYDROXY: CPT

## 2021-09-03 PROCEDURE — 80061 LIPID PANEL: CPT

## 2021-09-03 PROCEDURE — 84443 ASSAY THYROID STIM HORMONE: CPT

## 2021-09-03 PROCEDURE — 84439 ASSAY OF FREE THYROXINE: CPT

## 2021-09-03 PROCEDURE — 85025 COMPLETE CBC W/AUTO DIFF WBC: CPT

## 2021-09-03 PROCEDURE — 80053 COMPREHEN METABOLIC PANEL: CPT

## 2021-09-07 ENCOUNTER — OFFICE VISIT (OUTPATIENT)
Dept: FAMILY MEDICINE CLINIC | Facility: CLINIC | Age: 71
End: 2021-09-07
Payer: COMMERCIAL

## 2021-09-07 VITALS
WEIGHT: 158 LBS | TEMPERATURE: 98 F | BODY MASS INDEX: 28 KG/M2 | DIASTOLIC BLOOD PRESSURE: 72 MMHG | HEART RATE: 60 BPM | HEIGHT: 63 IN | SYSTOLIC BLOOD PRESSURE: 124 MMHG

## 2021-09-07 DIAGNOSIS — D70.8 OTHER NEUTROPENIA (HCC): ICD-10-CM

## 2021-09-07 DIAGNOSIS — E55.9 VITAMIN D DEFICIENCY: ICD-10-CM

## 2021-09-07 DIAGNOSIS — Z00.00 ENCOUNTER FOR SUBSEQUENT ANNUAL WELLNESS VISIT (AWV) IN MEDICARE PATIENT: ICD-10-CM

## 2021-09-07 DIAGNOSIS — Z00.00 MEDICARE ANNUAL WELLNESS VISIT, SUBSEQUENT: ICD-10-CM

## 2021-09-07 DIAGNOSIS — I47.1 SVT (SUPRAVENTRICULAR TACHYCARDIA) (HCC): ICD-10-CM

## 2021-09-07 DIAGNOSIS — E03.9 ACQUIRED HYPOTHYROIDISM: Primary | ICD-10-CM

## 2021-09-07 PROCEDURE — G0439 PPPS, SUBSEQ VISIT: HCPCS | Performed by: NURSE PRACTITIONER

## 2021-09-07 PROCEDURE — 99214 OFFICE O/P EST MOD 30 MIN: CPT | Performed by: NURSE PRACTITIONER

## 2021-09-07 PROCEDURE — 3288F FALL RISK ASSESSMENT DOCD: CPT | Performed by: NURSE PRACTITIONER

## 2021-09-07 PROCEDURE — 1170F FXNL STATUS ASSESSED: CPT | Performed by: NURSE PRACTITIONER

## 2021-09-07 PROCEDURE — 1125F AMNT PAIN NOTED PAIN PRSNT: CPT | Performed by: NURSE PRACTITIONER

## 2021-09-07 PROCEDURE — 1036F TOBACCO NON-USER: CPT | Performed by: NURSE PRACTITIONER

## 2021-09-07 PROCEDURE — 3725F SCREEN DEPRESSION PERFORMED: CPT | Performed by: NURSE PRACTITIONER

## 2021-09-07 PROCEDURE — 3008F BODY MASS INDEX DOCD: CPT | Performed by: NURSE PRACTITIONER

## 2021-09-07 PROCEDURE — 1160F RVW MEDS BY RX/DR IN RCRD: CPT | Performed by: NURSE PRACTITIONER

## 2021-09-07 RX ORDER — BISOPROLOL FUMARATE 10 MG/1
25 TABLET ORAL DAILY PRN
Qty: 30 TABLET | Refills: 0
Start: 2021-09-07

## 2021-09-07 NOTE — PROGRESS NOTES
Ashley Avila is here for her Subsequent Wellness visit  Last Medicare Wellness visit information reviewed, patient interviewed, no change since last AWV  Health Risk Assessment:   Patient rates overall health as very good  Patient feels that their physical health rating is slightly better  Patient is very satisfied with their life  Eyesight was rated as same  Hearing was rated as same  Patient feels that their emotional and mental health rating is same  Patients states they are never, rarely angry  Patient states they are sometimes unusually tired/fatigued  Pain experienced in the last 7 days has been none  Patient states that she has experienced weight loss or gain in last 6 months  Depression Screening:   PHQ-2 Score: 0      Fall Risk Screening: In the past year, patient has experienced: no history of falling in past year      Urinary Incontinence Screening:   Patient has not leaked urine accidently in the last six months  Home Safety:  Patient has trouble with stairs inside or outside of their home  Patient has working smoke alarms and has working carbon monoxide detector  Home safety hazards include: none  Medications:   Patient is not currently taking any over-the-counter supplements  Patient is able to manage medications  Activities of Daily Living (ADLs)/Instrumental Activities of Daily Living (IADLs):   Walk and transfer into and out of bed and chair?: Yes  Dress and groom yourself?: Yes    Bathe or shower yourself?: Yes    Feed yourself? Yes  Do your laundry/housekeeping?: Yes  Manage your money, pay your bills and track your expenses?: Yes  Make your own meals?: Yes    Do your own shopping?: Yes    Previous Hospitalizations:   Any hospitalizations or ED visits within the last 12 months?: No      Advance Care Planning:   Living will: Yes    Durable POA for healthcare:  Yes    Advanced directive: Yes      Cognitive Screening:   Provider or family/friend/caregiver concerned regarding cognition?: No    PREVENTIVE SCREENINGS      Cardiovascular Screening:    General: Screening Current      Diabetes Screening:     General: Screening Current      Colorectal Cancer Screening:     General: Screening Current      Breast Cancer Screening:     General: Screening Current      Cervical Cancer Screening:    General: Screening Not Indicated      Osteoporosis Screening:    General: Risks and Benefits Discussed and Patient Declines      Abdominal Aortic Aneurysm (AAA) Screening:        General: Screening Not Indicated      Lung Cancer Screening:     General: Screening Not Indicated      Hepatitis C Screening:    General: Screening Current    Screening, Brief Intervention, and Referral to Treatment (SBIRT)    Screening  Typical number of drinks in a day: 0  Typical number of drinks in a week: 0  Interpretation: Low risk drinking behavior  Single Item Drug Screening:  How often have you used an illegal drug (including marijuana) or a prescription medication for non-medical reasons in the past year? never    Single Item Drug Screen Score: 0  Interpretation: Negative screen for possible drug use disorder    Brief Intervention  Alcohol & drug use screenings were reviewed  No concerns regarding substance use disorder identified  Other Counseling Topics:   Calcium and vitamin D intake and regular weightbearing exercise

## 2021-09-07 NOTE — PATIENT INSTRUCTIONS
Medicare Preventive Visit Patient Instructions  Thank you for completing your Welcome to Medicare Visit or Medicare Annual Wellness Visit today  Your next wellness visit will be due in one year (9/8/2022)  The screening/preventive services that you may require over the next 5-10 years are detailed below  Some tests may not apply to you based off risk factors and/or age  Screening tests ordered at today's visit but not completed yet may show as past due  Also, please note that scanned in results may not display below  Preventive Screenings:  Service Recommendations Previous Testing/Comments   Colorectal Cancer Screening  * Colonoscopy    * Fecal Occult Blood Test (FOBT)/Fecal Immunochemical Test (FIT)  * Fecal DNA/Cologuard Test  * Flexible Sigmoidoscopy Age: 54-65 years old   Colonoscopy: every 10 years (may be performed more frequently if at higher risk)  OR  FOBT/FIT: every 1 year  OR  Cologuard: every 3 years  OR  Sigmoidoscopy: every 5 years  Screening may be recommended earlier than age 48 if at higher risk for colorectal cancer  Also, an individualized decision between you and your healthcare provider will decide whether screening between the ages of 74-80 would be appropriate  Colonoscopy: 05/07/2015  FOBT/FIT: Not on file  Cologuard: Not on file  Sigmoidoscopy: Not on file          Breast Cancer Screening Age: 36 years old  Frequency: every 1-2 years  Not required if history of left and right mastectomy Mammogram: 12/07/2017        Cervical Cancer Screening Between the ages of 21-29, pap smear recommended once every 3 years  Between the ages of 33-67, can perform pap smear with HPV co-testing every 5 years     Recommendations may differ for women with a history of total hysterectomy, cervical cancer, or abnormal pap smears in past  Pap Smear: Not on file        Hepatitis C Screening Once for adults born between [facility]  More frequently in patients at high risk for Hepatitis C Hep C Antibody: 08/12/2020        Diabetes Screening 1-2 times per year if you're at risk for diabetes or have pre-diabetes Fasting glucose: 57 mg/dL   A1C: No results in last 5 years        Cholesterol Screening Once every 5 years if you don't have a lipid disorder  May order more often based on risk factors  Lipid panel: 09/03/2021          Other Preventive Screenings Covered by Medicare:  1  Abdominal Aortic Aneurysm (AAA) Screening: covered once if your at risk  You're considered to be at risk if you have a family history of AAA  2  Lung Cancer Screening: covers low dose CT scan once per year if you meet all of the following conditions: (1) Age 50-69; (2) No signs or symptoms of lung cancer; (3) Current smoker or have quit smoking within the last 15 years; (4) You have a tobacco smoking history of at least 30 pack years (packs per day multiplied by number of years you smoked); (5) You get a written order from a healthcare provider  3  Glaucoma Screening: covered annually if you're considered high risk: (1) You have diabetes OR (2) Family history of glaucoma OR (3)  aged 48 and older OR (3)  American aged 72 and older  3  Osteoporosis Screening: covered every 2 years if you meet one of the following conditions: (1) You're estrogen deficient and at risk for osteoporosis based off medical history and other findings; (2) Have a vertebral abnormality; (3) On glucocorticoid therapy for more than 3 months; (4) Have primary hyperparathyroidism; (5) On osteoporosis medications and need to assess response to drug therapy  · Last bone density test (DXA Scan): Not on file  5  HIV Screening: covered annually if you're between the age of 12-76  Also covered annually if you are younger than 13 and older than 72 with risk factors for HIV infection  For pregnant patients, it is covered up to 3 times per pregnancy      Immunizations:  Immunization Recommendations   Influenza Vaccine Annual influenza vaccination during flu season is recommended for all persons aged >= 6 months who do not have contraindications   Pneumococcal Vaccine (Prevnar and Pneumovax)  * Prevnar = PCV13  * Pneumovax = PPSV23   Adults 25-60 years old: 1-3 doses may be recommended based on certain risk factors  Adults 72 years old: Prevnar (PCV13) vaccine recommended followed by Pneumovax (PPSV23) vaccine  If already received PPSV23 since turning 65, then PCV13 recommended at least one year after PPSV23 dose  Hepatitis B Vaccine 3 dose series if at intermediate or high risk (ex: diabetes, end stage renal disease, liver disease)   Tetanus (Td) Vaccine - COST NOT COVERED BY MEDICARE PART B Following completion of primary series, a booster dose should be given every 10 years to maintain immunity against tetanus  Td may also be given as tetanus wound prophylaxis  Tdap Vaccine - COST NOT COVERED BY MEDICARE PART B Recommended at least once for all adults  For pregnant patients, recommended with each pregnancy  Shingles Vaccine (Shingrix) - COST NOT COVERED BY MEDICARE PART B  2 shot series recommended in those aged 48 and above     Health Maintenance Due:      Topic Date Due    Breast Cancer Screening: Mammogram  12/07/2019    Colorectal Cancer Screening  05/07/2020    Hepatitis C Screening  Completed     Immunizations Due:      Topic Date Due    COVID-19 Vaccine (1) Never done    DTaP,Tdap,and Td Vaccines (1 - Tdap) Never done    Pneumococcal Vaccine: 65+ Years (1 of 1 - PPSV23) Never done    Influenza Vaccine (1) 09/01/2021     Advance Directives   What are advance directives? Advance directives are legal documents that state your wishes and plans for medical care  These plans are made ahead of time in case you lose your ability to make decisions for yourself  Advance directives can apply to any medical decision, such as the treatments you want, and if you want to donate organs  What are the types of advance directives?   There are many types of pressure, type 2 diabetes, and certain types of cancer  It can also increase your risk for osteoarthritis, sleep apnea, and other respiratory problems  Aim for a slow, steady weight loss  Even a small amount of weight loss can lower your risk of health problems  How to lose weight safely:  A safe and healthy way to lose weight is to eat fewer calories and get regular exercise  You can lose up about 1 pound a week by decreasing the number of calories you eat by 500 calories each day  Healthy meal plan for weight management:  A healthy meal plan includes a variety of foods, contains fewer calories, and helps you stay healthy  A healthy meal plan includes the following:  · Eat whole-grain foods more often  A healthy meal plan should contain fiber  Fiber is the part of grains, fruits, and vegetables that is not broken down by your body  Whole-grain foods are healthy and provide extra fiber in your diet  Some examples of whole-grain foods are whole-wheat breads and pastas, oatmeal, brown rice, and bulgur  · Eat a variety of vegetables every day  Include dark, leafy greens such as spinach, kale, kaden greens, and mustard greens  Eat yellow and orange vegetables such as carrots, sweet potatoes, and winter squash  · Eat a variety of fruits every day  Choose fresh or canned fruit (canned in its own juice or light syrup) instead of juice  Fruit juice has very little or no fiber  · Eat low-fat dairy foods  Drink fat-free (skim) milk or 1% milk  Eat fat-free yogurt and low-fat cottage cheese  Try low-fat cheeses such as mozzarella and other reduced-fat cheeses  · Choose meat and other protein foods that are low in fat  Choose beans or other legumes such as split peas or lentils  Choose fish, skinless poultry (chicken or turkey), or lean cuts of red meat (beef or pork)  Before you cook meat or poultry, cut off any visible fat  · Use less fat and oil  Try baking foods instead of frying them   Add less fat, such as margarine, sour cream, regular salad dressing and mayonnaise to foods  Eat fewer high-fat foods  Some examples of high-fat foods include french fries, doughnuts, ice cream, and cakes  · Eat fewer sweets  Limit foods and drinks that are high in sugar  This includes candy, cookies, regular soda, and sweetened drinks  Exercise:  Exercise at least 30 minutes per day on most days of the week  Some examples of exercise include walking, biking, dancing, and swimming  You can also fit in more physical activity by taking the stairs instead of the elevator or parking farther away from stores  Ask your healthcare provider about the best exercise plan for you  © Copyright [employer] 2018 Information is for End User's use only and may not be sold, redistributed or otherwise used for commercial purposes  All illustrations and images included in CareNotes® are the copyrighted property of [employer]  or [facility] Preventive Visit Patient Instructions  Thank you for completing your Welcome to Medicare Visit or Medicare Annual Wellness Visit today  Your next wellness visit will be due in one year (9/8/2022)  The screening/preventive services that you may require over the next 5-10 years are detailed below  Some tests may not apply to you based off risk factors and/or age  Screening tests ordered at today's visit but not completed yet may show as past due  Also, please note that scanned in results may not display below    Preventive Screenings:  Service Recommendations Previous Testing/Comments   Colorectal Cancer Screening  * Colonoscopy    * Fecal Occult Blood Test (FOBT)/Fecal Immunochemical Test (FIT)  * Fecal DNA/Cologuard Test  * Flexible Sigmoidoscopy Age: 54-65 years old   Colonoscopy: every 10 years (may be performed more frequently if at higher risk)  OR  FOBT/FIT: every 1 year  OR  Cologuard: every 3 years  OR  Sigmoidoscopy: every 5 years  Screening may be recommended earlier than age 50 if at higher risk for colorectal cancer  Also, an individualized decision between you and your healthcare provider will decide whether screening between the ages of 74-80 would be appropriate  Colonoscopy: 05/07/2015  FOBT/FIT: Not on file  Cologuard: Not on file  Sigmoidoscopy: Not on file          Breast Cancer Screening Age: 36 years old  Frequency: every 1-2 years  Not required if history of left and right mastectomy Mammogram: 12/07/2017        Cervical Cancer Screening Between the ages of 21-29, pap smear recommended once every 3 years  Between the ages of 33-67, can perform pap smear with HPV co-testing every 5 years  Recommendations may differ for women with a history of total hysterectomy, cervical cancer, or abnormal pap smears in past  Pap Smear: Not on file        Hepatitis C Screening Once for adults born between 1945 and 1965  More frequently in patients at high risk for Hepatitis C Hep C Antibody: 08/12/2020        Diabetes Screening 1-2 times per year if you're at risk for diabetes or have pre-diabetes Fasting glucose: 57 mg/dL   A1C: No results in last 5 years        Cholesterol Screening Once every 5 years if you don't have a lipid disorder  May order more often based on risk factors  Lipid panel: 09/03/2021          Other Preventive Screenings Covered by Medicare:  6  Abdominal Aortic Aneurysm (AAA) Screening: covered once if your at risk  You're considered to be at risk if you have a family history of AAA  7  Lung Cancer Screening: covers low dose CT scan once per year if you meet all of the following conditions: (1) Age 50-69; (2) No signs or symptoms of lung cancer; (3) Current smoker or have quit smoking within the last 15 years; (4) You have a tobacco smoking history of at least 30 pack years (packs per day multiplied by number of years you smoked); (5) You get a written order from a healthcare provider    8  Glaucoma Screening: covered annually if you're considered high risk: (1) You have diabetes OR (2) Family history of glaucoma OR (3)  aged 48 and older OR (3)  American aged 72 and older  5  Osteoporosis Screening: covered every 2 years if you meet one of the following conditions: (1) You're estrogen deficient and at risk for osteoporosis based off medical history and other findings; (2) Have a vertebral abnormality; (3) On glucocorticoid therapy for more than 3 months; (4) Have primary hyperparathyroidism; (5) On osteoporosis medications and need to assess response to drug therapy  · Last bone density test (DXA Scan): Not on file  10  HIV Screening: covered annually if you're between the age of 12-76  Also covered annually if you are younger than 13 and older than 72 with risk factors for HIV infection  For pregnant patients, it is covered up to 3 times per pregnancy  Immunizations:  Immunization Recommendations   Influenza Vaccine Annual influenza vaccination during flu season is recommended for all persons aged >= 6 months who do not have contraindications   Pneumococcal Vaccine (Prevnar and Pneumovax)  * Prevnar = PCV13  * Pneumovax = PPSV23   Adults 25-60 years old: 1-3 doses may be recommended based on certain risk factors  Adults 72 years old: Prevnar (PCV13) vaccine recommended followed by Pneumovax (PPSV23) vaccine  If already received PPSV23 since turning 65, then PCV13 recommended at least one year after PPSV23 dose  Hepatitis B Vaccine 3 dose series if at intermediate or high risk (ex: diabetes, end stage renal disease, liver disease)   Tetanus (Td) Vaccine - COST NOT COVERED BY MEDICARE PART B Following completion of primary series, a booster dose should be given every 10 years to maintain immunity against tetanus  Td may also be given as tetanus wound prophylaxis  Tdap Vaccine - COST NOT COVERED BY MEDICARE PART B Recommended at least once for all adults  For pregnant patients, recommended with each pregnancy     Shingles Vaccine (Shingrix) - COST NOT COVERED BY MEDICARE PART B  2 shot series recommended in those aged 48 and above     Health Maintenance Due:      Topic Date Due    Breast Cancer Screening: Mammogram  12/07/2019    Colorectal Cancer Screening  05/07/2020    Hepatitis C Screening  Completed     Immunizations Due:      Topic Date Due    COVID-19 Vaccine (1) Never done    DTaP,Tdap,and Td Vaccines (1 - Tdap) Never done    Pneumococcal Vaccine: 65+ Years (1 of 1 - PPSV23) Never done    Influenza Vaccine (1) 09/01/2021     Advance Directives   What are advance directives? Advance directives are legal documents that state your wishes and plans for medical care  These plans are made ahead of time in case you lose your ability to make decisions for yourself  Advance directives can apply to any medical decision, such as the treatments you want, and if you want to donate organs  What are the types of advance directives? There are many types of advance directives, and each state has rules about how to use them  You may choose a combination of any of the following:  · Living will: This is a written record of the treatment you want  You can also choose which treatments you do not want, which to limit, and which to stop at a certain time  This includes surgery, medicine, IV fluid, and tube feedings  · Durable power of  for healthcare [facility]): This is a written record that states who you want to make healthcare choices for you when you are unable to make them for yourself  This person, called a proxy, is usually a family member or a friend  You may choose more than 1 proxy  · Do not resuscitate (DNR) order:  A DNR order is used in case your heart stops beating or you stop breathing  It is a request not to have certain forms of treatment, such as CPR  A DNR order may be included in other types of advance directives  · Medical directive:   This covers the care that you want if you are in a coma, near death, or unable to make decisions for yourself  You can list the treatments you want for each condition  Treatment may include pain medicine, surgery, blood transfusions, dialysis, IV or tube feedings, and a ventilator (breathing machine)  · Values history: This document has questions about your views, beliefs, and how you feel and think about life  This information can help others choose the care that you would choose  Why are advance directives important? An advance directive helps you control your care  Although spoken wishes may be used, it is better to have your wishes written down  Spoken wishes can be misunderstood, or not followed  Treatments may be given even if you do not want them  An advance directive may make it easier for your family to make difficult choices about your care  Weight Management   Why it is important to manage your weight:  Being overweight increases your risk of health conditions such as heart disease, high blood pressure, type 2 diabetes, and certain types of cancer  It can also increase your risk for osteoarthritis, sleep apnea, and other respiratory problems  Aim for a slow, steady weight loss  Even a small amount of weight loss can lower your risk of health problems  How to lose weight safely:  A safe and healthy way to lose weight is to eat fewer calories and get regular exercise  You can lose up about 1 pound a week by decreasing the number of calories you eat by 500 calories each day  Healthy meal plan for weight management:  A healthy meal plan includes a variety of foods, contains fewer calories, and helps you stay healthy  A healthy meal plan includes the following:  · Eat whole-grain foods more often  A healthy meal plan should contain fiber  Fiber is the part of grains, fruits, and vegetables that is not broken down by your body  Whole-grain foods are healthy and provide extra fiber in your diet   Some examples of whole-grain foods are whole-wheat breads and pastas, oatmeal, brown rice, and bulgur  · Eat a variety of vegetables every day  Include dark, leafy greens such as spinach, kale, kaden greens, and mustard greens  Eat yellow and orange vegetables such as carrots, sweet potatoes, and winter squash  · Eat a variety of fruits every day  Choose fresh or canned fruit (canned in its own juice or light syrup) instead of juice  Fruit juice has very little or no fiber  · Eat low-fat dairy foods  Drink fat-free (skim) milk or 1% milk  Eat fat-free yogurt and low-fat cottage cheese  Try low-fat cheeses such as mozzarella and other reduced-fat cheeses  · Choose meat and other protein foods that are low in fat  Choose beans or other legumes such as split peas or lentils  Choose fish, skinless poultry (chicken or turkey), or lean cuts of red meat (beef or pork)  Before you cook meat or poultry, cut off any visible fat  · Use less fat and oil  Try baking foods instead of frying them  Add less fat, such as margarine, sour cream, regular salad dressing and mayonnaise to foods  Eat fewer high-fat foods  Some examples of high-fat foods include french fries, doughnuts, ice cream, and cakes  · Eat fewer sweets  Limit foods and drinks that are high in sugar  This includes candy, cookies, regular soda, and sweetened drinks  Exercise:  Exercise at least 30 minutes per day on most days of the week  Some examples of exercise include walking, biking, dancing, and swimming  You can also fit in more physical activity by taking the stairs instead of the elevator or parking farther away from stores  Ask your healthcare provider about the best exercise plan for you  © Copyright [employer] 2018 Information is for End User's use only and may not be sold, redistributed or otherwise used for commercial purposes   All illustrations and images included in CareNotes® are the copyrighted property of [employer]  or [facility] [employer]

## 2021-09-07 NOTE — PROGRESS NOTES
Assessment/Plan:    1  Acquired hypothyroidism  Assessment & Plan:  Labs up to date, continue current dose of Levothyroxine      2  SVT (supraventricular tachycardia) (HCC)  Assessment & Plan:  Stable, no recent episodes  Orders:  -     bisoprolol (ZEBETA) 10 MG tablet; Take 2 5 tablets (25 mg total) by mouth daily as needed (SVT)    3  Other neutropenia (HCC)  Assessment & Plan:  Stable  Will continue with surveillance  4  Medicare annual wellness visit, subsequent    5  Encounter for subsequent annual wellness visit (AWV) in Medicare patient    6  Vitamin D deficiency  Assessment & Plan:  Continues with daily Vitamin D supplementation            Patient Instructions       Medicare Preventive Visit Patient Instructions  Thank you for completing your Welcome to Medicare Visit or Medicare Annual Wellness Visit today  Your next wellness visit will be due in one year (9/8/2022)  The screening/preventive services that you may require over the next 5-10 years are detailed below  Some tests may not apply to you based off risk factors and/or age  Screening tests ordered at today's visit but not completed yet may show as past due  Also, please note that scanned in results may not display below  Preventive Screenings:  Service Recommendations Previous Testing/Comments   Colorectal Cancer Screening  * Colonoscopy    * Fecal Occult Blood Test (FOBT)/Fecal Immunochemical Test (FIT)  * Fecal DNA/Cologuard Test  * Flexible Sigmoidoscopy Age: 54-65 years old   Colonoscopy: every 10 years (may be performed more frequently if at higher risk)  OR  FOBT/FIT: every 1 year  OR  Cologuard: every 3 years  OR  Sigmoidoscopy: every 5 years  Screening may be recommended earlier than age 48 if at higher risk for colorectal cancer  Also, an individualized decision between you and your healthcare provider will decide whether screening between the ages of 74-80 would be appropriate   Colonoscopy: 05/07/2015  FOBT/FIT: Not on file  Cologuard: Not on file  Sigmoidoscopy: Not on file          Breast Cancer Screening Age: 36 years old  Frequency: every 1-2 years  Not required if history of left and right mastectomy Mammogram: 12/07/2017        Cervical Cancer Screening Between the ages of 21-29, pap smear recommended once every 3 years  Between the ages of 33-67, can perform pap smear with HPV co-testing every 5 years  Recommendations may differ for women with a history of total hysterectomy, cervical cancer, or abnormal pap smears in past  Pap Smear: Not on file        Hepatitis C Screening Once for adults born between 1945 and 1965  More frequently in patients at high risk for Hepatitis C Hep C Antibody: 08/12/2020        Diabetes Screening 1-2 times per year if you're at risk for diabetes or have pre-diabetes Fasting glucose: 57 mg/dL   A1C: No results in last 5 years        Cholesterol Screening Once every 5 years if you don't have a lipid disorder  May order more often based on risk factors  Lipid panel: 09/03/2021          Other Preventive Screenings Covered by Medicare:  1  Abdominal Aortic Aneurysm (AAA) Screening: covered once if your at risk  You're considered to be at risk if you have a family history of AAA  2  Lung Cancer Screening: covers low dose CT scan once per year if you meet all of the following conditions: (1) Age 50-69; (2) No signs or symptoms of lung cancer; (3) Current smoker or have quit smoking within the last 15 years; (4) You have a tobacco smoking history of at least 30 pack years (packs per day multiplied by number of years you smoked); (5) You get a written order from a healthcare provider  3  Glaucoma Screening: covered annually if you're considered high risk: (1) You have diabetes OR (2) Family history of glaucoma OR (3)  aged 48 and older OR (3)  American aged 72 and older  3   Osteoporosis Screening: covered every 2 years if you meet one of the following conditions: (1) You're estrogen deficient and at risk for osteoporosis based off medical history and other findings; (2) Have a vertebral abnormality; (3) On glucocorticoid therapy for more than 3 months; (4) Have primary hyperparathyroidism; (5) On osteoporosis medications and need to assess response to drug therapy  · Last bone density test (DXA Scan): Not on file  5  HIV Screening: covered annually if you're between the age of 12-76  Also covered annually if you are younger than 13 and older than 72 with risk factors for HIV infection  For pregnant patients, it is covered up to 3 times per pregnancy  Immunizations:  Immunization Recommendations   Influenza Vaccine Annual influenza vaccination during flu season is recommended for all persons aged >= 6 months who do not have contraindications   Pneumococcal Vaccine (Prevnar and Pneumovax)  * Prevnar = PCV13  * Pneumovax = PPSV23   Adults 25-60 years old: 1-3 doses may be recommended based on certain risk factors  Adults 72 years old: Prevnar (PCV13) vaccine recommended followed by Pneumovax (PPSV23) vaccine  If already received PPSV23 since turning 65, then PCV13 recommended at least one year after PPSV23 dose  Hepatitis B Vaccine 3 dose series if at intermediate or high risk (ex: diabetes, end stage renal disease, liver disease)   Tetanus (Td) Vaccine - COST NOT COVERED BY MEDICARE PART B Following completion of primary series, a booster dose should be given every 10 years to maintain immunity against tetanus  Td may also be given as tetanus wound prophylaxis  Tdap Vaccine - COST NOT COVERED BY MEDICARE PART B Recommended at least once for all adults  For pregnant patients, recommended with each pregnancy     Shingles Vaccine (Shingrix) - COST NOT COVERED BY MEDICARE PART B  2 shot series recommended in those aged 48 and above     Health Maintenance Due:      Topic Date Due    Breast Cancer Screening: Mammogram  12/07/2019    Colorectal Cancer Screening  05/07/2020  Hepatitis C Screening  Completed     Immunizations Due:      Topic Date Due    COVID-19 Vaccine (1) Never done    DTaP,Tdap,and Td Vaccines (1 - Tdap) Never done    Pneumococcal Vaccine: 65+ Years (1 of 1 - PPSV23) Never done    Influenza Vaccine (1) 09/01/2021     Advance Directives   What are advance directives? Advance directives are legal documents that state your wishes and plans for medical care  These plans are made ahead of time in case you lose your ability to make decisions for yourself  Advance directives can apply to any medical decision, such as the treatments you want, and if you want to donate organs  What are the types of advance directives? There are many types of advance directives, and each state has rules about how to use them  You may choose a combination of any of the following:  · Living will: This is a written record of the treatment you want  You can also choose which treatments you do not want, which to limit, and which to stop at a certain time  This includes surgery, medicine, IV fluid, and tube feedings  · Durable power of  for healthcare [facility]): This is a written record that states who you want to make healthcare choices for you when you are unable to make them for yourself  This person, called a proxy, is usually a family member or a friend  You may choose more than 1 proxy  · Do not resuscitate (DNR) order:  A DNR order is used in case your heart stops beating or you stop breathing  It is a request not to have certain forms of treatment, such as CPR  A DNR order may be included in other types of advance directives  · Medical directive: This covers the care that you want if you are in a coma, near death, or unable to make decisions for yourself  You can list the treatments you want for each condition  Treatment may include pain medicine, surgery, blood transfusions, dialysis, IV or tube feedings, and a ventilator (breathing machine)  · Values history:   This document has questions about your views, beliefs, and how you feel and think about life  This information can help others choose the care that you would choose  Why are advance directives important? An advance directive helps you control your care  Although spoken wishes may be used, it is better to have your wishes written down  Spoken wishes can be misunderstood, or not followed  Treatments may be given even if you do not want them  An advance directive may make it easier for your family to make difficult choices about your care  Weight Management   Why it is important to manage your weight:  Being overweight increases your risk of health conditions such as heart disease, high blood pressure, type 2 diabetes, and certain types of cancer  It can also increase your risk for osteoarthritis, sleep apnea, and other respiratory problems  Aim for a slow, steady weight loss  Even a small amount of weight loss can lower your risk of health problems  How to lose weight safely:  A safe and healthy way to lose weight is to eat fewer calories and get regular exercise  You can lose up about 1 pound a week by decreasing the number of calories you eat by 500 calories each day  Healthy meal plan for weight management:  A healthy meal plan includes a variety of foods, contains fewer calories, and helps you stay healthy  A healthy meal plan includes the following:  · Eat whole-grain foods more often  A healthy meal plan should contain fiber  Fiber is the part of grains, fruits, and vegetables that is not broken down by your body  Whole-grain foods are healthy and provide extra fiber in your diet  Some examples of whole-grain foods are whole-wheat breads and pastas, oatmeal, brown rice, and bulgur  · Eat a variety of vegetables every day  Include dark, leafy greens such as spinach, kale, kaden greens, and mustard greens  Eat yellow and orange vegetables such as carrots, sweet potatoes, and winter squash     · Eat a variety of fruits every day  Choose fresh or canned fruit (canned in its own juice or light syrup) instead of juice  Fruit juice has very little or no fiber  · Eat low-fat dairy foods  Drink fat-free (skim) milk or 1% milk  Eat fat-free yogurt and low-fat cottage cheese  Try low-fat cheeses such as mozzarella and other reduced-fat cheeses  · Choose meat and other protein foods that are low in fat  Choose beans or other legumes such as split peas or lentils  Choose fish, skinless poultry (chicken or turkey), or lean cuts of red meat (beef or pork)  Before you cook meat or poultry, cut off any visible fat  · Use less fat and oil  Try baking foods instead of frying them  Add less fat, such as margarine, sour cream, regular salad dressing and mayonnaise to foods  Eat fewer high-fat foods  Some examples of high-fat foods include french fries, doughnuts, ice cream, and cakes  · Eat fewer sweets  Limit foods and drinks that are high in sugar  This includes candy, cookies, regular soda, and sweetened drinks  Exercise:  Exercise at least 30 minutes per day on most days of the week  Some examples of exercise include walking, biking, dancing, and swimming  You can also fit in more physical activity by taking the stairs instead of the elevator or parking farther away from stores  Ask your healthcare provider about the best exercise plan for you  © Copyright [employer] 2018 Information is for End User's use only and may not be sold, redistributed or otherwise used for commercial purposes  All illustrations and images included in CareNotes® are the copyrighted property of [employer]  or [facility] Preventive Visit Patient Instructions  Thank you for completing your Welcome to Medicare Visit or Medicare Annual Wellness Visit today  Your next wellness visit will be due in one year (9/8/2022)  The screening/preventive services that you may require over the next 5-10 years are detailed below   Some tests may not apply to you based off risk factors and/or age  Screening tests ordered at today's visit but not completed yet may show as past due  Also, please note that scanned in results may not display below  Preventive Screenings:  Service Recommendations Previous Testing/Comments   Colorectal Cancer Screening  * Colonoscopy    * Fecal Occult Blood Test (FOBT)/Fecal Immunochemical Test (FIT)  * Fecal DNA/Cologuard Test  * Flexible Sigmoidoscopy Age: 54-65 years old   Colonoscopy: every 10 years (may be performed more frequently if at higher risk)  OR  FOBT/FIT: every 1 year  OR  Cologuard: every 3 years  OR  Sigmoidoscopy: every 5 years  Screening may be recommended earlier than age 48 if at higher risk for colorectal cancer  Also, an individualized decision between you and your healthcare provider will decide whether screening between the ages of 74-80 would be appropriate  Colonoscopy: 05/07/2015  FOBT/FIT: Not on file  Cologuard: Not on file  Sigmoidoscopy: Not on file          Breast Cancer Screening Age: 36 years old  Frequency: every 1-2 years  Not required if history of left and right mastectomy Mammogram: 12/07/2017        Cervical Cancer Screening Between the ages of 21-29, pap smear recommended once every 3 years  Between the ages of 33-67, can perform pap smear with HPV co-testing every 5 years  Recommendations may differ for women with a history of total hysterectomy, cervical cancer, or abnormal pap smears in past  Pap Smear: Not on file        Hepatitis C Screening Once for adults born between 1945 and 1965  More frequently in patients at high risk for Hepatitis C Hep C Antibody: 08/12/2020        Diabetes Screening 1-2 times per year if you're at risk for diabetes or have pre-diabetes Fasting glucose: 57 mg/dL   A1C: No results in last 5 years        Cholesterol Screening Once every 5 years if you don't have a lipid disorder  May order more often based on risk factors   Lipid panel: 09/03/2021          Other Preventive Screenings Covered by Medicare:  6  Abdominal Aortic Aneurysm (AAA) Screening: covered once if your at risk  You're considered to be at risk if you have a family history of AAA  7  Lung Cancer Screening: covers low dose CT scan once per year if you meet all of the following conditions: (1) Age 50-69; (2) No signs or symptoms of lung cancer; (3) Current smoker or have quit smoking within the last 15 years; (4) You have a tobacco smoking history of at least 30 pack years (packs per day multiplied by number of years you smoked); (5) You get a written order from a healthcare provider  8  Glaucoma Screening: covered annually if you're considered high risk: (1) You have diabetes OR (2) Family history of glaucoma OR (3)  aged 48 and older OR (3)  American aged 72 and older  5  Osteoporosis Screening: covered every 2 years if you meet one of the following conditions: (1) You're estrogen deficient and at risk for osteoporosis based off medical history and other findings; (2) Have a vertebral abnormality; (3) On glucocorticoid therapy for more than 3 months; (4) Have primary hyperparathyroidism; (5) On osteoporosis medications and need to assess response to drug therapy  · Last bone density test (DXA Scan): Not on file  10  HIV Screening: covered annually if you're between the age of 12-76  Also covered annually if you are younger than 13 and older than 72 with risk factors for HIV infection  For pregnant patients, it is covered up to 3 times per pregnancy      Immunizations:  Immunization Recommendations   Influenza Vaccine Annual influenza vaccination during flu season is recommended for all persons aged >= 6 months who do not have contraindications   Pneumococcal Vaccine (Prevnar and Pneumovax)  * Prevnar = PCV13  * Pneumovax = PPSV23   Adults 25-60 years old: 1-3 doses may be recommended based on certain risk factors  Adults 72 years old: Prevnar (PCV13) vaccine recommended followed by Pneumovax (PPSV23) vaccine  If already received PPSV23 since turning 65, then PCV13 recommended at least one year after PPSV23 dose  Hepatitis B Vaccine 3 dose series if at intermediate or high risk (ex: diabetes, end stage renal disease, liver disease)   Tetanus (Td) Vaccine - COST NOT COVERED BY MEDICARE PART B Following completion of primary series, a booster dose should be given every 10 years to maintain immunity against tetanus  Td may also be given as tetanus wound prophylaxis  Tdap Vaccine - COST NOT COVERED BY MEDICARE PART B Recommended at least once for all adults  For pregnant patients, recommended with each pregnancy  Shingles Vaccine (Shingrix) - COST NOT COVERED BY MEDICARE PART B  2 shot series recommended in those aged 48 and above     Health Maintenance Due:      Topic Date Due    Breast Cancer Screening: Mammogram  12/07/2019    Colorectal Cancer Screening  05/07/2020    Hepatitis C Screening  Completed     Immunizations Due:      Topic Date Due    COVID-19 Vaccine (1) Never done    DTaP,Tdap,and Td Vaccines (1 - Tdap) Never done    Pneumococcal Vaccine: 65+ Years (1 of 1 - PPSV23) Never done    Influenza Vaccine (1) 09/01/2021     Advance Directives   What are advance directives? Advance directives are legal documents that state your wishes and plans for medical care  These plans are made ahead of time in case you lose your ability to make decisions for yourself  Advance directives can apply to any medical decision, such as the treatments you want, and if you want to donate organs  What are the types of advance directives? There are many types of advance directives, and each state has rules about how to use them  You may choose a combination of any of the following:  · Living will: This is a written record of the treatment you want  You can also choose which treatments you do not want, which to limit, and which to stop at a certain time   This includes surgery, medicine, IV fluid, and tube feedings  · Durable power of  for healthcare [facility]): This is a written record that states who you want to make healthcare choices for you when you are unable to make them for yourself  This person, called a proxy, is usually a family member or a friend  You may choose more than 1 proxy  · Do not resuscitate (DNR) order:  A DNR order is used in case your heart stops beating or you stop breathing  It is a request not to have certain forms of treatment, such as CPR  A DNR order may be included in other types of advance directives  · Medical directive: This covers the care that you want if you are in a coma, near death, or unable to make decisions for yourself  You can list the treatments you want for each condition  Treatment may include pain medicine, surgery, blood transfusions, dialysis, IV or tube feedings, and a ventilator (breathing machine)  · Values history: This document has questions about your views, beliefs, and how you feel and think about life  This information can help others choose the care that you would choose  Why are advance directives important? An advance directive helps you control your care  Although spoken wishes may be used, it is better to have your wishes written down  Spoken wishes can be misunderstood, or not followed  Treatments may be given even if you do not want them  An advance directive may make it easier for your family to make difficult choices about your care  Weight Management   Why it is important to manage your weight:  Being overweight increases your risk of health conditions such as heart disease, high blood pressure, type 2 diabetes, and certain types of cancer  It can also increase your risk for osteoarthritis, sleep apnea, and other respiratory problems  Aim for a slow, steady weight loss  Even a small amount of weight loss can lower your risk of health problems    How to lose weight safely:  A safe and healthy way to lose weight is to eat fewer calories and get regular exercise  You can lose up about 1 pound a week by decreasing the number of calories you eat by 500 calories each day  Healthy meal plan for weight management:  A healthy meal plan includes a variety of foods, contains fewer calories, and helps you stay healthy  A healthy meal plan includes the following:  · Eat whole-grain foods more often  A healthy meal plan should contain fiber  Fiber is the part of grains, fruits, and vegetables that is not broken down by your body  Whole-grain foods are healthy and provide extra fiber in your diet  Some examples of whole-grain foods are whole-wheat breads and pastas, oatmeal, brown rice, and bulgur  · Eat a variety of vegetables every day  Include dark, leafy greens such as spinach, kale, kaden greens, and mustard greens  Eat yellow and orange vegetables such as carrots, sweet potatoes, and winter squash  · Eat a variety of fruits every day  Choose fresh or canned fruit (canned in its own juice or light syrup) instead of juice  Fruit juice has very little or no fiber  · Eat low-fat dairy foods  Drink fat-free (skim) milk or 1% milk  Eat fat-free yogurt and low-fat cottage cheese  Try low-fat cheeses such as mozzarella and other reduced-fat cheeses  · Choose meat and other protein foods that are low in fat  Choose beans or other legumes such as split peas or lentils  Choose fish, skinless poultry (chicken or turkey), or lean cuts of red meat (beef or pork)  Before you cook meat or poultry, cut off any visible fat  · Use less fat and oil  Try baking foods instead of frying them  Add less fat, such as margarine, sour cream, regular salad dressing and mayonnaise to foods  Eat fewer high-fat foods  Some examples of high-fat foods include french fries, doughnuts, ice cream, and cakes  · Eat fewer sweets  Limit foods and drinks that are high in sugar   This includes candy, cookies, regular soda, and sweetened drinks  Exercise:  Exercise at least 30 minutes per day on most days of the week  Some examples of exercise include walking, biking, dancing, and swimming  You can also fit in more physical activity by taking the stairs instead of the elevator or parking farther away from stores  Ask your healthcare provider about the best exercise plan for you  © Copyright [employer] 2018 Information is for End User's use only and may not be sold, redistributed or otherwise used for commercial purposes  All illustrations and images included in CareNotes® are the copyrighted property of [employer]  or [employer]         Return for Annual physical     Subjective:      Patient ID: Marjorie Davalos is a 65 y o  female  Chief Complaint   Patient presents with    Results     Review labs [facility] [place]       Following up today on labs, hypothyroidism, and SVT  She has been doing well since her last visit  No complaints or concerns today  Labs done prior  Sees a PCP in [place], where she lives half of the year  No recent episode of SVT  Sees cardiology in [place] as well  Mammogram is up to date  The following portions of the patient's history were reviewed and updated as appropriate: allergies, current medications, past family history, past medical history, past social history, past surgical history and problem list     Review of Systems   Constitutional: Negative for chills, fatigue and fever  Respiratory: Negative for cough, shortness of breath and wheezing  Cardiovascular: Negative for chest pain, palpitations and leg swelling  Gastrointestinal: Negative for abdominal pain, diarrhea, nausea and vomiting  Skin: Negative for rash  Neurological: Negative for dizziness and headaches           Current Outpatient Medications   Medication Sig Dispense Refill    ALPRAZolam (XANAX) 0 25 mg tablet Take by mouth daily at bedtime as needed for anxiety      bimatoprost (LUMIGAN) 0 01 % ophthalmic drops Apply 1 drop to eye Daily      Biotin 1 MG CAPS Take 1 capsule by mouth daily      Cholecalciferol (Vitamin D3) 50 MCG (2000 UT) TABS Take 2 tablets (4,000 Units total) by mouth daily (Patient taking differently: Take 2,000 Units by mouth daily 5000 daily) 90 tablet 3    ibuprofen (MOTRIN) 200 mg tablet Take 2 tablets (400 mg total) by mouth every 6 (six) hours as needed for mild pain 24 tablet 0    levothyroxine 75 mcg tablet Take 1 tablet (75 mcg total) by mouth daily (Patient taking differently: Take 55 mcg by mouth daily ) 30 tablet 0    Multiple Vitamin (multivitamin) tablet Take 1 tablet by mouth daily      bisoprolol (ZEBETA) 10 MG tablet Take 2 5 tablets (25 mg total) by mouth daily as needed (SVT) 30 tablet 0     No current facility-administered medications for this visit  Objective:    /72   Pulse 60   Temp 98 °F (36 7 °C)   Ht 5' 3" (1 6 m)   Wt 71 7 kg (158 lb)   LMP 01/20/2000 (Approximate)   BMI 27 99 kg/m²        Physical Exam  Vitals and nursing note reviewed  Constitutional:       Appearance: Normal appearance  She is well-developed  HENT:      Head: Normocephalic and atraumatic  Right Ear: Tympanic membrane, ear canal and external ear normal       Left Ear: Tympanic membrane, ear canal and external ear normal       Nose: No mucosal edema or rhinorrhea  Mouth/Throat:      Pharynx: Uvula midline  Eyes:      Conjunctiva/sclera: Conjunctivae normal    Neck:      Thyroid: No thyromegaly  Cardiovascular:      Rate and Rhythm: Normal rate and regular rhythm  Heart sounds: Normal heart sounds  No murmur heard  Pulmonary:      Effort: Pulmonary effort is normal       Breath sounds: Normal breath sounds  Abdominal:      General: Bowel sounds are normal  There is no distension  Palpations: There is no hepatomegaly or splenomegaly  Tenderness: There is no abdominal tenderness  Musculoskeletal:      Cervical back: Neck supple   No edema    Lymphadenopathy:      Cervical:      Right cervical: No superficial cervical adenopathy  Left cervical: No superficial cervical adenopathy  Skin:     General: Skin is warm and dry  Findings: No rash  Neurological:      Mental Status: She is alert  Sensory: No sensory deficit        Coordination: Coordination normal       Deep Tendon Reflexes: Reflexes normal    Psychiatric:         Mood and Affect: Mood normal          Behavior: Behavior normal                 PANCHO Patel

## 2021-09-08 ENCOUNTER — TELEPHONE (OUTPATIENT)
Dept: ADMINISTRATIVE | Facility: OTHER | Age: 71
End: 2021-09-08

## 2021-09-08 NOTE — TELEPHONE ENCOUNTER
----- Message from [address] sent at 9/7/2021  1:11 PM EDT -----  09/07/21 1:12 PM    Hello, our patient Leandro Garcia has had Mammogram completed/performed  Please assist in updating the patient chart by making an External outreach to [facility] facility located in [facility]  The date of service is 2021  There is a scanned copy of a mammogram from 2017 in pt's chart, same facility      Thank you,  PANCHO Ac  [facility]

## 2021-10-05 ENCOUNTER — OFFICE VISIT (OUTPATIENT)
Dept: FAMILY MEDICINE CLINIC | Facility: CLINIC | Age: 71
End: 2021-10-05
Payer: COMMERCIAL

## 2021-10-05 VITALS
SYSTOLIC BLOOD PRESSURE: 100 MMHG | HEART RATE: 60 BPM | DIASTOLIC BLOOD PRESSURE: 62 MMHG | BODY MASS INDEX: 27.82 KG/M2 | HEIGHT: 63 IN | RESPIRATION RATE: 20 BRPM | WEIGHT: 157 LBS | TEMPERATURE: 98.5 F

## 2021-10-05 DIAGNOSIS — R42 VERTIGO: Primary | ICD-10-CM

## 2021-10-05 PROCEDURE — 99213 OFFICE O/P EST LOW 20 MIN: CPT | Performed by: NURSE PRACTITIONER

## 2021-10-05 PROCEDURE — 3008F BODY MASS INDEX DOCD: CPT | Performed by: NURSE PRACTITIONER

## 2021-10-05 PROCEDURE — 1160F RVW MEDS BY RX/DR IN RCRD: CPT | Performed by: NURSE PRACTITIONER

## 2021-10-05 PROCEDURE — 1036F TOBACCO NON-USER: CPT | Performed by: NURSE PRACTITIONER

## 2021-10-05 RX ORDER — MECLIZINE HYDROCHLORIDE 25 MG/1
25 TABLET ORAL 3 TIMES DAILY PRN
Qty: 30 TABLET | Refills: 0 | Status: SHIPPED | OUTPATIENT
Start: 2021-10-05

## 2021-11-18 ENCOUNTER — TELEPHONE (OUTPATIENT)
Dept: FAMILY MEDICINE CLINIC | Facility: CLINIC | Age: 71
End: 2021-11-18

## 2022-02-07 DIAGNOSIS — Z12.31 ENCOUNTER FOR SCREENING MAMMOGRAM FOR MALIGNANT NEOPLASM OF BREAST: Primary | ICD-10-CM

## 2022-05-10 ENCOUNTER — OFFICE VISIT (OUTPATIENT)
Dept: PODIATRY | Facility: CLINIC | Age: 72
End: 2022-05-10
Payer: COMMERCIAL

## 2022-05-10 VITALS — WEIGHT: 157 LBS | BODY MASS INDEX: 27.82 KG/M2 | RESPIRATION RATE: 16 BRPM | HEIGHT: 63 IN

## 2022-05-10 DIAGNOSIS — M21.961 ACQUIRED DEFORMITY OF RIGHT FOOT: Primary | ICD-10-CM

## 2022-05-10 DIAGNOSIS — M67.471 GANGLION CYST OF RIGHT FOOT: ICD-10-CM

## 2022-05-10 DIAGNOSIS — M25.571 ARTHRALGIA OF RIGHT FOOT: ICD-10-CM

## 2022-05-10 PROCEDURE — 73620 X-RAY EXAM OF FOOT: CPT | Performed by: PODIATRIST

## 2022-05-10 PROCEDURE — 99202 OFFICE O/P NEW SF 15 MIN: CPT | Performed by: PODIATRIST

## 2022-05-10 NOTE — PROGRESS NOTES
Assessment/Plan:  Pain right foot  Acquired deformity foot  Arthralgia right foot osteoarthritis right instep  Plan  Foot exam performed  Patient educated on condition  X-rays taken reviewed  Patient will try Voltaren gel prior to arthrocentesis  Diagnoses and all orders for this visit:    Acquired deformity of right foot    Arthralgia of right foot    Ganglion cyst of right foot          Subjective:  Patient is concerned about a lump on the top of her right instep  It has been there for quite some time  No history of trauma  It hurts on occasion      No Known Allergies      Current Outpatient Medications:     ALPRAZolam (XANAX) 0 25 mg tablet, Take by mouth daily at bedtime as needed for anxiety, Disp: , Rfl:     bimatoprost (LUMIGAN) 0 01 % ophthalmic drops, Apply 1 drop to eye Daily, Disp: , Rfl:     Biotin 1 MG CAPS, Take 1 capsule by mouth daily, Disp: , Rfl:     bisoprolol (ZEBETA) 10 MG tablet, Take 2 5 tablets (25 mg total) by mouth daily as needed (SVT), Disp: 30 tablet, Rfl: 0    Cholecalciferol (Vitamin D3) 50 MCG (2000 UT) TABS, Take 2 tablets (4,000 Units total) by mouth daily (Patient taking differently: Take 2,000 Units by mouth daily 5000 daily), Disp: 90 tablet, Rfl: 3    ibuprofen (MOTRIN) 200 mg tablet, Take 2 tablets (400 mg total) by mouth every 6 (six) hours as needed for mild pain, Disp: 24 tablet, Rfl: 0    levothyroxine 75 mcg tablet, Take 1 tablet (75 mcg total) by mouth daily, Disp: 30 tablet, Rfl: 0    meclizine (ANTIVERT) 25 mg tablet, Take 1 tablet (25 mg total) by mouth 3 (three) times a day as needed for dizziness, Disp: 30 tablet, Rfl: 0    Multiple Vitamin (multivitamin) tablet, Take 1 tablet by mouth daily, Disp: , Rfl:     Patient Active Problem List   Diagnosis    Hypothyroidism    Greater trochanteric bursitis of left hip    SVT (supraventricular tachycardia) (HCC)    Primary insomnia    Vitamin D deficiency    Other neutropenia (HCC) Patient ID: Briseyda Mcarthur is a 66 y o  female  HPI    The following portions of the patient's history were reviewed and updated as appropriate:     family history includes Arthritis in her father and mother; Cancer in her sister  reports that she has never smoked  She has never used smokeless tobacco  She reports that she does not drink alcohol and does not use drugs  Vitals:    05/10/22 1455   Resp: 16       Review of Systems      Objective:  Patient's shoes and socks removed  Foot ExamPhysical Exam  Vitals and nursing note reviewed  Constitutional:       Appearance: Normal appearance  Cardiovascular:      Rate and Rhythm: Normal rate and regular rhythm  Musculoskeletal:      Comments: Patient demonstrates pes cavus type foot  Right greater than left instep demonstrates dorsal spurring consistent with exostosis formation of osteoarthritis  Small ganglion cyst palpated on top of the right foot  X-ray demonstrates dorsal bossing  Skin:     Capillary Refill: Capillary refill takes less than 2 seconds  Neurological:      Mental Status: She is alert  Psychiatric:         Mood and Affect: Mood normal          Behavior: Behavior normal          Thought Content:  Thought content normal          Judgment: Judgment normal

## 2022-06-03 ENCOUNTER — OFFICE VISIT (OUTPATIENT)
Dept: URGENT CARE | Facility: CLINIC | Age: 72
End: 2022-06-03
Payer: COMMERCIAL

## 2022-06-03 ENCOUNTER — APPOINTMENT (OUTPATIENT)
Dept: RADIOLOGY | Facility: CLINIC | Age: 72
End: 2022-06-03
Payer: COMMERCIAL

## 2022-06-03 VITALS
OXYGEN SATURATION: 99 % | BODY MASS INDEX: 26.8 KG/M2 | DIASTOLIC BLOOD PRESSURE: 79 MMHG | HEART RATE: 64 BPM | TEMPERATURE: 98.5 F | SYSTOLIC BLOOD PRESSURE: 115 MMHG | RESPIRATION RATE: 16 BRPM | WEIGHT: 157 LBS | HEIGHT: 64 IN

## 2022-06-03 DIAGNOSIS — S62.501A CLOSED AVULSION FRACTURE OF PHALANX OF RIGHT THUMB, INITIAL ENCOUNTER: ICD-10-CM

## 2022-06-03 DIAGNOSIS — S42.292A OTHER CLOSED DISPLACED FRACTURE OF PROXIMAL END OF LEFT HUMERUS, INITIAL ENCOUNTER: Primary | ICD-10-CM
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
## 2022-06-03 PROCEDURE — 99214 OFFICE O/P EST MOD 30 MIN: CPT | Performed by: PHYSICIAN ASSISTANT

## 2022-06-03 PROCEDURE — 73030 X-RAY EXAM OF SHOULDER: CPT

## 2022-06-03 PROCEDURE — 73130 X-RAY EXAM OF HAND: CPT

## 2022-06-03 RX ORDER — BISOPROLOL FUMARATE 5 MG/1
TABLET ORAL
COMMUNITY
Start: 2022-03-22 | End: 2022-08-10

## 2022-06-03 RX ORDER — METHYLPREDNISOLONE 4 MG/1
TABLET ORAL
Qty: 21 TABLET | Refills: 0 | Status: SHIPPED | OUTPATIENT
Start: 2022-06-03 | End: 2022-08-10

## 2022-06-03 RX ORDER — EFINACONAZOLE 100 MG/ML
SOLUTION TOPICAL
COMMUNITY
Start: 2022-05-07

## 2022-06-03 NOTE — PATIENT INSTRUCTIONS
Proximal humerus fracture, possible avulsion fracture of right thumb  To wear provided sling and follow-up with orthopedic for further evaluation and management  To take Medrol Dosepak as instructed  Return or be seen in ER with any progressing or worsening symptoms

## 2022-06-03 NOTE — PROGRESS NOTES
[facility]        NAME: Olga Delong is a 66 y o  female  : 1950    MRN: [id number]  DATE: Cary 3, 2022  TIME: 10:23 AM    Assessment and Plan   Other closed displaced fracture of proximal end of left humerus, initial encounter [S42 292A]  1  Other closed displaced fracture of proximal end of left humerus, initial encounter  XR shoulder 2+ vw left    XR hand 3+ vw right    methylPREDNISolone 4 MG tablet therapy pack    Ambulatory referral to Orthopedic Surgery    CANCELED: Ambulatory referral to Orthopedic Surgery   2  Closed avulsion fracture of phalanx of right thumb, initial encounter  methylPREDNISolone 4 MG tablet therapy pack    Ambulatory referral to Orthopedic Surgery    CANCELED: Ambulatory referral to Orthopedic Surgery         Patient Instructions     Patient Instructions   Proximal humerus fracture, possible avulsion fracture of right thumb  To wear provided sling and follow-up with orthopedic for further evaluation and management  To take Medrol Dosepak as instructed  Return or be seen in ER with any progressing or worsening symptoms  Follow up with PCP in 3-5 days  Proceed to  ER if symptoms worsen  Chief Complaint     Chief Complaint   Patient presents with    Shoulder Injury     Left shoulder injury and right hand injury due to a fall          History of Present Illness       Patient is a 78-year-old female presenting today with left shoulder pain and right hand pain x1 day  Patient notes that while walking her dog yesterday that she lost her balance falling down to the ground hitting her left shoulder in the grass and catching herself with her right hand, states that she immediately felt some pain and discomfort of her left shoulder and right thumb, ice the area which provided some relief of her discomfort, notes some bruising of her left biceps and feels like her left arm is weak, notes pain and discomfort with abduction and flexion of her left arm    Denies numbness, tingling, LOC, lightheadedness, dizziness, nausea  Review of Systems   Review of Systems   Constitutional: Negative for chills and fever  HENT: Negative for ear pain and sore throat  Eyes: Negative for pain and visual disturbance  Respiratory: Negative for cough and shortness of breath  Cardiovascular: Negative for chest pain and palpitations  Gastrointestinal: Negative for abdominal pain and vomiting  Genitourinary: Negative for dysuria and hematuria  Musculoskeletal:        See HPI   Skin: Negative for color change and rash  Neurological: Negative for seizures and syncope  All other systems reviewed and are negative          Current Medications       Current Outpatient Medications:     methylPREDNISolone 4 MG tablet therapy pack, Use as directed on package, Disp: 21 tablet, Rfl: 0    ALPRAZolam (XANAX) 0 25 mg tablet, Take by mouth daily at bedtime as needed for anxiety, Disp: , Rfl:     bimatoprost (LUMIGAN) 0 01 % ophthalmic drops, Apply 1 drop to eye Daily, Disp: , Rfl:     Biotin 1 MG CAPS, Take 1 capsule by mouth daily, Disp: , Rfl:     bisoprolol (ZEBETA) 10 MG tablet, Take 2 5 tablets (25 mg total) by mouth daily as needed (SVT), Disp: 30 tablet, Rfl: 0    bisoprolol (ZEBETA) 5 mg tablet, , Disp: , Rfl:     Cholecalciferol (Vitamin D3) 50 MCG (2000 UT) TABS, Take 2 tablets (4,000 Units total) by mouth daily (Patient taking differently: Take 2,000 Units by mouth daily 5000 daily), Disp: 90 tablet, Rfl: 3    ibuprofen (MOTRIN) 200 mg tablet, Take 2 tablets (400 mg total) by mouth every 6 (six) hours as needed for mild pain, Disp: 24 tablet, Rfl: 0    Jublia 10 % SOLN, , Disp: , Rfl:     levothyroxine 75 mcg tablet, Take 1 tablet (75 mcg total) by mouth daily, Disp: 30 tablet, Rfl: 0    meclizine (ANTIVERT) 25 mg tablet, Take 1 tablet (25 mg total) by mouth 3 (three) times a day as needed for dizziness, Disp: 30 tablet, Rfl: 0    Multiple Vitamin (multivitamin) tablet, Take 1 tablet by mouth daily, Disp: , Rfl:     Current Allergies     Allergies as of 2022    (No Known Allergies)            The following portions of the patient's history were reviewed and updated as appropriate: allergies, current medications, past family history, past medical history, past social history, past surgical history and problem list      Past Medical History:   Diagnosis Date    Anxiety     Arthritis     Disease of thyroid gland     Glaucoma     bilateral    Osteoporosis     SVT (supraventricular tachycardia) ([address] )     Vitamin D deficiency        Past Surgical History:   Procedure Laterality Date    BREAST LUMPECTOMY      CARDIAC ELECTROPHYSIOLOGY STUDY AND ABLATION       SECTION         Family History   Problem Relation Age of Onset    Arthritis Mother     Arthritis Father     Cancer Sister          Medications have been verified  Objective   /79   Pulse 64   Temp 98 5 °F (36 9 °C)   Resp 16   Ht 5' 4" (1 626 m)   Wt 71 2 kg (157 lb)   LMP 2000 (Approximate)   SpO2 99%   BMI 26 95 kg/m²        Physical Exam     Physical Exam  Vitals and nursing note reviewed  Constitutional:       General: She is not in acute distress  Appearance: Normal appearance  She is not toxic-appearing  HENT:      Head: Normocephalic and atraumatic  Right Ear: Tympanic membrane, ear canal and external ear normal       Left Ear: Tympanic membrane, ear canal and external ear normal       Nose: Nose normal       Mouth/Throat:      Mouth: Mucous membranes are moist       Pharynx: Oropharynx is clear  Eyes:      Extraocular Movements: Extraocular movements intact  Conjunctiva/sclera: Conjunctivae normal       Pupils: Pupils are equal, round, and reactive to light  Cardiovascular:      Rate and Rhythm: Normal rate and regular rhythm  Pulses: Normal pulses  Heart sounds: Normal heart sounds     Pulmonary:      Effort: Pulmonary effort is normal       Breath sounds: Normal breath sounds  Musculoskeletal:      Comments: No obvious deformity of left shoulder, ecchymosis present of medial aspect of left biceps, diffuse tenderness upon palpation of proximal aspect of left shoulder, decreased ROM secondary to discomfort, neurovascularly intact, 2+ distal radial pulse of affected arm, less than 2sec capillary refill of affected hand  No obvious deformity of right hand, no bruising, no swelling, mild TTP of MCP of right thumb, full ROM with mild discomfort upon opposition of right thumb, normal strength of right thumb, gross sensation intact  Skin:     General: Skin is warm  Capillary Refill: Capillary refill takes less than 2 seconds  Neurological:      General: No focal deficit present  Mental Status: She is alert and oriented to person, place, and time

## 2022-06-08 ENCOUNTER — APPOINTMENT (OUTPATIENT)
Dept: RADIOLOGY | Facility: CLINIC | Age: 72
End: 2022-06-08
Payer: COMMERCIAL

## 2022-06-08 ENCOUNTER — OFFICE VISIT (OUTPATIENT)
Dept: OBGYN CLINIC | Facility: CLINIC | Age: 72
End: 2022-06-08
Payer: COMMERCIAL

## 2022-06-08 VITALS
SYSTOLIC BLOOD PRESSURE: 133 MMHG | HEIGHT: 64 IN | WEIGHT: 157 LBS | DIASTOLIC BLOOD PRESSURE: 78 MMHG | HEART RATE: 51 BPM | BODY MASS INDEX: 26.8 KG/M2

## 2022-06-08 DIAGNOSIS — S42.292A OTHER CLOSED DISPLACED FRACTURE OF PROXIMAL END OF LEFT HUMERUS, INITIAL ENCOUNTER: ICD-10-CM

## 2022-06-08 DIAGNOSIS — S42.292A OTHER CLOSED DISPLACED FRACTURE OF PROXIMAL END OF LEFT HUMERUS, INITIAL ENCOUNTER: Primary | ICD-10-CM

## 2022-06-08 PROCEDURE — 73030 X-RAY EXAM OF SHOULDER: CPT

## 2022-06-08 PROCEDURE — 1036F TOBACCO NON-USER: CPT | Performed by: PHYSICIAN ASSISTANT

## 2022-06-08 PROCEDURE — 1160F RVW MEDS BY RX/DR IN RCRD: CPT | Performed by: PHYSICIAN ASSISTANT

## 2022-06-08 PROCEDURE — 99204 OFFICE O/P NEW MOD 45 MIN: CPT | Performed by: PHYSICIAN ASSISTANT

## 2022-06-08 NOTE — PROGRESS NOTES
Assessment/Plan:  1  Other closed displaced fracture of proximal end of left humerus, initial encounter  Ambulatory referral to Orthopedic Surgery    XR shoulder 2+ vw left       The patient does have a nondisplaced fracture of the left greater tuberosity  This should heal well with nonoperative treatment  She is now almost a week out from her injury with unchanged alignment on x-rays today  She was placed in a more comfortable sling today  She will wear this at all times except for showering and dressing  I did advise her to stop her Medrol Dosepak as this can certainly inhibit bone healing  She may take anti-inflammatories and Tylenol over-the-counter for discomfort  I also advised ice  She will follow up in 3 weeks with repeat x-rays at that time  We hope to transition her out of her sling and start physical therapy after the next appointment  I did advise her to try to remove her rings on the left hand when she gets home  Currently they are not tight at all, however I explained that her hand could swell as time goes on  She acknowledged that she would do this  Subjective:   Maryanne Decker is a 66 y o  female who presents today for evaluation of her left shoulder  She sustained a fall onto the shoulder on 6/2/2022 when she tripped on a corn stalk  She did got to urgent care the following day and had xrays which showed a nondisplaced greater tuberosity fracture  I am able to view these images today  She has been in a sling since that time  The urgent care physician assistant did also place her on a Medrol Dosepak  She notes ongoing pain about the left shoulder as well as ecchymosis beginning down the upper arm  She notes good sensation of the left upper extremity  Review of Systems   Constitutional: Negative  Negative for chills and fever  HENT: Negative  Negative for ear pain and sore throat  Eyes: Negative  Negative for pain and redness  Respiratory: Negative    Negative for shortness of breath and wheezing  Cardiovascular: Negative for chest pain and palpitations  Gastrointestinal: Negative  Negative for abdominal pain and blood in stool  Endocrine: Negative  Negative for polydipsia and polyuria  Genitourinary: Negative  Negative for difficulty urinating and dysuria  Musculoskeletal:        As noted in HPI   Skin: Negative  Negative for pallor and rash  Neurological: Negative  Negative for dizziness and numbness  Hematological: Negative  Negative for adenopathy  Does not bruise/bleed easily  Psychiatric/Behavioral: Negative  Negative for confusion and suicidal ideas           Past Medical History:   Diagnosis Date    Anxiety     Arthritis     Disease of thyroid gland     Glaucoma     bilateral    Osteoporosis     SVT (supraventricular tachycardia) (HCC)     Vitamin D deficiency        Past Surgical History:   Procedure Laterality Date    BREAST LUMPECTOMY      CARDIAC ELECTROPHYSIOLOGY STUDY AND ABLATION       SECTION         Family History   Problem Relation Age of Onset    Arthritis Mother     Arthritis Father     Cancer Sister        Social History     Occupational History    Not on file   Tobacco Use    Smoking status: Never Smoker    Smokeless tobacco: Never Used   Substance and Sexual Activity    Alcohol use: No    Drug use: No    Sexual activity: Not on file         Current Outpatient Medications:     bimatoprost (LUMIGAN) 0 01 % ophthalmic drops, Apply 1 drop to eye Daily, Disp: , Rfl:     Cholecalciferol (Vitamin D3) 50 MCG (2000 UT) TABS, Take 2 tablets (4,000 Units total) by mouth daily (Patient taking differently: Take 2,000 Units by mouth daily 5000 daily), Disp: 90 tablet, Rfl: 3    ibuprofen (MOTRIN) 200 mg tablet, Take 2 tablets (400 mg total) by mouth every 6 (six) hours as needed for mild pain, Disp: 24 tablet, Rfl: 0    Jublia 10 % SOLN, , Disp: , Rfl:     levothyroxine 75 mcg tablet, Take 1 tablet (75 mcg total) by mouth daily, Disp: 30 tablet, Rfl: 0    meclizine (ANTIVERT) 25 mg tablet, Take 1 tablet (25 mg total) by mouth 3 (three) times a day as needed for dizziness, Disp: 30 tablet, Rfl: 0    methylPREDNISolone 4 MG tablet therapy pack, Use as directed on package, Disp: 21 tablet, Rfl: 0    Multiple Vitamin (multivitamin) tablet, Take 1 tablet by mouth daily, Disp: , Rfl:     ALPRAZolam (XANAX) 0 25 mg tablet, Take by mouth daily at bedtime as needed for anxiety (Patient not taking: Reported on 6/8/2022), Disp: , Rfl:     Biotin 1 MG CAPS, Take 1 capsule by mouth daily (Patient not taking: Reported on 6/8/2022), Disp: , Rfl:     bisoprolol (ZEBETA) 10 MG tablet, Take 2 5 tablets (25 mg total) by mouth daily as needed (SVT) (Patient not taking: Reported on 6/8/2022), Disp: 30 tablet, Rfl: 0    bisoprolol (ZEBETA) 5 mg tablet, , Disp: , Rfl:     No Known Allergies    Objective:  Vitals:    06/08/22 1059   BP: 133/78   Pulse: (!) 51       Ortho Exam    Physical Exam  Constitutional:       General: She is not in acute distress  Appearance: She is well-developed  HENT:      Head: Normocephalic and atraumatic  Eyes:      General: No scleral icterus  Conjunctiva/sclera: Conjunctivae normal    Neck:      Vascular: No JVD  Cardiovascular:      Rate and Rhythm: Normal rate  Pulmonary:      Effort: Pulmonary effort is normal  No respiratory distress  Skin:     General: Skin is warm  Neurological:      Mental Status: She is alert and oriented to person, place, and time  Coordination: Coordination normal      Left shoulder:  Mild swelling with ecchymosis extending down the upper arm  Sensation intact left upper extremity  Patient moves all fingers freely  2+ radial pulse  Shoulder range of motion and strength testing deferred      I have personally reviewed pertinent films in PACS and my interpretation is as follows:  X-rays left shoulder from today:  Stable, nondisplaced greater tuberosity fracture

## 2022-06-08 NOTE — PROGRESS NOTES
Assessment/Plan:  1  Other closed displaced fracture of proximal end of left humerus, initial encounter  Ambulatory referral to Orthopedic Surgery   2  Closed avulsion fracture of phalanx of right thumb, initial encounter  Ambulatory referral to Orthopedic Surgery      tripped corn stalk  Xray next day  Greatertub fx  Review of Systems   Constitutional: Negative  Negative for chills and fever  HENT: Negative  Negative for ear pain and sore throat  Eyes: Negative  Negative for pain and redness  Respiratory: Negative  Negative for shortness of breath and wheezing  Cardiovascular: Negative for chest pain and palpitations  Gastrointestinal: Negative  Negative for abdominal pain and blood in stool  Endocrine: Negative  Negative for polydipsia and polyuria  Genitourinary: Negative  Negative for difficulty urinating and dysuria  Musculoskeletal:        As noted in HPI   Skin: Negative  Negative for pallor and rash  Neurological: Negative  Negative for dizziness and numbness  Hematological: Negative  Negative for adenopathy  Does not bruise/bleed easily  Psychiatric/Behavioral: Negative  Negative for confusion and suicidal ideas           Past Medical History:   Diagnosis Date    Anxiety     Arthritis     Disease of thyroid gland     Glaucoma     bilateral    Osteoporosis     SVT (supraventricular tachycardia) (HCC)     Vitamin D deficiency        Past Surgical History:   Procedure Laterality Date    BREAST LUMPECTOMY      CARDIAC ELECTROPHYSIOLOGY STUDY AND ABLATION       SECTION         Family History   Problem Relation Age of Onset    Arthritis Mother     Arthritis Father     Cancer Sister        Social History     Occupational History    Not on file   Tobacco Use    Smoking status: Never Smoker    Smokeless tobacco: Never Used   Substance and Sexual Activity    Alcohol use: No    Drug use: No    Sexual activity: Not on file         Current Outpatient Medications:     ALPRAZolam (XANAX) 0 25 mg tablet, Take by mouth daily at bedtime as needed for anxiety, Disp: , Rfl:     bimatoprost (LUMIGAN) 0 01 % ophthalmic drops, Apply 1 drop to eye Daily, Disp: , Rfl:     Biotin 1 MG CAPS, Take 1 capsule by mouth daily, Disp: , Rfl:     bisoprolol (ZEBETA) 10 MG tablet, Take 2 5 tablets (25 mg total) by mouth daily as needed (SVT), Disp: 30 tablet, Rfl: 0    bisoprolol (ZEBETA) 5 mg tablet, , Disp: , Rfl:     Cholecalciferol (Vitamin D3) 50 MCG (2000 UT) TABS, Take 2 tablets (4,000 Units total) by mouth daily (Patient taking differently: Take 2,000 Units by mouth daily 5000 daily), Disp: 90 tablet, Rfl: 3    ibuprofen (MOTRIN) 200 mg tablet, Take 2 tablets (400 mg total) by mouth every 6 (six) hours as needed for mild pain, Disp: 24 tablet, Rfl: 0    Jublia 10 % SOLN, , Disp: , Rfl:     levothyroxine 75 mcg tablet, Take 1 tablet (75 mcg total) by mouth daily, Disp: 30 tablet, Rfl: 0    meclizine (ANTIVERT) 25 mg tablet, Take 1 tablet (25 mg total) by mouth 3 (three) times a day as needed for dizziness, Disp: 30 tablet, Rfl: 0    methylPREDNISolone 4 MG tablet therapy pack, Use as directed on package, Disp: 21 tablet, Rfl: 0    Multiple Vitamin (multivitamin) tablet, Take 1 tablet by mouth daily, Disp: , Rfl:     No Known Allergies    Objective: There were no vitals filed for this visit  Ortho Exam    Physical Exam  Constitutional:       General: She is not in acute distress  Appearance: She is well-developed  HENT:      Head: Normocephalic and atraumatic  Eyes:      General: No scleral icterus  Conjunctiva/sclera: Conjunctivae normal    Neck:      Vascular: No JVD  Cardiovascular:      Rate and Rhythm: Normal rate  Pulmonary:      Effort: Pulmonary effort is normal  No respiratory distress  Skin:     General: Skin is warm  Neurological:      Mental Status: She is alert and oriented to person, place, and time        Coordination: Coordination normal          I have personally reviewed pertinent films in PACS and my interpretation is as follows:  Xrays left shoulder: Greater tuberosity fracture

## 2022-06-09 ENCOUNTER — TELEPHONE (OUTPATIENT)
Dept: OBGYN CLINIC | Facility: CLINIC | Age: 72
End: 2022-06-09

## 2022-06-09 NOTE — TELEPHONE ENCOUNTER
Patient called to reach out for Cinthya Sheridan from [facility] about a sling she was fitted with, it is causing her pain and he told her to call him with any questions  I will send an email to [facility]

## 2022-06-10 ENCOUNTER — TELEPHONE (OUTPATIENT)
Dept: OBGYN CLINIC | Facility: CLINIC | Age: 72
End: 2022-06-10

## 2022-06-10 NOTE — TELEPHONE ENCOUNTER
Patient called asking if she can wear the sling she was given at Urgent Care instead of the sling we gave her  States the one we gave her is too cumbersome, heavy, and hurting her neck  Please advise

## 2022-06-16 ENCOUNTER — TELEPHONE (OUTPATIENT)
Dept: OBGYN CLINIC | Facility: CLINIC | Age: 72
End: 2022-06-16

## 2022-06-16 NOTE — TELEPHONE ENCOUNTER
Patient called back with new [employer] Numbers ID# [id number] and Group# [id number] this has been entered and verified for her

## 2022-06-29 ENCOUNTER — OFFICE VISIT (OUTPATIENT)
Dept: OBGYN CLINIC | Facility: CLINIC | Age: 72
End: 2022-06-29
Payer: COMMERCIAL

## 2022-06-29 ENCOUNTER — APPOINTMENT (OUTPATIENT)
Dept: RADIOLOGY | Facility: CLINIC | Age: 72
End: 2022-06-29
Payer: COMMERCIAL

## 2022-06-29 VITALS — RESPIRATION RATE: 19 BRPM | BODY MASS INDEX: 26.8 KG/M2 | WEIGHT: 157 LBS | HEIGHT: 64 IN

## 2022-06-29 DIAGNOSIS — S42.295D OTHER CLOSED NONDISPLACED FRACTURE OF PROXIMAL END OF LEFT HUMERUS WITH ROUTINE HEALING, SUBSEQUENT ENCOUNTER: Primary | ICD-10-CM

## 2022-06-29 DIAGNOSIS — S42.292A OTHER CLOSED DISPLACED FRACTURE OF PROXIMAL END OF LEFT HUMERUS, INITIAL ENCOUNTER: ICD-10-CM

## 2022-06-29 PROCEDURE — 73030 X-RAY EXAM OF SHOULDER: CPT

## 2022-06-29 PROCEDURE — 99213 OFFICE O/P EST LOW 20 MIN: CPT | Performed by: PHYSICIAN ASSISTANT

## 2022-06-29 PROCEDURE — 3008F BODY MASS INDEX DOCD: CPT | Performed by: PHYSICIAN ASSISTANT

## 2022-06-29 NOTE — PROGRESS NOTES
Assessment/Plan:  1  Other closed nondisplaced fracture of proximal end of left humerus with routine healing, subsequent encounter  XR shoulder 2+ vw left    Ambulatory Referral to Physical Therapy       The patient is doing well and will wean from her sling at this point and start physical therapy for gentle range of motion  She can continue over-the-counter medications as needed for discomfort  She should still avoid any significant activity or lifting with this arm  She will follow up in 4 weeks with repeat x-rays at that time  Subjective:   Issa Pantoja is a 66 y o  female who presents today for follow-up of her left shoulder, now about a month status post closed treatment of proximal humerus fracture  Patient is doing well and has been compliant with her sling  She notes her pain is improving  She notes good sensation of the left upper extremity  Review of Systems   Constitutional: Negative  Negative for chills and fever  HENT: Negative  Negative for ear pain and sore throat  Eyes: Negative  Negative for pain and redness  Respiratory: Negative  Negative for shortness of breath and wheezing  Cardiovascular: Negative for chest pain and palpitations  Gastrointestinal: Negative  Negative for abdominal pain and blood in stool  Endocrine: Negative  Negative for polydipsia and polyuria  Genitourinary: Negative  Negative for difficulty urinating and dysuria  Musculoskeletal:        As noted in HPI   Skin: Negative  Negative for pallor and rash  Neurological: Negative  Negative for dizziness and numbness  Hematological: Negative  Negative for adenopathy  Does not bruise/bleed easily  Psychiatric/Behavioral: Negative  Negative for confusion and suicidal ideas           Past Medical History:   Diagnosis Date    Anxiety     Arthritis     Disease of thyroid gland     Glaucoma     bilateral    Osteoporosis     [facility]     Vitamin D deficiency        Past Surgical History:   Procedure Laterality Date    BREAST LUMPECTOMY      CARDIAC ELECTROPHYSIOLOGY STUDY AND ABLATION       SECTION         Family History   Problem Relation Age of Onset    Arthritis Mother     Arthritis Father     Cancer Sister        Social History     Occupational History    Not on file   Tobacco Use    Smoking status: Never Smoker    Smokeless tobacco: Never Used   Substance and Sexual Activity    Alcohol use: No    Drug use: No    Sexual activity: Not on file         Current Outpatient Medications:     bimatoprost (LUMIGAN) 0 01 % ophthalmic drops, Apply 1 drop to eye Daily, Disp: , Rfl:     Jublia 10 % SOLN, , Disp: , Rfl:     levothyroxine 75 mcg tablet, Take 1 tablet (75 mcg total) by mouth daily, Disp: 30 tablet, Rfl: 0    Multiple Vitamin (multivitamin) tablet, Take 1 tablet by mouth daily, Disp: , Rfl:     ALPRAZolam (XANAX) 0 25 mg tablet, Take by mouth daily at bedtime as needed for anxiety (Patient not taking: Reported on 2022), Disp: , Rfl:     Biotin 1 MG CAPS, Take 1 capsule by mouth daily (Patient not taking: Reported on 2022), Disp: , Rfl:     bisoprolol (ZEBETA) 10 MG tablet, Take 2 5 tablets (25 mg total) by mouth daily as needed (SVT) (Patient not taking: Reported on 2022), Disp: 30 tablet, Rfl: 0    bisoprolol (ZEBETA) 5 mg tablet, , Disp: , Rfl:     Cholecalciferol (Vitamin D3) 50 MCG (2000 UT) TABS, Take 2 tablets (4,000 Units total) by mouth daily (Patient taking differently: Take 2,000 Units by mouth daily 5000 daily), Disp: 90 tablet, Rfl: 3    ibuprofen (MOTRIN) 200 mg tablet, Take 2 tablets (400 mg total) by mouth every 6 (six) hours as needed for mild pain (Patient not taking: Reported on 2022), Disp: 24 tablet, Rfl: 0    meclizine (ANTIVERT) 25 mg tablet, Take 1 tablet (25 mg total) by mouth 3 (three) times a day as needed for dizziness (Patient not taking: Reported on 2022), Disp: 30 tablet, Rfl: 0    methylPREDNISolone 4 MG tablet therapy pack, Use as directed on package (Patient not taking: Reported on 6/29/2022), Disp: 21 tablet, Rfl: 0    No Known Allergies    Objective:  Vitals:    06/29/22 1052   Resp: 19       Left Shoulder Exam     Tenderness   Left shoulder tenderness location: Tenderness proximal humerus  Other   Erythema: absent  Sensation: normal  Pulse: present     Comments:  Shoulder range of motion and strength testing deferred  Physical Exam  Constitutional:       General: She is not in acute distress  Appearance: She is well-developed  HENT:      Head: Normocephalic and atraumatic  Eyes:      General: No scleral icterus  Conjunctiva/sclera: Conjunctivae normal    Neck:      Vascular: No JVD  Cardiovascular:      Rate and Rhythm: Normal rate  Pulmonary:      Effort: Pulmonary effort is normal  No respiratory distress  Skin:     General: Skin is warm  Neurological:      Mental Status: She is alert and oriented to person, place, and time        Coordination: Coordination normal          I have personally reviewed pertinent films in PACS and my interpretation is as follows:  X-rays left shoulder:  Stable greater tuberosity fracture

## 2022-07-06 ENCOUNTER — EVALUATION (OUTPATIENT)
Dept: PHYSICAL THERAPY | Facility: CLINIC | Age: 72
End: 2022-07-06
Payer: COMMERCIAL

## 2022-07-06 DIAGNOSIS — S42.295D OTHER CLOSED NONDISPLACED FRACTURE OF PROXIMAL END OF LEFT HUMERUS WITH ROUTINE HEALING, SUBSEQUENT ENCOUNTER: Primary | ICD-10-CM

## 2022-07-06 PROCEDURE — 97161 PT EVAL LOW COMPLEX 20 MIN: CPT

## 2022-07-06 NOTE — PROGRESS NOTES
PT Evaluation     Today's date: 2022  Patient name: Madie Birmingham  : 1950  MRN: [id number]  Referring provider: Santa Pak  Dx:   Encounter Diagnosis     ICD-10-CM    1  Other closed nondisplaced fracture of proximal end of left humerus with routine healing, subsequent encounter  S42 295D Ambulatory Referral to Physical Therapy         Assessment  Assessment details: Patient is a 66 y o  Female who presents to skilled outpatient PT with referring diagnosis of other closed nondisplaced fracture of proximal end of left humerus with routine healing, subsequent encounter  Primary movement impairment diagnosis of decreased strength, decreased ROM, decreased functional mobility of L UE resulting in pathoanatomical symptoms of L shoulder pain and scapular dysfunction  The aforementioned impairments have limited the patient's ability to floss teeth, curl hair, don clothing, bathing independently, walking (due to lack of reciprocal arm awing),    Patient education performed during today's session included: HEP as noted on flow sheet, nature of shoulder injury, and modalities to help ease pain levels  Patient verbalized understanding of POC  Impairments: Abnormal muscle tone, Abnormal or restricted ROM, Impaired physical strength, Lacks appropriate HEP, Poor posture, Pain with function and Scapular dyskinesis  Understanding of Dx/Px/POC: Excellent  Prognosis: Excellent      Plan  Patient would benefit from: PT Eval and Skilled PT  Planned modality interventions: Biofeedback, Cryotherapy, TENS, Thermotherapy: Hydrocollator Packs and Traction  Planned therapy interventions:  Body mechanics training, Functional ROM exercises, HEP, Joint mobilization, Manual therapy, Neuromuscular re-education, Patient education, Postural training, Strengthening, Stretching, Therapeutic activities, Therapeutic exercises, Therapeutic training, Transfer training and Activity modification  Frequency: 2x/wk  Duration in weeks: 8  Plan of Care beginning date: 22  Plan of Care expiration date: 8 weeks - 22  Treatment plan discussed with: Patient       Goals  Short Term Goals (4 weeks):    - Patient will be independent in basic HEP 2-3 weeks  - Patient will have 0/10 pain at rest  - Patient will demonstrate >1/3 improvement in MMT grade as applicable  - Patient functional goal: Patient will floss teeth independently  Long Term Goals (8 weeks):  - Patient will be independent in a comprehensive home exercise program  - Patient FOTO score will improve to 61/100  - Patient will self-report >75% improvement in function  - Patient functional goal: Patient will resume all self care activities independently and pain free  Subjective    History of Present Illness  - Mechanism of injury: [place] and tripped on a corn stalk, fracturing her L shoulder  Patient complains of weakness and decreased ROM of her L shoulder  She is currently wearing her sling into the clinic today  Patient has  help her do things around the house  Patient is R handed      - Functional limitations: flossing teeth, curl hair, donning clothing    - Patient goals: "Lifting a pot of water to cook, doing hair "      Pain  - Current pain ratin/10  - At best pain ratin/10  - At worst pain ratin/10  - Location: L shoulder   - Alleviating factors: ice, Tylenol     Social Support  - Steps to enter house: 12  - Stairs in house: 3   - Bedroom/bathroom set up: second floor  - Lives in: multi-level house   - Lives with:        Objective   UE MMT  - L Shoulder Flexion: 2/5   R Shoulder Flexion: 5/5  - L Shoulder Extension: 2/5   R Shoulder Extension: 5/5  - L Shoulder Abduction: 2/5   R Shoulder Abduction: 5/5  - L Shoulder  Adduction: 2/5   R Shoulder  Adduction: 5/5  - L Shoulder  IR: 2/5    R Shoulder  IR: 5/5  - L Shoulder  ER: 2/5    R Shoulder  ER: 5/5  - L Elbow Extension: 4/5   R Elbow Extension: 5/5   - L Elbow Flexion: 4/5    R Elbow Flexion: 5/5  - L Wrist Flexion: 5/5    R Wrist Flexion: 5/5  - L Wrist Extension: 5/5   R Wrist Extension: 5/5    Sensation  - Light touch: intact     Palpation   -TTP L biceps tendon origin, L proximal humerus, L UT     Shoulder AROM L R   Flexion 40 deg 180 deg   Extension 10 deg 40 deg   ER 70 deg 70 deg   IR 70 deg 70 deg   Abduction 40 deg 180 deg   Functional IR BTB Occiput C7   Functional ER BTH L5 T12     Shoulder PROM L R   Flexion 120 deg 180 deg   Extension 10 deg 40 deg   ER 70 deg 70 deg   IR 70 deg 70 deg   Abduction 60 deg 180 deg     R handgrip: 26 lbs   L handgrip: 16 lbs      Precautions: L humeral fracture 6/3/2022 (wear sling and restore ROM as per MD recommendation)   Past Medical History:   Diagnosis Date    Anxiety     Arthritis     Disease of thyroid gland     Glaucoma     bilateral    Osteoporosis     [facility])     Vitamin D deficiency          Date 7/6/2022        Visit Number IE                 Manual         GH mobs          AC mobs          Scap mobs          TPR                  Neuro Re-ed         Shoulder isos         Scap retract  10        Serratus push up                                    Thera Ex         Pulleys  10        Shoulder PROM 5'         Pendulums  30 each         Elbow flex/ext           strength          Table slides          Cane flex                           Thera Activity         Patient education         Posture education                                                      Modalities         Ice           Insurance:  AMA/CMS Eval/ Re-eval POC expires Sapphire Erps #/ Referral # Total   Visits  Start date  Expiration date Extension  Visit limitation? PT only or  PT+OT?  Co-Insurance   [employer]  7/6 9/6 32/61 AUTH REQUIRED  7/6   [facility] PT $10 Co-pay                                                                 AUTH #:  Date 7/6              Visits  Authed:  Used                Remaining

## 2022-07-08 ENCOUNTER — OFFICE VISIT (OUTPATIENT)
Dept: PHYSICAL THERAPY | Facility: CLINIC | Age: 72
End: 2022-07-08
Payer: COMMERCIAL

## 2022-07-08 DIAGNOSIS — S42.295D OTHER CLOSED NONDISPLACED FRACTURE OF PROXIMAL END OF LEFT HUMERUS WITH ROUTINE HEALING, SUBSEQUENT ENCOUNTER: Primary | ICD-10-CM

## 2022-07-08 PROCEDURE — 97112 NEUROMUSCULAR REEDUCATION: CPT

## 2022-07-08 PROCEDURE — 97110 THERAPEUTIC EXERCISES: CPT

## 2022-07-08 NOTE — PROGRESS NOTES
Daily Note     Today's date: 2022  Patient name: Mannie Abdi  : 1950  MRN: [id number]  Referring provider: Saul Major  Dx:   Encounter Diagnosis     ICD-10-CM    1  Other closed nondisplaced fracture of proximal end of left humerus with routine healing, subsequent encounter  S42 295D        Start Time: 845  Stop Time: 925  Total time in clinic (min): 40 minutes    Subjective: Patient reports improvements in pain following last session  Objective: See treatment diary below      Assessment: Tolerated treatment well  Continued with gentle range of motion activities with patient reporting improvements in stiffness following pulleys and passive stretching  Loss of ROM is most notable in shoulder abduction, external rotation, and flexion  Trialed AAROM shoulder flexion and abduction within a pain-free range since patient has been D/C from sling with patient tolerating well  Reviewed HEP given at IE and educated patient on current lifting and activity restrictions from physician  Patient expressed understanding of both  Patient would benefit from continued PT to address functional limitations and improve strength in order to return to PLOF  Plan: Continue per plan of care  Progress range motion as tolerated           Date 2022       Visit Number IE 2                Manual         GH mobs          AC mobs          Scap mobs          TPR                  Neuro Re-ed         Shoulder isos         Scap retract  10 x20       Serratus push up         Shrugs  x20                         Thera Ex         Pulleys  10 Standing flexion x 5'; seated scaption x 5'       Shoulder PROM 5'  8'       Pendulums  30 each  reviewed       Elbow flex/ext   2x10        strength   reviewed       Table slides          Cane flex         AAROM wall walks  x10 ea flexion and  scaption (both within pain-free range)                Thera Activity         Patient education  Current restrictions Posture education                                                      Modalities         Ice           Insurance:  AMA/CMS Eval/ Re-eval POC expires Yoandy Favor #/ Referral # Total   Visits  Start date  Expiration date Extension  Visit limitation? PT only or  PT+OT?  Co-Insurance   [employer]  7/6 9/6 32/61 AUTH REQUIRED  7/6   [facility] PT $10 Co-pay                                                                 AUTH #:  Date 7/6              Visits  Authed:  Used                Remaining

## 2022-07-11 ENCOUNTER — OFFICE VISIT (OUTPATIENT)
Dept: PHYSICAL THERAPY | Facility: CLINIC | Age: 72
End: 2022-07-11
Payer: COMMERCIAL

## 2022-07-11 DIAGNOSIS — S42.295D OTHER CLOSED NONDISPLACED FRACTURE OF PROXIMAL END OF LEFT HUMERUS WITH ROUTINE HEALING, SUBSEQUENT ENCOUNTER: Primary | ICD-10-CM

## 2022-07-11 PROCEDURE — 97110 THERAPEUTIC EXERCISES: CPT

## 2022-07-11 NOTE — PROGRESS NOTES
Daily Note     Today's date: 2022  Patient name: Hunter Nicole  : 1950  MRN: [id number]  Referring provider: Rufino Causey  Dx:   Encounter Diagnosis     ICD-10-CM    1  Other closed nondisplaced fracture of proximal end of left humerus with routine healing, subsequent encounter  S42 902D                   Subjective: Patient reports 5/10 "on and off" pain in her L shoulder  Objective: See treatment diary below      Assessment: Tolerated treatment well  Patient demonstrates improvements in L shoulder flexion ROM, with increased pain at available end range  Provided patient with updated HEP to reflect gains made during session  Patient would benefit from continued PT to address functional limitations and improve strength in order to return to PLOF  Plan: Continue per plan of care  Progress range motion as tolerated           Date 2022      Visit Number IE 2 3               Manual         GH mobs          AC mobs          Scap mobs          TPR   L subscap, L lats with PROM x5'                Neuro Re-ed         Shoulder isos         Scap retract  10 x20 x20      Serratus push up         Shrugs  x20 x20                        Thera Ex         Pulleys  10 Standing flexion x 5'; seated scaption x 5' Standing flexion x 5'; seated scaption x 5'      Shoulder PROM 5'  8' 8'      Pendulums  30 each  reviewed       Elbow flex/ext   2x10 2x10       strength   reviewed       Table slides    2x10      Cane flex   Supine 2x10       Cane ER    Supine 2x10       AAROM wall walks  x10 ea flexion and  scaption (both within pain-free range) Wall slides with towel AAROM 2x10                Thera Activity         Patient education  Current restrictions       Posture education                                                      Modalities         Ice           Insurance:  AMA/CMS Eval/ Re-eval POC expires Deng Steele #/ Referral # Total   Visits  Start date  Expiration date Extension  Visit limitation? PT only or  PT+OT?  Co-Insurance   [employer]  7/6 9/6 32/61 AUTH REQUIRED  7/6   [facility] PT $10 Co-pay                                                                 AUTH #:  Date 7/6 7/11             Visits  Authed:  Used                Remaining

## 2022-07-13 ENCOUNTER — OFFICE VISIT (OUTPATIENT)
Dept: PHYSICAL THERAPY | Facility: CLINIC | Age: 72
End: 2022-07-13
Payer: COMMERCIAL

## 2022-07-13 DIAGNOSIS — S42.295D OTHER CLOSED NONDISPLACED FRACTURE OF PROXIMAL END OF LEFT HUMERUS WITH ROUTINE HEALING, SUBSEQUENT ENCOUNTER: Primary | ICD-10-CM

## 2022-07-13 PROCEDURE — 97110 THERAPEUTIC EXERCISES: CPT

## 2022-07-13 NOTE — PROGRESS NOTES
Daily Note      Today's date: 2022  Patient name: Sonia Lewis  : 1950  MRN: [id number]  Referring provider: Shoaib Found  Dx:   Encounter Diagnosis     ICD-10-CM    1  Other closed nondisplaced fracture of proximal end of left humerus with routine healing, subsequent encounter  S42 934D        Subjective: Pt reports that her left arm soreness comes and goes  Pt states it is currently not as bad as last visit  Pt states she performed pulleys at home prior to coming into PT  Objective: See treatment diary below    Assessment: Pt tolerated treatment well  Pt continues to demonstrate good progress toward full L shoulder AA/PROM  Pt demonstrates good carryover of exercise program  Pt noted mild increase in left arm pain with eccentric phase of left shoulder flexion PROM  Reviewed precautions of current phase of healing with pt and she verbalized understanding of this  Plan: Continue per plan of care  Progress ROM as tolerated within current precautions          Date 2022     Visit Number IE 2 3 4              Manual         GH mobs          AC mobs          Scap mobs          TPR   L subscap, L lats with PROM x5'                Neuro Re-ed         Shoulder isos         Scap retract  10 x20 x20 30x      Serratus push up         Shrugs  x20 x20 20x                        Thera Ex         Pulleys  10 Standing flexion x 5'; seated scaption x 5' Standing flexion x 5'; seated scaption x 5' Standing flexion x 3', seated scaption x 3'      Shoulder PROM 5'  8' 8' 8'      Pendulums  30 each  reviewed       Elbow flex/ext   2x10 2x10 2x10       strength   reviewed       Table slides    2x10 2x10      Cane flex   Supine 2x10  Supine 2x10     Cane ER    Supine 2x10  Supine 2x10     AAROM wall walks  x10 ea flexion and  scaption (both within pain-free range) Wall slides with towel AAROM 2x10  Wall slides with towel AAROM 2x10               Thera Activity Patient education  Current restrictions       Posture education                                                      Modalities         Ice           Insurance:  AMA/CMS Eval/ Re-eval POC expires Susan Hi #/ Referral # Total   Visits  Start date  Expiration date Extension  Visit limitation? PT only or  PT+OT?  Co-Insurance   [employer]  7/6 9/6 32/61 AUTH REQUIRED  7/6   [facility] PT $10 Co-pay                                                                 AUTH #:  Date 7/6 7/11             Visits  Authed:  Used                Remaining

## 2022-07-13 NOTE — PROGRESS NOTES
Weight Management Medical Nutrition Assessment  Prachi Diaz is here today for medical meal planning  Current weight 159 5 #  Patient expressed discontent with current weight and feels "fat"  She feels certain foods are "good" or "bad" and has preconceived ideas regarding food ("fruit is fattening" "shrimp raises your cholesterol" "I wouldn't eat carbs, they're fattening")  She identified her downfall is sweets, cookies, and candies  Discussed with patient that there are no "good" or "bad" foods and it all comes down to moderation  Patient's energy needs for maintenance are low so I explained to patient that maintenance and loss could be a matter of a few calories  Per dietary recall, excess calories are from unmeasured added butter and oil and unmeasured sweet snacks (cookies, candies, triscuits)  Low calorie meal plan provided and discussed      Patient seen by Medical Provider in past 6 months:  no  Requested to schedule appointment with Medical Provider: No    Anthropometric Measurements  Start Weight (#): 159 5# (7/15/22)  Current Weight (#): n/a  TBW % Change from start weight: n/a  Ideal Body Weight (#): 120# (64")  Goal Weight (#): 140-145#      Weight Loss History  Previous weight loss attempts:  Exercise  FAD Diets (Cabbage soup, Grapefruit, Cleanse, etc )  Self Created Diets (Portion Control, Healthy Food Choices, etc )    Food and Nutrition Related History  Wake up: 6/7 am   Bed Time: 8 pm    Food Recall  Breakfast (8 am): eggs (2) with peas or mushrooms w/ 1-2 tsp butter, sometimes an orange     Snack (10 am): apple, cookie, and sometimes skips  Lunch (12 pm): cottage cheese (1/2 cup), fruit (3-4 kinds; 1/2 cup) OR tuna with apples, cranberries  Snack (2-230 pm): apple with peanut butter (2 tsp)  Dinner (5 pm): cucumber salad with tomatoes + fish w/ butter and oil OR a salad (HB egg, vinegar and oil, olives, cucumbers, tomatoes, chickpeas, mozzarella cheese) w/ a 1-2 cookies for dessert or a [place] bar Snack (11 pm): skips or may have some triscuits    Beverages: water  Volume of beverage intake: 50 ounces    Weekends: Same  Cravings: sweets  Trouble area of day: none    Frequency of Eating out: 2-3x/week  Food restrictions: meat  Cooking: self  Food Shopping:     Occupation: retired    Physical Activity Intake  Activity: Walking  Frequency: 30 minutes daily  Physical limitations/barriers to exercise: injured arm     Estimated Needs (NEEDS #)  [employer] Needs  BMR: 1,202 kcal  Maintenance calories (sedentary): 1,443 kcal  0 5-1# loss weekly sedentary: 943-1,193 kcal    Protein: 65-82 grams (1 2-1 5g/kg IBW)  Fluid: 55 ounces (30 mL/kg IBW)    Nutrition Diagnosis  Yes;    Food and nutrition related knowledge deficit  related to Lack of prior exposure to accurate nutrition related information as evidenced by Verbalizes inaccurate or incomplete information       Nutrition Intervention    Nutrition Prescription  Calories: 1,000-1,115 kcal  Protein: 65 g  Fluid: 55+ ounces    Meal Plan (Julian/Pro)  Breakfast: 200-215/15  Snack: 100-150/5+  Lunch: 300/15  Snack: 100-150/5+  Dinner:300/25  Snack:-    Nutrition Education  Calorie controlled menu  Lean protein food choices  Healthy snack options  Food journaling tips    Nutrition Counseling  Strategies: meal planning, portion sizes, healthy snack choices, hydration, fiber intake, protein intake, exercise, food logging      Monitoring and Evaluation:    Evaluation criteria  Energy Intake  Meet protein needs  Maintain adequate hydration  Monitor weekly weight  Meal planning/preparation  Food journal   Decreased portions at mealtimes and snacks  Physical activity     Barriers to learning:none  Readiness to change: Preparation:  (Getting ready to change)   Comprehension: good  Expected Compliance: good

## 2022-07-15 ENCOUNTER — OFFICE VISIT (OUTPATIENT)
Dept: BARIATRICS | Facility: CLINIC | Age: 72
End: 2022-07-15

## 2022-07-15 VITALS — WEIGHT: 159.5 LBS | HEIGHT: 63 IN | BODY MASS INDEX: 28.26 KG/M2

## 2022-07-15 DIAGNOSIS — R63.5 ABNORMAL WEIGHT GAIN: Primary | ICD-10-CM

## 2022-07-15 PROCEDURE — WMDI30

## 2022-07-15 PROCEDURE — RECHECK

## 2022-07-19 ENCOUNTER — OFFICE VISIT (OUTPATIENT)
Dept: PHYSICAL THERAPY | Facility: CLINIC | Age: 72
End: 2022-07-19
Payer: COMMERCIAL

## 2022-07-19 DIAGNOSIS — S42.295D TRAUMATIC CLOSED NONDISPLACED FRACTURE OF ANATOMICAL NECK OF HUMERUS, LEFT, WITH ROUTINE HEALING, SUBSEQUENT ENCOUNTER: Primary | ICD-10-CM

## 2022-07-19 PROCEDURE — 97140 MANUAL THERAPY 1/> REGIONS: CPT

## 2022-07-19 PROCEDURE — 97110 THERAPEUTIC EXERCISES: CPT

## 2022-07-19 NOTE — PROGRESS NOTES
Daily Note      Today's date: 2022  Patient name: Cheryl Kumar  : 1950  MRN: [id number]  Referring provider: Jenae De León  Dx:   Encounter Diagnosis     ICD-10-CM    1  Traumatic closed nondisplaced fracture of anatomical neck of humerus, left, with routine healing, subsequent encounter  S42 295D      Patient co-treated by Physical Therapy Student August Jackson, under my direct supervision  Subjective: Pt reports that she has been feeling more pain in the L arm, so she has been taking more tylenol  She also states she has been able to move the L arm more the past few days  Pt reports difficulty sleeping on the L side without pain  Objective: See treatment diary below    Assessment: Pt tolerated treatment well  Pt progressed to standing wall slides AAROM, but was able to tolerate some L unilateral wall slides  Pt able to tolerate active assisted scaption with cane in standing, although AROM and PROM is limited in abduction and flexion  Pt able to tolerate some weightbearing through the L arm with serratus pushups on the wall  Pt would benefit from continued PT to address strength and ROM deficits so that pt can return to PLOF and be able to perform ADLs independently  Plan: Seated Nu Step NV          Date 2022    Visit Number IE 2 3 4 5             Manual         GH mobs          AC mobs          Scap mobs          TPR   L subscap, L lats with PROM x5'   L subscap, L lats with PROM x5'              Neuro Re-ed         Shoulder isos         Scap retract  10 x20 x20 30x  YTB shoulder ext 2x10    TB Rows     YTB 2x10             Serratus push up     2x10    Shrugs  x20 x20 20x                        Thera Ex         Pulleys  10 Standing flexion x 5'; seated scaption x 5' Standing flexion x 5'; seated scaption x 5' Standing flexion x 3', seated scaption x 3'  Seated scaption    Shoulder PROM 5'  8' 8' 8'  5'    Pendulums  30 each  reviewed       Elbow flex/ext   2x10 2x10 2x10  DC to HEP     strength   reviewed       Table slides    2x10 2x10  -    Cane flex   Supine 2x10  Supine 2x10 Supine 2x10    Cane abd 45 deg     Standing x20    Cane ER    Supine 2x10  Supine 2x10 Supine 2x10    AAROM wall walks  x10 ea flexion and  scaption (both within pain-free range) Wall slides with towel AAROM 2x10  Wall slides with towel AAROM 2x10  Wall slides with towel standing AAROM 2x10             Thera Activity         Patient education  Current restrictions       Posture education                                                      Modalities         Ice           Insurance:  AMA/CMS Eval/ Re-eval POC expires Dorna Said #/ Referral # Total   Visits  Start date  Expiration date Extension  Visit limitation? PT only or  PT+OT?  Co-Insurance   [employer]  7/6 9/6 32/61 AUTH REQUIRED 99 7/6   [facility] PT $10 Co-pay                                                                 AUTH #:  Date 7/6 7/8 7/11 7/13 7/18          Visits  Authed: 99 Used 1 1 1 1 1           Remaining  98 [phone]

## 2022-07-21 ENCOUNTER — OFFICE VISIT (OUTPATIENT)
Dept: PHYSICAL THERAPY | Facility: CLINIC | Age: 72
End: 2022-07-21
Payer: COMMERCIAL

## 2022-07-21 DIAGNOSIS — S42.295D OTHER CLOSED NONDISPLACED FRACTURE OF PROXIMAL END OF LEFT HUMERUS WITH ROUTINE HEALING, SUBSEQUENT ENCOUNTER: ICD-10-CM

## 2022-07-21 DIAGNOSIS — S42.295D TRAUMATIC CLOSED NONDISPLACED FRACTURE OF ANATOMICAL NECK OF HUMERUS, LEFT, WITH ROUTINE HEALING, SUBSEQUENT ENCOUNTER: Primary | ICD-10-CM

## 2022-07-21 PROCEDURE — 97140 MANUAL THERAPY 1/> REGIONS: CPT

## 2022-07-21 PROCEDURE — 97110 THERAPEUTIC EXERCISES: CPT

## 2022-07-21 NOTE — PROGRESS NOTES
Daily Note      Today's date: 2022  Patient name: Lucian Calloway  : 1950  MRN: [id number]  Referring provider: Margarita Gutierrez  Dx:   Encounter Diagnosis     ICD-10-CM    1  Traumatic closed nondisplaced fracture of anatomical neck of humerus, left, with routine healing, subsequent encounter  S42 295D    2  Other closed nondisplaced fracture of proximal end of left humerus with routine healing, subsequent encounter  S42 295D      Patient co-treated by Physical Therapy Student Hailey Vazquez, under my direct supervision  Subjective: Pt reports that the past two days she has been feeling "6/10 pain 90% of the time"  Pt reports having mopped her floors twice in the past two days  Objective: See treatment diary below    Assessment: Pt tolerated treatment fair  Pt had moderate tightness and soreness in L UT and L scapular muscles  Pt responded to heat to L shoulder and STM to L UT positively  Pt was educated on relative rest in the next two days and decreasing manual workload at this point in healing process  Pt verbalized understanding of this  Pt given L UT and L levator scap stretches to perform at home to assist in pain reduction  Due to acute increase in irritability, pt did not perform normal therex program today  Pt would benefit from continued PT to increase L shoulder strength and L shoulder ROM in all planes  Plan: Continue per plan of care          Date 2022   Visit Number IE 2 3 4 5 6            Manual         GH mobs          AC mobs          Scap mobs          TPR   L subscap, L lats with PROM x5'   L subscap, L lats with PROM x5'     STM      L UT/levator scap x15'    Neuro Re-ed         Shoulder isos         Scap retract  10 x20 x20 30x  YTB shoulder ext 2x10    TB Rows     YTB 2x10             Serratus push up     2x10    Shrugs  x20 x20 20x                        Thera Ex         Pulleys  10 Standing flexion x 5'; seated scaption x 5' Standing flexion x 5'; seated scaption x 5' Standing flexion x 3', seated scaption x 3'  Seated scaption    Shoulder PROM 5'  8' 8' 8'  5'    Pendulums  30 each  reviewed       Elbow flex/ext   2x10 2x10 2x10  DC to HEP     strength   reviewed       Table slides    2x10 2x10  -    Cane flex   Supine 2x10  Supine 2x10 Supine 2x10    Cane abd 45 deg     Standing x20    Cane ER    Supine 2x10  Supine 2x10 Supine 2x10    AAROM wall walks  x10 ea flexion and  scaption (both within pain-free range) Wall slides with towel AAROM 2x10  Wall slides with towel AAROM 2x10  Wall slides with towel standing AAROM 2x10    L UT stretch      3x30s   L levator scap stretch      3x30s                     Thera Activity         Patient education  Current restrictions       Posture education                                                      Modalities         Heat pre      L shoulder x5'     Insurance:  AMA/CMS Eval/ Re-eval POC expires Kylah Daughters #/ Referral # Total   Visits  Start date  Expiration date Extension  Visit limitation? PT only or  PT+OT?  Co-Insurance   [employer]  7/6 9/6 32/61 AUTH REQUIRED 99 7/6   BOMN PT $10 Co-pay                                                                 AUTH #:  Date 7/6 7/8 7/11 7/13 7/18 7/21         Visits  Authed: 99 Used 1 1 1 1 1 1          Remaining  98 97 [phone]

## 2022-07-26 ENCOUNTER — OFFICE VISIT (OUTPATIENT)
Dept: PHYSICAL THERAPY | Facility: CLINIC | Age: 72
End: 2022-07-26
Payer: COMMERCIAL

## 2022-07-26 DIAGNOSIS — S42.295D TRAUMATIC CLOSED NONDISPLACED FRACTURE OF ANATOMICAL NECK OF HUMERUS, LEFT, WITH ROUTINE HEALING, SUBSEQUENT ENCOUNTER: Primary | ICD-10-CM

## 2022-07-26 DIAGNOSIS — S42.295D OTHER CLOSED NONDISPLACED FRACTURE OF PROXIMAL END OF LEFT HUMERUS WITH ROUTINE HEALING, SUBSEQUENT ENCOUNTER: ICD-10-CM

## 2022-07-26 PROCEDURE — 97110 THERAPEUTIC EXERCISES: CPT

## 2022-07-26 PROCEDURE — 97140 MANUAL THERAPY 1/> REGIONS: CPT

## 2022-07-26 NOTE — PROGRESS NOTES
Daily Note      Today's date: 2022  Patient name: Sukhi Aguilera  : 1950  MRN: [id number]  Referring provider: Gerardo Ruff  Dx:   Encounter Diagnosis     ICD-10-CM    1  Traumatic closed nondisplaced fracture of anatomical neck of humerus, left, with routine healing, subsequent encounter  S42 295D    2  Other closed nondisplaced fracture of proximal end of left humerus with routine healing, subsequent encounter  S42 295D      Patient co-treated by Physical Therapy Student Amos Foreman, under my direct supervision  Subjective: Pt reports that the past few days she has had less pain, and "have been taking less tylenol"  Pt reports that she has difficulty lifting items such as multiple dishes at a time  Objective: See treatment diary below    Active L shoulder flexion in standin deg  Active L shoulder abduction in standin deg    Assessment: Pt tolerated treatment well  Pt able to tolerate PROM and active assisted ROM of L shoulder well  Pt demonstrates deficits in active L shoulder ROM compared with PROM due to weakness of L shoulder and scapular stabilizing muscles  Pt would benefit from continued PT to increase passive and active L shoulder ROM and increase strength in L UE to be able to perform household chores independently  Plan: Continue per plan of care          Date 22   Visit Number 7 2 3 4 5 6            Manual         GH mobs          AC mobs          Scap mobs          TPR L subscap, L lats with PROM x5'   L subscap, L lats with PROM x5'   L subscap, L lats with PROM x5'     STM L UT 5'     L UT/levator scap x15'    Neuro Re-ed         Shoulder isos         Scap retract  YTB shoulder ext 2x10 x20 x20 30x  YTB shoulder ext 2x10    TB Rows YTB 2x10    YTB 2x10             Serratus push up 2x10    2x10    Shrugs  x20 x20 20x                        Thera Ex         Pulleys  HEP Standing flexion x 5'; seated scaption x 5' Standing flexion x 5'; seated scaption x 5' Standing flexion x 3', seated scaption x 3'  Seated scaption    Shoulder PROM 5'  8' 8' 8'  5'    Pendulums  HEP reviewed       Elbow flex/ext  HEP 2x10 2x10 2x10  DC to HEP     strength  HEP reviewed       Table slides  DC  2x10 2x10  -    Cane flex Supine 2x10  Supine 2x10  Supine 2x10 Supine 2x10    Cane abd 45 deg Standing x20    Standing x20    Cane ER  Supine 2x10  Supine 2x10  Supine 2x10 Supine 2x10                               AAROM wall walks HEP x10 ea flexion and  scaption (both within pain-free range) Wall slides with towel AAROM 2x10  Wall slides with towel AAROM 2x10  Wall slides with towel standing AAROM 2x10    L UT stretch      3x30s   L levator scap stretch      3x30s                     Thera Activity         Patient education  Current restrictions       Posture education                                                      Modalities         Heat pre      L shoulder x5'     Insurance:  AMA/CMS Eval/ Re-eval POC expires Yoandy Favor #/ Referral # Total   Visits  Start date  Expiration date Extension  Visit limitation? PT only or  PT+OT?  Co-Insurance   [employer]  7/6 9/6 32/61 AUTH REQUIRED 99 7/6   BOMN PT $10 Co-pay                                                                 AUTH #:  Date 7/6 7/8 7/11 7/13 7/18 7/21 7/26        Visits  Authed: 99 Used 1 1 1 1 1 1 1         Remaining  98 97 96 [phone]

## 2022-07-27 ENCOUNTER — OFFICE VISIT (OUTPATIENT)
Dept: OBGYN CLINIC | Facility: CLINIC | Age: 72
End: 2022-07-27
Payer: COMMERCIAL

## 2022-07-27 ENCOUNTER — APPOINTMENT (OUTPATIENT)
Dept: RADIOLOGY | Facility: CLINIC | Age: 72
End: 2022-07-27
Payer: COMMERCIAL

## 2022-07-27 VITALS — BODY MASS INDEX: 28.17 KG/M2 | WEIGHT: 159 LBS | HEIGHT: 63 IN | TEMPERATURE: 98.2 F

## 2022-07-27 DIAGNOSIS — S42.295D OTHER CLOSED NONDISPLACED FRACTURE OF PROXIMAL END OF LEFT HUMERUS WITH ROUTINE HEALING, SUBSEQUENT ENCOUNTER: Primary | ICD-10-CM

## 2022-07-27 DIAGNOSIS — S42.295D OTHER CLOSED NONDISPLACED FRACTURE OF PROXIMAL END OF LEFT HUMERUS WITH ROUTINE HEALING, SUBSEQUENT ENCOUNTER: ICD-10-CM

## 2022-07-27 PROCEDURE — 99213 OFFICE O/P EST LOW 20 MIN: CPT | Performed by: PHYSICIAN ASSISTANT

## 2022-07-27 PROCEDURE — 73030 X-RAY EXAM OF SHOULDER: CPT

## 2022-07-27 NOTE — PROGRESS NOTES
Assessment/Plan:  1  Other closed nondisplaced fracture of proximal end of left humerus with routine healing, subsequent encounter  XR shoulder 2+ vw left     The patient is making significant progress with range of motion strength, and has progressive healing on x-rays today  She will continue physical therapy for more strengthening at this point  I explained that her fatigue is likely unrelated to her shoulder, and advised she see her PCP she will likely need blood work to evaluate for possible etiology of this  She will follow up in 4 weeks with repeat x-rays at that time  Subjective:   Sonia Mccartney is a 66 y o  female who presents today for follow-up of her left shoulder, now about 2 months status post closed treatment of proximal humerus fracture  The patient has been doing physical therapy and notes significant progress with this  She still does get some discomfort about the lateral shoulder, which is worse with activity and better with rest   She also notes some difficulty with internal rotation of the shoulder  She notes good sensation of the left upper extremity  She also notes feeling fatigued recently, and is inquiring if this is related to her shoulder  Review of Systems   Constitutional: Negative  Negative for chills and fever  HENT: Negative  Negative for ear pain and sore throat  Eyes: Negative  Negative for pain and redness  Respiratory: Negative  Negative for shortness of breath and wheezing  Cardiovascular: Negative for chest pain and palpitations  Gastrointestinal: Negative  Negative for abdominal pain and blood in stool  Endocrine: Negative  Negative for polydipsia and polyuria  Genitourinary: Negative  Negative for difficulty urinating and dysuria  Musculoskeletal:        As noted in HPI   Skin: Negative  Negative for pallor and rash  Neurological: Negative  Negative for dizziness and numbness  Hematological: Negative  Negative for adenopathy  Does not bruise/bleed easily  Psychiatric/Behavioral: Negative  Negative for confusion and suicidal ideas           Past Medical History:   Diagnosis Date    Anxiety     Arthritis     Disease of thyroid gland     Glaucoma     bilateral    Osteoporosis     SVT (supraventricular tachycardia) (HCC)     Vitamin D deficiency        Past Surgical History:   Procedure Laterality Date    BREAST LUMPECTOMY      CARDIAC ELECTROPHYSIOLOGY STUDY AND ABLATION       SECTION         Family History   Problem Relation Age of Onset    Arthritis Mother     Arthritis Father     Cancer Sister        Social History     Occupational History    Not on file   Tobacco Use    Smoking status: Never Smoker    Smokeless tobacco: Never Used   Substance and Sexual Activity    Alcohol use: No    Drug use: No    Sexual activity: Not on file         Current Outpatient Medications:     ALPRAZolam (XANAX) 0 25 mg tablet, Take by mouth daily at bedtime as needed for anxiety (Patient not taking: Reported on 2022), Disp: , Rfl:     bimatoprost (LUMIGAN) 0 01 % ophthalmic drops, Apply 1 drop to eye Daily, Disp: , Rfl:     Biotin 1 MG CAPS, Take 1 capsule by mouth daily (Patient not taking: Reported on 2022), Disp: , Rfl:     bisoprolol (ZEBETA) 10 MG tablet, Take 2 5 tablets (25 mg total) by mouth daily as needed (SVT) (Patient not taking: Reported on 2022), Disp: 30 tablet, Rfl: 0    bisoprolol (ZEBETA) 5 mg tablet, , Disp: , Rfl:     Cholecalciferol (Vitamin D3) 50 MCG (2000 UT) TABS, Take 2 tablets (4,000 Units total) by mouth daily (Patient taking differently: Take 2,000 Units by mouth daily 5000 daily), Disp: 90 tablet, Rfl: 3    ibuprofen (MOTRIN) 200 mg tablet, Take 2 tablets (400 mg total) by mouth every 6 (six) hours as needed for mild pain (Patient not taking: Reported on 2022), Disp: 24 tablet, Rfl: 0    Jublia 10 % SOLN, , Disp: , Rfl:     levothyroxine 75 mcg tablet, Take 1 tablet (75 mcg total) by mouth daily, Disp: 30 tablet, Rfl: 0    meclizine (ANTIVERT) 25 mg tablet, Take 1 tablet (25 mg total) by mouth 3 (three) times a day as needed for dizziness (Patient not taking: Reported on 6/29/2022), Disp: 30 tablet, Rfl: 0    methylPREDNISolone 4 MG tablet therapy pack, Use as directed on package (Patient not taking: Reported on 6/29/2022), Disp: 21 tablet, Rfl: 0    Multiple Vitamin (multivitamin) tablet, Take 1 tablet by mouth daily, Disp: , Rfl:     No Known Allergies    Objective:  Vitals:    07/27/22 1353   Temp: 98 2 °F (36 8 °C)       Left Shoulder Exam     Tenderness   Left shoulder tenderness location: Tenderness proximal humerus  Range of Motion   External rotation: 40   Forward flexion: 120   Internal rotation 0 degrees: Sacrum     Other   Erythema: absent  Sensation: normal  Pulse: present             Physical Exam  Constitutional:       General: She is not in acute distress  Appearance: She is well-developed  HENT:      Head: Normocephalic and atraumatic  Eyes:      General: No scleral icterus  Conjunctiva/sclera: Conjunctivae normal    Neck:      Vascular: No JVD  Cardiovascular:      Rate and Rhythm: Normal rate  Pulmonary:      Effort: Pulmonary effort is normal  No respiratory distress  Skin:     General: Skin is warm  Neurological:      Mental Status: She is alert and oriented to person, place, and time        Coordination: Coordination normal          I have personally reviewed pertinent films in PACS and my interpretation is as follows:  X-rays left shoulder:  Healing proximal humerus fracture

## 2022-07-28 ENCOUNTER — OFFICE VISIT (OUTPATIENT)
Dept: PHYSICAL THERAPY | Facility: CLINIC | Age: 72
End: 2022-07-28
Payer: COMMERCIAL

## 2022-07-28 DIAGNOSIS — S42.295D TRAUMATIC CLOSED NONDISPLACED FRACTURE OF ANATOMICAL NECK OF HUMERUS, LEFT, WITH ROUTINE HEALING, SUBSEQUENT ENCOUNTER: Primary | ICD-10-CM

## 2022-07-28 PROCEDURE — 97110 THERAPEUTIC EXERCISES: CPT

## 2022-07-28 PROCEDURE — 97140 MANUAL THERAPY 1/> REGIONS: CPT

## 2022-07-28 NOTE — PROGRESS NOTES
Daily Note      Today's date: 2022  Patient name: Shi Molina  : 1950  MRN: [id number]  Referring provider: Francis Davidson  Dx:   Encounter Diagnosis     ICD-10-CM    1  Traumatic closed nondisplaced fracture of anatomical neck of humerus, left, with routine healing, subsequent encounter  S42 295D      Patient co-treated by Physical Therapy Student Dennise Schirmer, under my direct supervision  Subjective: Pt reports that she went to the doctor yesterday 22, and the doctor reported that everything looked good and to continue doing PT  Objective: See treatment diary below      Assessment: Pt tolerated treatment well  Pt scapular muscle strength progressing well, and she is able to tolerate some WB activity on the wall and increased band strength for rows and extensions  Pt has moderate difficulty with active IR BTB  Pt able to tolerate active standing cane extension, with some wrist flexion compensation noted  Pt still has moderate limitations in active and passive L shoulder ROM in flexion, abduction, and IR  Pt would benefit from continued PT to increase L shoulder ROM  Plan: Continue per plan of care  Re-evaluation to be performed next visit          Date 22   Visit Number 7 8  4 5 6            Manual         GH mobs          AC mobs          Scap mobs          TPR L subscap, L lats with PROM x5'  L subscap, L lats with PROM x5' L subscap, L lats with PROM x5'   L subscap, L lats with PROM x5'     STM L UT 5' L UT 5'    L UT/levator scap x15'    Neuro Re-ed         Shoulder isos         Scap retract  YTB shoulder ext 2x10 RTB shoulder ext 2x10 x20 30x  YTB shoulder ext 2x10    TB Rows YTB 2x10 GTB 2x10   YTB 2x10             Serratus push up 2x10 2x10   2x10    Shrugs  x20 x20 20x                        Thera Ex         Pulleys  HEP Seated flexion x 5'; seated scaption x 5' Standing flexion x 5'; seated scaption x 5' Standing flexion x 3', seated scaption x 3'  Seated scaption    Shoulder PROM 5'  8' 8' 8'  5'    Pendulums  HEP reviewed       Elbow flex/ext  HEP 2x10 2x10 2x10  DC to HEP     strength  HEP reviewed       Table slides  DC  2x10 2x10  -    Cane flex Supine 2x10 Supine 2x10 Supine 2x10  Supine 2x10 Supine 2x10    Cane abd 45 deg Standing x20 Standing x20   Standing x20    Cane ER  Supine 2x10 Supine 2x10 Supine 2x10  Supine 2x10 Supine 2x10    Cane extension  Standing x30    Slide up into IR x20       Sleeper stretch  x20                AAROM wall walks HEP x10 ea flexion and  scaption (both within pain-free range) Wall slides with towel AAROM 2x10  Wall slides with towel AAROM 2x10  Wall slides with towel standing AAROM 2x10    L UT stretch      3x30s   L levator scap stretch      3x30s                     Thera Activity         Patient education  Current restrictions       Posture education                                                      Modalities         Heat pre      L shoulder x5'     Insurance:  AMA/CMS Eval/ Re-eval POC expires Booker Aguiar #/ Referral # Total   Visits  Start date  Expiration date Extension  Visit limitation? PT only or  PT+OT?  Co-Insurance   [employer]  7/6 9/6 32/61 AUTH REQUIRED 99 7/6   BOMN PT $10 Co-pay                                                                 AUTH #:  Date 7/6 7/8 7/11 7/13 7/18 7/21 7/26 7/28       Visits  Authed: 99 Used 1 1 1 1 1 1 1 1        Remaining  98 97 96 [phone]

## 2022-08-01 ENCOUNTER — EVALUATION (OUTPATIENT)
Dept: PHYSICAL THERAPY | Facility: CLINIC | Age: 72
End: 2022-08-01
Payer: COMMERCIAL

## 2022-08-01 DIAGNOSIS — S42.295D OTHER CLOSED NONDISPLACED FRACTURE OF PROXIMAL END OF LEFT HUMERUS WITH ROUTINE HEALING, SUBSEQUENT ENCOUNTER: ICD-10-CM

## 2022-08-01 DIAGNOSIS — S42.295D TRAUMATIC CLOSED NONDISPLACED FRACTURE OF ANATOMICAL NECK OF HUMERUS, LEFT, WITH ROUTINE HEALING, SUBSEQUENT ENCOUNTER: Primary | ICD-10-CM

## 2022-08-01 PROCEDURE — 97112 NEUROMUSCULAR REEDUCATION: CPT

## 2022-08-01 PROCEDURE — 97110 THERAPEUTIC EXERCISES: CPT

## 2022-08-01 NOTE — PROGRESS NOTES
PT Re-Evaluation     Today's date: 2022  Patient name: Ben Umaña  : 1950  MRN: [id number]  Referring provider: Katlin Castro  Dx:   Encounter Diagnosis     ICD-10-CM    1  Traumatic closed nondisplaced fracture of anatomical neck of humerus, left, with routine healing, subsequent encounter  S42 295D    2  Other closed nondisplaced fracture of proximal end of left humerus with routine healing, subsequent encounter  S42 295D          Assessment  Assessment details: Patient is a 66 y o  Female who presents to skilled outpatient PT with referring diagnosis of other closed nondisplaced fracture of proximal end of left humerus with routine healing, subsequent encounter  Primary movement impairment diagnosis of decreased strength, decreased ROM, decreased functional mobility of L UE resulting in pathoanatomical symptoms of L shoulder pain and scapular dysfunction  The aforementioned impairments limit the patient's ability to lift items overhead, performing heavy household cleaning, and opening doors with the L arm  Pt has met all short term and long term goals set at initial evaluation  Pt's pain levels and L shoulder ROM have improved, but she demonstrates severe strength deficits in L shoulder flexion, abd, extension, ER/IR, as well as moderate L shoulder ROM deficits in flexion, abduction, and active ER/IR  Patient would benefit from continued PT to increase L shoulder and scapular strength and L shoulder ROM in all planes  Impairments: Abnormal muscle tone, Abnormal or restricted ROM, Impaired physical strength, Poor posture, Pain with function and Scapular dyskinesis  Understanding of Dx/Px/POC: Excellent  Prognosis: Excellent      Plan  Patient would benefit from: PT Eval and Skilled PT  Planned modality interventions: Biofeedback, Cryotherapy, TENS, Thermotherapy: Hydrocollator Packs and Traction  Planned therapy interventions:  Body mechanics training, Functional ROM exercises, HEP, Joint mobilization, Manual therapy, Neuromuscular re-education, Patient education, Postural training, Strengthening, Stretching, Therapeutic activities, Therapeutic exercises, Therapeutic training, Transfer training and Activity modification  Frequency: 2x/wk  Duration in weeks: 8  Plan of Care beginning date: 22  Plan of Care expiration date: 8 weeks - 22  Treatment plan discussed with: Patient       Goals  Short Term Goals (4 weeks):  MET as of   - Patient will be independent in basic HEP 2-3 weeks  - Patient will have 0/10 pain at rest  - Patient will demonstrate >1/3 improvement in MMT grade as applicable  - Patient functional goal: Patient will floss teeth independently  Long Term Goals (8 weeks): MET as of   - Patient will be independent in a comprehensive home exercise program  - Patient FOTO score will improve to 61/100  - Patient will self-report >75% improvement in function  - Patient functional goal: Patient will resume all self care activities independently and pain free  Updated goals per :    - Patient will be able to mop floors with <3/10 pain  - Patient will be able to lift 5lbs (the weight of 2 dinner plates) overhead with <2/10 pain  - Patient will be able to open a door with her L arm with <2/10 pain    Subjective    History of Present Illness  - Mechanism of injury: Patient [place] and tripped on a corn stalk, fracturing her L shoulder  Pt says she is a little bit sore in the L shoulder today "because it's raining"  Pt reports she generally has achiness in the L shoulder, but no pain   Pt reports 75% improvement from initial evaluation     - Functional limitations: reaching L shoulder overhead, reaching behind the back, lifting with L arm    - Patient goals: mopping, reaching overhead while holding dishes, and open a door with her L arm    Pain  - Current pain ratin/10  - At best pain ratin/10  - At worst pain ratin/10  - Location: L shoulder   - Alleviating factors: ice, Tylenol     Social Support  - Steps to enter house: 12  - Stairs in house: 3   - Bedroom/bathroom set up: second floor  - Lives in: multi-level house   - Lives with:        Objective   UE MMT  - L Shoulder Flexion: 2/5   R Shoulder Flexion: 5/5  - L Shoulder Extension: 2/5   R Shoulder Extension: 5/5  - L Shoulder Abduction: 2/5   R Shoulder Abduction: 5/5  - L Shoulder  Adduction: 2/5   R Shoulder  Adduction: 5/5  - L Shoulder  IR: 2/5    R Shoulder  IR: 5/5  - L Shoulder  ER: 2/5    R Shoulder  ER: 5/5  - L Elbow Extension: 4/5   R Elbow Extension: 5/5   - L Elbow Flexion: 5/5    R Elbow Flexion: 5/5  - L Wrist Flexion: 5/5    R Wrist Flexion: 5/5  - L Wrist Extension: 5/5   R Wrist Extension: 5/5    Sensation  - Light touch: intact     Palpation   -TTP L biceps tendon origin, L proximal humerus, L UT     Shoulder AROM L R   Flexion 120 deg 180 deg   Extension 30 deg 40 deg   ER 70 deg 70 deg   IR 70 deg 70 deg   Abduction 120 deg 180 deg   Functional IR BTB Occiput C7   Functional ER BTH L5 T12     Shoulder PROM L R   Flexion 130 deg 180 deg   Extension 35 deg 40 deg   ER 75 deg 70 deg   IR 75 deg 70 deg   Abduction 90 deg 180 deg     R handgrip: 32 lbs   L handgrip: 19 lbs      Precautions: L humeral fracture 6/3/2022 (wear sling and restore ROM as per MD recommendation)   Past Medical History:   Diagnosis Date    Anxiety     Arthritis     Disease of thyroid gland     Glaucoma     bilateral    Osteoporosis     [facility]     Vitamin D deficiency           Date 7/26 7/28 8/1 7/18 7/21   Visit Number 7 8 9  5 6            Manual         GH mobs          AC mobs          Scap mobs          TPR L subscap, L lats with PROM x5'  L subscap, L lats with PROM x5'    L subscap, L lats with PROM x5'     STM L UT 5' L UT 5' L UT 5'   L UT/levator scap x15'    Neuro Re-ed         Shoulder isos         Scap retract  YTB shoulder ext 2x10 RTB shoulder ext 2x10 RTB shoulder ext 2x10 30x  YTB shoulder ext 2x10    TB Rows YTB 2x10 GTB 2x10   YTB 2x10             Serratus push up 2x10 2x10   2x10    Shrugs  x20  20x                        Thera Ex         Pulleys  HEP Seated flexion x 5'; seated scaption x 5' Standing flexion x 5'; seated scaption x 5' Standing flexion x 3', seated scaption x 3'  Seated scaption    Shoulder PROM 5'  8'  8'  5'    Pendulums  HEP reviewed       Elbow flex/ext  HEP 2x10  2x10  DC to HEP     strength  HEP reviewed       Table slides  DC  DC 2x10  -    Cane flex Supine 2x10 Supine 2x10   Supine 2x10 Supine 2x10    Cane abd 45 deg Standing x20 Standing x20 Standing x20  Standing x20    Cane ER  Supine 2x10 Supine 2x10 Standing 2x10  Supine 2x10 Supine 2x10    Cane extension  Standing x30    Slide up into IR x20 Standing x30    Slide up into IR x20      Sleeper stretch  x20                AAROM wall walks HEP x10 ea flexion and  scaption (both within pain-free range) Wall slides with towel AAROM 2x10  Wall slides with towel AAROM 2x10  Wall slides with towel standing AAROM 2x10    L UT stretch      3x30s   L levator scap stretch      3x30s                     Thera Activity         Patient education  Current restrictions       Posture education                                                      Modalities         Heat pre      L shoulder x5'     Insurance:  AMA/CMS Eval/ Re-eval POC expires Octaviano Kerr #/ Referral # Total   Visits  Start date  Expiration date Extension  Visit limitation? PT only or  PT+OT?  Co-Insurance   [employer]  7/6 9/6 32/61 AUTH REQUIRED 99 7/6   BOMN PT $10 Co-pay                                                                 AUTH #:  Date 7/6 7/8 7/11 7/13 7/18 7/21 7/26 7/28 8/1      Visits  Authed: 99 Used 1 1 1 1 1 1 1 1 1       Remaining  98 97 96 [phone]

## 2022-08-03 ENCOUNTER — APPOINTMENT (OUTPATIENT)
Dept: PHYSICAL THERAPY | Facility: CLINIC | Age: 72
End: 2022-08-03
Payer: COMMERCIAL

## 2022-08-04 ENCOUNTER — OFFICE VISIT (OUTPATIENT)
Dept: PHYSICAL THERAPY | Facility: CLINIC | Age: 72
End: 2022-08-04
Payer: COMMERCIAL

## 2022-08-04 DIAGNOSIS — S42.295D OTHER CLOSED NONDISPLACED FRACTURE OF PROXIMAL END OF LEFT HUMERUS WITH ROUTINE HEALING, SUBSEQUENT ENCOUNTER: ICD-10-CM

## 2022-08-04 DIAGNOSIS — S42.295D TRAUMATIC CLOSED NONDISPLACED FRACTURE OF ANATOMICAL NECK OF HUMERUS, LEFT, WITH ROUTINE HEALING, SUBSEQUENT ENCOUNTER: Primary | ICD-10-CM

## 2022-08-04 PROCEDURE — 97110 THERAPEUTIC EXERCISES: CPT

## 2022-08-04 PROCEDURE — 97140 MANUAL THERAPY 1/> REGIONS: CPT

## 2022-08-04 NOTE — PROGRESS NOTES
Daily Note      Today's date: 2022  Patient name: Geraldina Libman  : 1950  MRN: [id number]  Referring provider: Olga Campos  Dx:   Encounter Diagnosis     ICD-10-CM    1  Traumatic closed nondisplaced fracture of anatomical neck of humerus, left, with routine healing, subsequent encounter  S42 295D    2  Other closed nondisplaced fracture of proximal end of left humerus with routine healing, subsequent encounter  S42 295D      Patient co-treated by Physical Therapy Student Miah Dela Cruz, under my direct supervision  Subjective: Pt reports that she tried to mop with the R arm yesterday, but it bothered her L shoulder so she had to take tylenol after  Objective: See treatment diary below      Assessment: Pt tolerated treatment well  Pt tolerates passive and active ROM well, but has UT compensation during active L shoulder elevation  Pt still lacks full ROM in L shoulder in all planes, but has continued to make ROM progress each session  Pt would benefit from continued PT to increase L shoulder ROM and L shoulder and scapular strength  Plan: Continue per plan of care            Date    Visit Number 7 8 9 10 5 6            Manual         GH mobs          AC mobs          Scap mobs          TPR L subscap, L lats with PROM x5'  L subscap, L lats with PROM x5'   L subscap, L lats with PROM x5' L subscap, L lats with PROM x5'     STM L UT 5' L UT 5' L UT 5' L UT 5'  L UT/levator scap x15'    Neuro Re-ed         Shoulder isos         Scap retract  YTB shoulder ext 2x10 RTB shoulder ext 2x10 RTB shoulder ext 2x10 YTB shoulder ext 2x10   YTB shoulder ext 2x10    TB Rows YTB 2x10 GTB 2x10  GTB 2x10 YTB 2x10             Serratus push up 2x10 2x10  2x10 2x10    Shrugs  x20                         Thera Ex         Pulleys  HEP Seated flexion x 5'; seated scaption x 5' Standing flexion x 5'; seated scaption x 5' Standing flexion x 3', seated scaption x 3'  Seated scaption Shoulder PROM 5'  8'  8'  5'    Pendulums  HEP reviewed       Elbow flex/ext  HEP 2x10    DC to HEP     strength  HEP reviewed       Table slides  DC  DC 2x10  -    Cane flex Supine 2x10 Supine 2x10   Supine 2x10 with half bolster under back Supine 2x10    Cane abd 45 deg Standing x20 Standing x20 Standing x20  Standing x20    Cane ER  Supine 2x10 Supine 2x10 Standing 2x10  Supine 2x10 Supine 2x10    Cane extension  Standing x30    Slide up into IR x20 Standing x30    Slide up into IR x20      Sleeper stretch  x20                AAROM wall walks HEP x10 ea flexion and  scaption (both within pain-free range) Wall slides with towel AAROM 2x10  Wall slides with towel AAROM 2x10  Wall slides with towel standing AAROM 2x10    L UT stretch      3x30s   L levator scap stretch      3x30s                     Thera Activity         Patient education  Current restrictions       Posture education                                                      Modalities         Heat pre      L shoulder x5'     Insurance:  AMA/CMS Eval/ Re-eval POC expires Kylah Daughters #/ Referral # Total   Visits  Start date  Expiration date Extension  Visit limitation? PT only or  PT+OT?  Co-Insurance   [employer]  7/6 9/6 32/61 AUTH REQUIRED 99 7/6   BOMN PT $10 Co-pay                                                                 AUTH #:  Date 7/6 7/8 7/11 7/13 7/18 7/21 7/26 7/28 8/1 8/4     Visits  Authed: 99 Used 1 1 1 1 1 1 1 1 1 1      Remaining  98 97 96 [phone]

## 2022-08-09 ENCOUNTER — OFFICE VISIT (OUTPATIENT)
Dept: PHYSICAL THERAPY | Facility: CLINIC | Age: 72
End: 2022-08-09
Payer: COMMERCIAL

## 2022-08-09 DIAGNOSIS — S42.295D OTHER CLOSED NONDISPLACED FRACTURE OF PROXIMAL END OF LEFT HUMERUS WITH ROUTINE HEALING, SUBSEQUENT ENCOUNTER: ICD-10-CM

## 2022-08-09 DIAGNOSIS — S42.295D TRAUMATIC CLOSED NONDISPLACED FRACTURE OF ANATOMICAL NECK OF HUMERUS, LEFT, WITH ROUTINE HEALING, SUBSEQUENT ENCOUNTER: Primary | ICD-10-CM

## 2022-08-09 PROCEDURE — 97112 NEUROMUSCULAR REEDUCATION: CPT

## 2022-08-09 PROCEDURE — 97110 THERAPEUTIC EXERCISES: CPT

## 2022-08-09 PROCEDURE — 97140 MANUAL THERAPY 1/> REGIONS: CPT

## 2022-08-09 NOTE — PROGRESS NOTES
Daily Note      Today's date: 2022  Patient name: Shanika Clement  : 1950  MRN: [id number]  Referring provider: Chyna Cameron  Dx:   Encounter Diagnosis     ICD-10-CM    1  Traumatic closed nondisplaced fracture of anatomical neck of humerus, left, with routine healing, subsequent encounter  S42 295D    2  Other closed nondisplaced fracture of proximal end of left humerus with routine healing, subsequent encounter  S42 295D      Patient co-treated by Physical Therapy Student Ayesha Jennings, under my direct supervision  Subjective: Pt reports that her L shoulder is aggravated today, because she didn't sleep much last night due to having an episode of vertigo  Objective: See treatment diary below   Epley performed x2 - L only   Tequila Halpike performed - positive L only ISS nystagmus, negative R    Assessment: Pt tolerated treatment well  Patient demonstrates moderate restrictions in thoracic extension and thoracic rotation to the L side compared with the R side  Hypomobility noted during thoracic joint play at T4-T6 with patient noting tenderness upon palpation  Grade 3 joint mobilizations performed at T4-T6 with patient in supine to increase thoracic mobility  Due to patient reported vertigo over the last 48 hours, patient assessed for possible BPPV by Lori Rang, PT, DPT  Epley and Callum Hdz were performed, with patient reported decrease in dizziness after  Pt would benefit from continued PT to increase L shoulder ROM  Plan: Continue per plan of care            Date    Visit Number 7 8 9 10 11 6            Manual         GH mobs          AC mobs          Scap mobs          TPR L subscap, L lats with PROM x5'  L subscap, L lats with PROM x5'   L subscap, L lats with PROM x5'     STM L UT 5' L UT 5' L UT 5' L UT 5' L UT 5' L UT/levator scap x15'    Thoracic mobs     T4-T6 Grade 3 5'    Neuro Re-ed         Shoulder isos         Scap retract  YTB shoulder ext 2x10 RTB shoulder ext 2x10 RTB shoulder ext 2x10 YTB shoulder ext 2x10       TB Rows YTB 2x10 GTB 2x10  GTB 2x10              Serratus push up 2x10 2x10  2x10     Shrugs  x20       BPPV assessment performed     x30'     Thoracic extensions     Seated 2x10    Thoracic rotations     Standing 2x10 B    Thera Ex         Pulleys  HEP Seated flexion x 5'; seated scaption x 5' Standing flexion x 5'; seated scaption x 5' Standing flexion x 3', seated scaption x 3'  Standing flexion x 3', seated scaption x 3'     Shoulder PROM 5'  8'  8'  5'    Pendulums  HEP reviewed       Elbow flex/ext  HEP 2x10    DC to HEP     strength  HEP reviewed       Table slides  DC  DC 2x10  DC    Cane flex Supine 2x10 Supine 2x10   Supine 2x10 with half bolster under back     Cane abd 45 deg Standing x20 Standing x20 Standing x20  Standing x20    Cane ER  Supine 2x10 Supine 2x10 Standing 2x10  Supine 2x10     Cane extension  Standing x30    Slide up into IR x20 Standing x30    Slide up into IR x20      Sleeper stretch  x20                AAROM wall walks HEP x10 ea flexion and  scaption (both within pain-free range) Wall slides with towel AAROM 2x10  Wall slides with towel AAROM 2x10  Wall slides with towel standing AAROM 2x10    L UT stretch      3x30s   L levator scap stretch      3x30s                     Thera Activity         Patient education  Current restrictions       Posture education                                                      Modalities         Heat pre      L shoulder x5'     Insurance:  AMA/CMS Eval/ Re-eval POC expires Leandro Cortez #/ Referral # Total   Visits  Start date  Expiration date Extension  Visit limitation? PT only or  PT+OT?  Co-Insurance   [employer]  7/6 9/6 32/61 AUTH REQUIRED 99 7/6   BOMN PT $10 Co-pay                                                                 AUTH #:  Date 7/6 7/8 7/11 7/13 7/18 7/21 7/26 7/28 8/1 8/4 8/9    Visits  Authed: 99 Used 1 1 1 1 1 1 1 1 1 1 1     Remaining  98 97 96 95 [phone]

## 2022-08-10 ENCOUNTER — OFFICE VISIT (OUTPATIENT)
Dept: FAMILY MEDICINE CLINIC | Facility: CLINIC | Age: 72
End: 2022-08-10
Payer: COMMERCIAL

## 2022-08-10 VITALS
BODY MASS INDEX: 27.82 KG/M2 | OXYGEN SATURATION: 98 % | TEMPERATURE: 97.4 F | SYSTOLIC BLOOD PRESSURE: 128 MMHG | WEIGHT: 157 LBS | HEART RATE: 77 BPM | DIASTOLIC BLOOD PRESSURE: 70 MMHG | HEIGHT: 63 IN | RESPIRATION RATE: 18 BRPM

## 2022-08-10 DIAGNOSIS — I47.1 SVT (SUPRAVENTRICULAR TACHYCARDIA) (HCC): Primary | ICD-10-CM

## 2022-08-10 DIAGNOSIS — Z13.6 SCREENING FOR CARDIOVASCULAR CONDITION: ICD-10-CM

## 2022-08-10 DIAGNOSIS — E55.9 VITAMIN D DEFICIENCY: ICD-10-CM

## 2022-08-10 DIAGNOSIS — E03.9 ACQUIRED HYPOTHYROIDISM: ICD-10-CM

## 2022-08-10 DIAGNOSIS — D70.8 OTHER NEUTROPENIA (HCC): ICD-10-CM

## 2022-08-10 PROCEDURE — 3288F FALL RISK ASSESSMENT DOCD: CPT | Performed by: NURSE PRACTITIONER

## 2022-08-10 PROCEDURE — 1100F PTFALLS ASSESS-DOCD GE2>/YR: CPT | Performed by: NURSE PRACTITIONER

## 2022-08-10 PROCEDURE — 3725F SCREEN DEPRESSION PERFORMED: CPT | Performed by: NURSE PRACTITIONER

## 2022-08-10 PROCEDURE — 99214 OFFICE O/P EST MOD 30 MIN: CPT | Performed by: NURSE PRACTITIONER

## 2022-08-10 RX ORDER — MULTIVIT WITH MINERALS/LUTEIN
500 TABLET ORAL DAILY
COMMUNITY

## 2022-08-10 RX ORDER — MELATONIN
5000 DAILY
COMMUNITY

## 2022-08-10 NOTE — PROGRESS NOTES
Assessment/Plan:    1  SVT (supraventricular tachycardia) ([facility])  Assessment & Plan:  Stable, sees cardiology routinely      2  Acquired hypothyroidism  Assessment & Plan: Will check labs     Orders:  -     TSH, 3rd generation with Free T4 reflex; Future    3  Screening for cardiovascular condition  -     Lipid panel; Future  -     Comprehensive metabolic panel; Future    4  Other neutropenia ([address] )  Assessment & Plan:  Has been stable, will check CBC    Orders:  -     CBC and differential; Future    5  Vitamin D deficiency  Assessment & Plan:  She is requesting order for labs  She takes supplementation daily  Orders:  -     Vitamin D 25 hydroxy; Future            There are no Patient Instructions on file for this visit  No follow-ups on file  Subjective:      Patient ID: Dougie Caicedo is a 66 y o  female  Chief Complaint   Patient presents with    Dizziness     PT has suggested that Calcium and Vitamin D levels  should be done  [facility]       Here today for follow up  She had a recent episode of vertigo, was treated [facility] the [facility], which helped  She was told to have her calcium and Vitamin D levels checked  Doing well overall  Plans on going back to [place] in November  The following portions of the patient's history were reviewed and updated as appropriate: allergies, current medications, past family history, past medical history, past social history, past surgical history and problem list     Review of Systems   Constitutional: Negative  Respiratory: Negative  Cardiovascular: Negative  Gastrointestinal: Negative  Neurological: Positive for dizziness (resolved)  All other systems reviewed and are negative          Current Outpatient Medications   Medication Sig Dispense Refill    ascorbic acid (VITAMIN C) 250 mg tablet Take 500 mg by mouth daily      bimatoprost (LUMIGAN) 0 01 % ophthalmic drops Apply 1 drop to eye Daily      cholecalciferol (VITAMIN D3) 1,000 units tablet Take 5,000 Units by mouth daily      Jublia 10 % SOLN       levothyroxine 75 mcg tablet Take 1 tablet (75 mcg total) by mouth daily 30 tablet 0    Multiple Vitamin (multivitamin) tablet Take 1 tablet by mouth daily      bisoprolol (ZEBETA) 10 MG tablet Take 2 5 tablets (25 mg total) by mouth daily as needed (SVT) (Patient not taking: Reported on 6/8/2022) 30 tablet 0    Cholecalciferol (Vitamin D3) 50 MCG (2000 UT) TABS Take 2 tablets (4,000 Units total) by mouth daily (Patient taking differently: Take 2,000 Units by mouth daily 5000 daily) 90 tablet 3    meclizine (ANTIVERT) 25 mg tablet Take 1 tablet (25 mg total) by mouth 3 (three) times a day as needed for dizziness (Patient not taking: Reported on 6/29/2022) 30 tablet 0     No current facility-administered medications for this visit  Objective:    /70   Pulse 77   Temp (!) 97 4 °F (36 3 °C)   Resp 18   Ht 5' 3" (1 6 m)   Wt 71 2 kg (157 lb)   LMP 01/20/2000 (Approximate)   SpO2 98%   BMI 27 81 kg/m²        Physical Exam  Vitals and nursing note reviewed  Constitutional:       Appearance: Normal appearance  She is well-developed  Cardiovascular:      Rate and Rhythm: Normal rate and regular rhythm  Heart sounds: Normal heart sounds  No murmur heard  Pulmonary:      Effort: Pulmonary effort is normal       Breath sounds: Normal breath sounds  Skin:     General: Skin is warm and dry  Neurological:      Mental Status: She is alert     Psychiatric:         Mood and Affect: Mood normal          Behavior: Behavior normal                 PANCHO Gonzalez

## 2022-08-11 ENCOUNTER — OFFICE VISIT (OUTPATIENT)
Dept: PHYSICAL THERAPY | Facility: CLINIC | Age: 72
End: 2022-08-11
Payer: COMMERCIAL

## 2022-08-11 ENCOUNTER — APPOINTMENT (OUTPATIENT)
Dept: LAB | Facility: CLINIC | Age: 72
End: 2022-08-11
Payer: COMMERCIAL

## 2022-08-11 DIAGNOSIS — S42.295D TRAUMATIC CLOSED NONDISPLACED FRACTURE OF ANATOMICAL NECK OF HUMERUS, LEFT, WITH ROUTINE HEALING, SUBSEQUENT ENCOUNTER: Primary | ICD-10-CM

## 2022-08-11 DIAGNOSIS — S42.295D OTHER CLOSED NONDISPLACED FRACTURE OF PROXIMAL END OF LEFT HUMERUS WITH ROUTINE HEALING, SUBSEQUENT ENCOUNTER: ICD-10-CM

## 2022-08-11 DIAGNOSIS — Z13.6 SCREENING FOR CARDIOVASCULAR CONDITION: ICD-10-CM

## 2022-08-11 DIAGNOSIS — D70.8 OTHER NEUTROPENIA (HCC): ICD-10-CM

## 2022-08-11 DIAGNOSIS — E03.9 ACQUIRED HYPOTHYROIDISM: ICD-10-CM

## 2022-08-11 DIAGNOSIS — E55.9 VITAMIN D DEFICIENCY: ICD-10-CM

## 2022-08-11 LAB
25(OH)D3 SERPL-MCNC: 82 NG/ML (ref 30–100)
ALBUMIN SERPL BCP-MCNC: 3.5 G/DL (ref 3.5–5)
ALP SERPL-CCNC: 101 U/L (ref 46–116)
ALT SERPL W P-5'-P-CCNC: 28 U/L (ref 12–78)
ANION GAP SERPL CALCULATED.3IONS-SCNC: 5 MMOL/L (ref 4–13)
AST SERPL W P-5'-P-CCNC: 24 U/L (ref 5–45)
BASOPHILS # BLD AUTO: 0.03 THOUSANDS/ΜL (ref 0–0.1)
BASOPHILS NFR BLD AUTO: 1 % (ref 0–1)
BILIRUB SERPL-MCNC: 0.36 MG/DL (ref 0.2–1)
BUN SERPL-MCNC: 11 MG/DL (ref 5–25)
CALCIUM SERPL-MCNC: 9 MG/DL (ref 8.3–10.1)
CHLORIDE SERPL-SCNC: 110 MMOL/L (ref 96–108)
CHOLEST SERPL-MCNC: 210 MG/DL
CO2 SERPL-SCNC: 26 MMOL/L (ref 21–32)
CREAT SERPL-MCNC: 0.74 MG/DL (ref 0.6–1.3)
EOSINOPHIL # BLD AUTO: 0.34 THOUSAND/ΜL (ref 0–0.61)
EOSINOPHIL NFR BLD AUTO: 10 % (ref 0–6)
ERYTHROCYTE [DISTWIDTH] IN BLOOD BY AUTOMATED COUNT: 11.9 % (ref 11.6–15.1)
GFR SERPL CREATININE-BSD FRML MDRD: 80 ML/MIN/1.73SQ M
GLUCOSE P FAST SERPL-MCNC: 97 MG/DL (ref 65–99)
HCT VFR BLD AUTO: 39 % (ref 34.8–46.1)
HDLC SERPL-MCNC: 51 MG/DL
HGB BLD-MCNC: 13.2 G/DL (ref 11.5–15.4)
IMM GRANULOCYTES # BLD AUTO: 0 THOUSAND/UL (ref 0–0.2)
IMM GRANULOCYTES NFR BLD AUTO: 0 % (ref 0–2)
LDLC SERPL CALC-MCNC: 116 MG/DL (ref 0–100)
LYMPHOCYTES # BLD AUTO: 1.5 THOUSANDS/ΜL (ref 0.6–4.47)
LYMPHOCYTES NFR BLD AUTO: 43 % (ref 14–44)
MCH RBC QN AUTO: 32.1 PG (ref 26.8–34.3)
MCHC RBC AUTO-ENTMCNC: 33.8 G/DL (ref 31.4–37.4)
MCV RBC AUTO: 95 FL (ref 82–98)
MONOCYTES # BLD AUTO: 0.42 THOUSAND/ΜL (ref 0.17–1.22)
MONOCYTES NFR BLD AUTO: 12 % (ref 4–12)
NEUTROPHILS # BLD AUTO: 1.18 THOUSANDS/ΜL (ref 1.85–7.62)
NEUTS SEG NFR BLD AUTO: 34 % (ref 43–75)
NONHDLC SERPL-MCNC: 159 MG/DL
NRBC BLD AUTO-RTO: 0 /100 WBCS
PLATELET # BLD AUTO: 221 THOUSANDS/UL (ref 149–390)
PMV BLD AUTO: 10.4 FL (ref 8.9–12.7)
POTASSIUM SERPL-SCNC: 4 MMOL/L (ref 3.5–5.3)
PROT SERPL-MCNC: 6.8 G/DL (ref 6.4–8.4)
RBC # BLD AUTO: 4.11 MILLION/UL (ref 3.81–5.12)
SODIUM SERPL-SCNC: 141 MMOL/L (ref 135–147)
TRIGL SERPL-MCNC: 216 MG/DL
TSH SERPL DL<=0.05 MIU/L-ACNC: 3.76 UIU/ML (ref 0.45–4.5)
WBC # BLD AUTO: 3.47 THOUSAND/UL (ref 4.31–10.16)

## 2022-08-11 PROCEDURE — 82306 VITAMIN D 25 HYDROXY: CPT

## 2022-08-11 PROCEDURE — 84443 ASSAY THYROID STIM HORMONE: CPT

## 2022-08-11 PROCEDURE — 80053 COMPREHEN METABOLIC PANEL: CPT

## 2022-08-11 PROCEDURE — 97112 NEUROMUSCULAR REEDUCATION: CPT | Performed by: PHYSICAL THERAPIST

## 2022-08-11 PROCEDURE — 85025 COMPLETE CBC W/AUTO DIFF WBC: CPT

## 2022-08-11 PROCEDURE — 97110 THERAPEUTIC EXERCISES: CPT | Performed by: PHYSICAL THERAPIST

## 2022-08-11 PROCEDURE — 80061 LIPID PANEL: CPT

## 2022-08-11 PROCEDURE — 36415 COLL VENOUS BLD VENIPUNCTURE: CPT

## 2022-08-11 NOTE — PROGRESS NOTES
Daily Note      Today's date: 2022  Patient name: Aide Brown  : 1950  MRN: [id number]  Referring provider: Caty Jiménez  Dx:   Encounter Diagnosis     ICD-10-CM    1  Traumatic closed nondisplaced fracture of anatomical neck of humerus, left, with routine healing, subsequent encounter  S42 295D    2  Other closed nondisplaced fracture of proximal end of left humerus with routine healing, subsequent encounter  S42 295D      Patient co-treated by Physical Therapy Student Radha Marie, under my direct supervision  Subjective: Pt reports that her L shoulder is feeling sore because she vacuumed yesterday  She reports that her vertigo has dissipated since last session  Objective: See treatment diary below    Assessment: Pt tolerated treatment well  Patient uses UT compensation during L shoulder elevation, but demonstrates less compensation than previous sessions  Patient performed thoracic extensions and rotations in seated, and demonstrated more active shoulder ROM after  Mild excessive thoracic kyphosis noted, with thoracic mobilizations performed at T2-T4 to increase ease of L scapular movement and promote mobility of upper thoracic spine  HEP updated to reflect gains in thoracic mobility  Pt would benefit from continued PT to increase L shoulder ROM and scapular rhythm  Plan: Continue per plan of care            Date    Visit Number 7 8 9 10 11 12            Manual         GH mobs          AC mobs          Scap mobs          TPR L subscap, L lats with PROM x5'  L subscap, L lats with PROM x5'   L subscap, L lats with PROM x5'     STM L UT 5' L UT 5' L UT 5' L UT 5' L UT 5'    Thoracic mobs     T4-T6 Grade 3 5' T2-T4 Grade 3 5'   Neuro Re-ed         Shoulder isos         Scap retract  YTB shoulder ext 2x10 RTB shoulder ext 2x10 RTB shoulder ext 2x10 YTB shoulder ext 2x10    BTB ext 2x10 3s hold   TB Rows YTB 2x10 GTB 2x10  GTB 2x10  BTB ext 2x10 3s hold Serratus punch      Supine 2x10   Serratus push up 2x10 2x10  2x10     Shrugs  x20       BPPV assessment performed     x30'     Thoracic extensions     Seated 2x10 Seated 2x10   Thoracic rotations     Standing 2x10 B seated 2x10 B   Thera Ex         Pulleys  HEP Seated flexion x 5'; seated scaption x 5' Standing flexion x 5'; seated scaption x 5' Standing flexion x 3', seated scaption x 3'  Standing flexion x 3', seated scaption x 3'  Standing flexion x 3', seated scaption x 3'   Shoulder PROM 5'  8'  8'  5'    Pendulums  HEP reviewed       Elbow flex/ext  HEP 2x10    DC to HEP     strength  HEP reviewed       Table slides  DC  DC 2x10  DC    Cane flex Supine 2x10 Supine 2x10   Supine 2x10 with half bolster under back  Supine 2x10 with half bolster under back   Cane abd 45 deg Standing x20 Standing x20 Standing x20  Standing x20 Standing x20   Cane ER  Supine 2x10 Supine 2x10 Standing 2x10  Supine 2x10  Supine 2x10   Cane extension  Standing x30    Slide up into IR x20 Standing x30    Slide up into IR x20   Standing x30    Slide up into IR x20   Sleeper stretch  x20       Ball circles      CCW/CW   AAROM wall walks HEP x10 ea flexion and  scaption (both within pain-free range) Wall slides with towel AAROM 2x10  Wall slides with towel AAROM 2x10  Wall slides with towel standing AAROM 2x10    L UT stretch         L levator scap stretch                           Thera Activity         Patient education  Current restrictions       Posture education                                                      Modalities         Heat pre      L shoulder x5'     Insurance:  AMA/CMS Eval/ Re-eval POC expires Jo-Ann Barros #/ Referral # Total   Visits  Start date  Expiration date Extension  Visit limitation? PT only or  PT+OT?  Co-Insurance   [employer]  7/6 9/6 32/61 AUTH REQUIRED 99 7/6   BOMN PT $10 Co-pay                                                                 AUTH #:  Date 7/6 7/8 7/11 7/13 7/18 7/21 7/26 7/28 8/1 8/4 8/9 8/11   Visits  Authed: 99 Used 1 1 1 1 1 1 1 1 1 1 1 1    Remaining  98 97 96 95 [phone]

## 2022-08-16 ENCOUNTER — OFFICE VISIT (OUTPATIENT)
Dept: PHYSICAL THERAPY | Facility: CLINIC | Age: 72
End: 2022-08-16
Payer: COMMERCIAL

## 2022-08-16 ENCOUNTER — TELEPHONE (OUTPATIENT)
Dept: FAMILY MEDICINE CLINIC | Facility: CLINIC | Age: 72
End: 2022-08-16

## 2022-08-16 DIAGNOSIS — S42.295D OTHER CLOSED NONDISPLACED FRACTURE OF PROXIMAL END OF LEFT HUMERUS WITH ROUTINE HEALING, SUBSEQUENT ENCOUNTER: ICD-10-CM

## 2022-08-16 DIAGNOSIS — S42.295D TRAUMATIC CLOSED NONDISPLACED FRACTURE OF ANATOMICAL NECK OF HUMERUS, LEFT, WITH ROUTINE HEALING, SUBSEQUENT ENCOUNTER: Primary | ICD-10-CM

## 2022-08-16 PROCEDURE — 97110 THERAPEUTIC EXERCISES: CPT

## 2022-08-16 PROCEDURE — 97112 NEUROMUSCULAR REEDUCATION: CPT

## 2022-08-16 NOTE — PROGRESS NOTES
Daily Note      Today's date: 2022  Patient name: Robin Woodson  : 1950  MRN: [id number]  Referring provider: Leny Mayes  Dx:   Encounter Diagnosis     ICD-10-CM    1  Traumatic closed nondisplaced fracture of anatomical neck of humerus, left, with routine healing, subsequent encounter  S42 295D    2  Other closed nondisplaced fracture of proximal end of left humerus with routine healing, subsequent encounter  S42 295D      Patient co-treated by Physical Therapy Student Camden Simon, under my direct supervision  Subjective: Pt reports that her L shoulder muscles are feeling sore but she does not report pain  She reports that she was able to lift her heavy pot to cook, which she was previously unable to do  Objective: See treatment diary below    Assessment: Pt tolerated treatment well  Patient demonstrates more ROM with functional ER and active flexion than previous sessions  She reports fatigue in the L shoulder muscles during scapular stabilizing therex  Pt would benefit from continued PT to increase L shoulder ROM and strength  Plan: Continue per plan of care            Date    Visit Number 13 8 9 10 11 12            Manual         GH mobs          AC mobs          Scap mobs          TPR  L subscap, L lats with PROM x5'   L subscap, L lats with PROM x5'     STM  L UT 5' L UT 5' L UT 5' L UT 5'    Thoracic mobs     T4-T6 Grade 3 5' T2-T4 Grade 3 5'   Neuro Re-ed         Shoulder isos         Scap retract  BTB ext 2x10 3s hold RTB shoulder ext 2x10 RTB shoulder ext 2x10 YTB shoulder ext 2x10    BTB ext 2x10 3s hold   TB Rows BTB ext 2x10 3s hold GTB 2x10  GTB 2x10  BTB ext 2x10 3s hold   Serratus punch Supine 2x10     Supine 2x10   Serratus push up  2x10  2x10     Shrugs  x20       BPPV assessment performed     x30'     Thoracic extensions Seated 2x10    Seated 2x10 Seated 2x10   Thoracic rotations seated 2x10 B    Standing 2x10 B seated 2x10 B   Thera Ex Pulleys  Seated flexion x 5'; seated scaption x 5' Seated flexion x 5'; seated scaption x 5' Standing flexion x 5'; seated scaption x 5' Standing flexion x 3', seated scaption x 3'  Standing flexion x 3', seated scaption x 3'  Standing flexion x 3', seated scaption x 3'   Shoulder PROM 5'  8'  8'  5'    Pendulums  HEP reviewed       Elbow flex/ext  HEP 2x10    DC to HEP     strength  HEP reviewed       Table slides  DC  DC 2x10  DC    Cane flex  Supine 2x10   Supine 2x10 with half bolster under back  Supine 2x10 with half bolster under back   Cane abd 45 deg Standing x20 Standing x20 Standing x20  Standing x20 Standing x20   Cane ER   Supine 2x10 Standing 2x10  Supine 2x10  Supine 2x10   Cane extension Standing x30    Slide up into IR x20 Standing x30    Slide up into IR x20 Standing x30    Slide up into IR x20   Standing x30    Slide up into IR x20   Sleeper stretch  x20       Ball circles Up/down, lateral     CCW/CW   AAROM wall walks HEP x10 ea flexion and  scaption (both within pain-free range) Wall slides with towel AAROM 2x10  Wall slides with towel AAROM 2x10  Wall slides with towel standing AAROM 2x10    L UT stretch         L levator scap stretch         pec stretch On wall 10x10s                 Thera Activity         Patient education  Current restrictions       Posture education                                                      Modalities         Heat pre      L shoulder x5'     Insurance:  AMA/CMS Eval/ Re-eval POC expires Coretta Whitfield #/ Referral # Total   Visits  Start date  Expiration date Extension  Visit limitation? PT only or  PT+OT?  Co-Insurance   [employer]  7/6 9/6 32/61 AUTH REQUIRED 99 7/6   BOMN PT $10 Co-pay                                                                 AUTH #:  Date 7/6 7/8 7/11 7/13 7/18 7/21 7/26 7/28 8/1 8/4 8/9 8/11   Visits  Authed: 99 Used 1 1 1 1 1 1 1 1 1 1 1 1    Remaining  98 97 96 95 94 93 92 91 90 89 88 85            AUTH #:  Date 8/16 Visits  Authed: 99 Used 1               Remaining  84

## 2022-08-16 NOTE — TELEPHONE ENCOUNTER
Patient left a message on the test results hotline asking to speak to Trevon Blanco regarding her blood work results    Pete nAna LPN

## 2022-08-18 ENCOUNTER — OFFICE VISIT (OUTPATIENT)
Dept: PHYSICAL THERAPY | Facility: CLINIC | Age: 72
End: 2022-08-18
Payer: COMMERCIAL

## 2022-08-18 DIAGNOSIS — S42.295D TRAUMATIC CLOSED NONDISPLACED FRACTURE OF ANATOMICAL NECK OF HUMERUS, LEFT, WITH ROUTINE HEALING, SUBSEQUENT ENCOUNTER: Primary | ICD-10-CM

## 2022-08-18 DIAGNOSIS — S42.295D OTHER CLOSED NONDISPLACED FRACTURE OF PROXIMAL END OF LEFT HUMERUS WITH ROUTINE HEALING, SUBSEQUENT ENCOUNTER: ICD-10-CM

## 2022-08-18 PROCEDURE — 97110 THERAPEUTIC EXERCISES: CPT

## 2022-08-18 NOTE — PROGRESS NOTES
Daily Note      Today's date: 2022  Patient name: Juliette Aldrich  : 1950  MRN: [id number]  Referring provider: Chris Boogie  Dx:   Encounter Diagnosis     ICD-10-CM    1  Traumatic closed nondisplaced fracture of anatomical neck of humerus, left, with routine healing, subsequent encounter  S42 295D    2  Other closed nondisplaced fracture of proximal end of left humerus with routine healing, subsequent encounter  S42 295D      Patient co-treated by Physical Therapy Student Rosy Bo, under my direct supervision  Subjective: Pt repots 80% improvement in function since starting PT intervention  She has follow up with MD next week regarding shoulder  She continues to have increased pain with reaching behind her back  Objective: See treatment diary below    Assessment: Pt tolerated treatment well  Patient demonstrates improved IR BTB with addition of stretch  Increased pain also noted with stretch  Will continue to progress strength as able  Plan: Continue per plan of care            Date        Visit Number 13 14                Manual         GH mobs          AC mobs          Scap mobs          TPR  L subscap, L lats with PROM x5'       STM  L UT 5'       Thoracic mobs         Neuro Re-ed         Shoulder isos         Scap retract  BTB ext 2x10 3s hold RTB shoulder ext 2x10       TB Rows BTB ext 2x10 3s hold GTB 2x10       Serratus punch Supine 2x10        Serratus push up  2x10       Shrugs  x20       Tricep ext   2x10 7 5# CC       Thoracic extensions Seated 2x10 Seated 2x10       Thoracic rotations seated 2x10 B seated 2x10 B       Thera Ex         Pulleys  Seated flexion x 5'; seated scaption x 5' Seated flexion x 5'; seated scaption x 5'       Shoulder PROM 5'  8'       Cane flex  Supine 2x10       Cane abd 45 deg Standing x20 Standing x20       Cane ER   Supine 2x10       Cane extension Standing x30    Slide up into IR x20 Standing x30    Slide up into IR x20 Sleeper stretch  x20       Ball circles Up/down, lateral        AAROM wall walks HEP x10 ea flexion and  scaption (both within pain-free range)       L UT stretch         L levator scap stretch         pec stretch On wall 10x10s On wall 10x10s       tricep          Thera Activity         Patient education  Current restrictions       Posture education                                                      Modalities         Heat pre           Insurance:  AMA/CMS Eval/ Re-eval POC expires Coretta Whitfield #/ Referral # Total   Visits  Start date  Expiration date Extension  Visit limitation? PT only or  PT+OT?  Co-Insurance   [employer]  7/6 9/6 32/61 AUTH REQUIRED 99 7/6   BOMN PT $10 Co-pay                                                                 AUTH #:  Date 7/6 7/8 7/11 7/13 7/18 7/21 7/26 7/28 8/1 8/4 8/9 8/11   Visits  Authed: 99 Used 1 1 1 1 1 1 1 1 1 1 1 1    Remaining  98 97 96 95 94 93 92 91 90 89 88 85            AUTH #:  Date 8/16 8/18             Visits  Authed: 99 Used 1 1              Remaining  84 83

## 2022-08-23 ENCOUNTER — TELEPHONE (OUTPATIENT)
Dept: FAMILY MEDICINE CLINIC | Facility: CLINIC | Age: 72
End: 2022-08-23

## 2022-08-23 ENCOUNTER — OFFICE VISIT (OUTPATIENT)
Dept: PHYSICAL THERAPY | Facility: CLINIC | Age: 72
End: 2022-08-23
Payer: COMMERCIAL

## 2022-08-23 DIAGNOSIS — S42.295D TRAUMATIC CLOSED NONDISPLACED FRACTURE OF ANATOMICAL NECK OF HUMERUS, LEFT, WITH ROUTINE HEALING, SUBSEQUENT ENCOUNTER: Primary | ICD-10-CM

## 2022-08-23 DIAGNOSIS — S42.295D OTHER CLOSED NONDISPLACED FRACTURE OF PROXIMAL END OF LEFT HUMERUS WITH ROUTINE HEALING, SUBSEQUENT ENCOUNTER: ICD-10-CM

## 2022-08-23 PROCEDURE — 97110 THERAPEUTIC EXERCISES: CPT

## 2022-08-23 NOTE — PROGRESS NOTES
Daily Note      Today's date: 2022  Patient name: Demarcus Escobar  : 1950  MRN: [id number]  Referring provider: Courtney Villeda  Dx:   Encounter Diagnosis     ICD-10-CM    1  Traumatic closed nondisplaced fracture of anatomical neck of humerus, left, with routine healing, subsequent encounter  S42 295D    2  Other closed nondisplaced fracture of proximal end of left humerus with routine healing, subsequent encounter  S42 295D          Subjective: Pt denies pain of her shoulder today  She only has increased pain when she performs L shoulder ext  Objective: See treatment diary below    Assessment: Pt tolerated treatment well  Patient demonstrates improvements in L shoulder flexion ROM with active release of subscapularis and latissimus dorsi  Will continue to progress strength and range as able  Plan: Continue per plan of care            Date        Visit Number 13 14                Manual         GH mobs          AC mobs          Scap mobs          TPR  L subscap, L lats with PROM x5'       STM  L UT 5'       Thoracic mobs         Neuro Re-ed         Shoulder isos         Scap retract  BTB ext 2x10 3s hold RTB shoulder ext 2x10       TB Rows BTB ext 2x10 3s hold GTB 2x10       Serratus punch Supine 2x10        Serratus push up  2x10       Shrugs  x20       Tricep ext   2x10 7 5# CC       Thoracic extensions Seated 2x10 Seated 2x10       Thoracic rotations seated 2x10 B seated 2x10 B       Thera Ex         Pulleys  Seated flexion x 5'; seated scaption x 5' Seated flexion x 5'; seated scaption x 5'       Shoulder PROM 5'  8'       Cane flex  Supine 2x10       Cane abd 45 deg Standing x20 Standing x20       Cane ER   Supine 2x10       Cane extension Standing x30    Slide up into IR x20 Standing x30    Slide up into IR [facility] Up/down, lateral        AAROM wall walks HEP x10 ea flexion and  scaption (both within pain-free range)       Counter reach   2x10 2#        TB vicente IR/ER   2x10 GTB       pec stretch On wall 10x10s                 Thera Activity         Patient education         Posture education                                                      Modalities         Heat pre           Insurance:  AMA/CMS Eval/ Re-eval POC expires Susan Hi #/ Referral # Total   Visits  Start date  Expiration date Extension  Visit limitation? PT only or  PT+OT?  Co-Insurance   [employer]  7/6 9/6 32/61 AUTH REQUIRED 99 7/6   BOMN PT $10 Co-pay                                                                 AUTH #:  Date 7/6 7/8 7/11 7/13 7/18 7/21 7/26 7/28 8/1 8/4 8/9 8/11   Visits  Authed: 99 Used 1 1 1 1 1 1 1 1 1 1 1 1    Remaining  98 97 96 95 94 93 92 91 90 89 88 85            AUTH #:  Date 8/16 8/18             Visits  Authed: 99 Used 1 1              Remaining  84 83

## 2022-08-23 NOTE — TELEPHONE ENCOUNTER
[address] wants a copy of her labs placed at the   She didn't receive her results via mail so she will  a copy at the   Placed in patient  bin   No further action required [address] [place] [address]

## 2022-08-24 ENCOUNTER — OFFICE VISIT (OUTPATIENT)
Dept: OBGYN CLINIC | Facility: CLINIC | Age: 72
End: 2022-08-24
Payer: COMMERCIAL

## 2022-08-24 ENCOUNTER — APPOINTMENT (OUTPATIENT)
Dept: RADIOLOGY | Facility: CLINIC | Age: 72
End: 2022-08-24
Payer: COMMERCIAL

## 2022-08-24 VITALS — TEMPERATURE: 98.3 F

## 2022-08-24 DIAGNOSIS — S42.292D OTHER CLOSED DISPLACED FRACTURE OF PROXIMAL END OF LEFT HUMERUS WITH ROUTINE HEALING, SUBSEQUENT ENCOUNTER: Primary | ICD-10-CM

## 2022-08-24 DIAGNOSIS — S42.292A OTHER CLOSED DISPLACED FRACTURE OF PROXIMAL END OF LEFT HUMERUS, INITIAL ENCOUNTER: ICD-10-CM

## 2022-08-24 PROCEDURE — 1160F RVW MEDS BY RX/DR IN RCRD: CPT | Performed by: PHYSICIAN ASSISTANT

## 2022-08-24 PROCEDURE — 99213 OFFICE O/P EST LOW 20 MIN: CPT | Performed by: PHYSICIAN ASSISTANT

## 2022-08-24 PROCEDURE — 73030 X-RAY EXAM OF SHOULDER: CPT

## 2022-08-24 NOTE — PROGRESS NOTES
Assessment/Plan:  1  Other closed displaced fracture of proximal end of left humerus with routine healing, subsequent encounter  XR shoulder 2+ vw left       The patient is doing quite well and will continue physical therapy for the shoulder as she feels like she is still making progress with range of motion and strength  I did review her physical therapy notes today  If doing well in another 4 weeks, she will follow up as needed  No repeat x-rays are needed as the patient's fracture is well healed on x-rays today  She may gradually increase her activity as tolerated  Subjective:   Sonia Mccartney is a 66 y o  female who presents today for follow-up of her left shoulder, now about 3 months status post closed treatment of proximal humerus fracture  She is doing well and notes only mild discomfort about the shoulder  She notes improving range of motion and strength  She denies any paresthesias of the left upper extremity  Review of Systems   Constitutional: Negative  Negative for chills and fever  HENT: Negative  Negative for ear pain and sore throat  Eyes: Negative  Negative for pain and redness  Respiratory: Negative  Negative for shortness of breath and wheezing  Cardiovascular: Negative for chest pain and palpitations  Gastrointestinal: Negative  Negative for abdominal pain and blood in stool  Endocrine: Negative  Negative for polydipsia and polyuria  Genitourinary: Negative  Negative for difficulty urinating and dysuria  Musculoskeletal:        As noted in HPI   Skin: Negative  Negative for pallor and rash  Neurological: Negative  Negative for dizziness and numbness  Hematological: Negative  Negative for adenopathy  Does not bruise/bleed easily  Psychiatric/Behavioral: Negative  Negative for confusion and suicidal ideas           Past Medical History:   Diagnosis Date    Anxiety     Arthritis     Disease of thyroid gland     Glaucoma     bilateral    Osteoporosis     SVT [facility]supra[facility]ricular tachycardia) ([facility])     Vitamin D deficiency        Past Surgical History:   Procedure Laterality Date    BREAST LUMPECTOMY      CARDIAC ELECTROPHYSIOLOGY STUDY AND ABLATION       SECTION         Family History   Problem Relation Age of Onset    Arthritis Mother     Arthritis Father     Cancer Sister        Social History     Occupational History    Not on file   Tobacco Use    Smoking status: Never Smoker    Smokeless tobacco: Never Used   Vaping Use    Vaping Use: Never used   Substance and Sexual Activity    Alcohol use: No    Drug use: No    Sexual activity: Not on file         Current Outpatient Medications:     ascorbic acid (VITAMIN C) 250 mg tablet, Take 500 mg by mouth daily, Disp: , Rfl:     bimatoprost (LUMIGAN) 0 01 % ophthalmic drops, Apply 1 drop to eye Daily, Disp: , Rfl:     bisoprolol (ZEBETA) 10 MG tablet, Take 2 5 tablets (25 mg total) by mouth daily as needed (SVT) (Patient not taking: Reported on 2022), Disp: 30 tablet, Rfl: 0    cholecalciferol (VITAMIN D3) 1,000 units tablet, Take 5,000 Units by mouth daily, Disp: , Rfl:     Cholecalciferol (Vitamin D3) 50 MCG (2000 UT) TABS, Take 2 tablets (4,000 Units total) by mouth daily (Patient taking differently: Take 2,000 Units by mouth daily 5000 daily), Disp: 90 tablet, Rfl: 3    Jublia 10 % SOLN, , Disp: , Rfl:     levothyroxine 75 mcg tablet, Take 1 tablet (75 mcg total) by mouth daily, Disp: 30 tablet, Rfl: 0    meclizine (ANTIVERT) 25 mg tablet, Take 1 tablet (25 mg total) by mouth 3 (three) times a day as needed for dizziness (Patient not taking: Reported on 2022), Disp: 30 tablet, Rfl: 0    Multiple Vitamin (multivitamin) tablet, Take 1 tablet by mouth daily, Disp: , Rfl:     No Known Allergies    Objective:  Vitals:    22 1307   Temp: 98 3 °F (36 8 °C)       Left Shoulder Exam     Tenderness   The patient is experiencing no tenderness       Range of Motion External rotation: 40   Forward flexion: 160   Internal rotation 0 degrees: Sacrum     Muscle Strength   Abduction: 4/5   Internal rotation: 5/5   External rotation: 5/5     Tests   Drop arm: negative    Other   Erythema: absent  Sensation: normal  Pulse: present             Physical Exam  Constitutional:       General: She is not in acute distress  Appearance: She is well-developed  HENT:      Head: Normocephalic and atraumatic  Eyes:      General: No scleral icterus  Conjunctiva/sclera: Conjunctivae normal    Neck:      Vascular: No JVD  Cardiovascular:      Rate and Rhythm: Normal rate  Pulmonary:      Effort: Pulmonary effort is normal  No respiratory distress  Skin:     General: Skin is warm  Neurological:      Mental Status: She is alert and oriented to person, place, and time        Coordination: Coordination normal          I have personally reviewed pertinent films in PACS and my interpretation is as follows:  X-rays left shoulder:  Healed proximal humerus fracture

## 2022-08-25 ENCOUNTER — OFFICE VISIT (OUTPATIENT)
Dept: PHYSICAL THERAPY | Facility: CLINIC | Age: 72
End: 2022-08-25
Payer: COMMERCIAL

## 2022-08-25 DIAGNOSIS — S42.295D TRAUMATIC CLOSED NONDISPLACED FRACTURE OF ANATOMICAL NECK OF HUMERUS, LEFT, WITH ROUTINE HEALING, SUBSEQUENT ENCOUNTER: Primary | ICD-10-CM

## 2022-08-25 DIAGNOSIS — S42.295D OTHER CLOSED NONDISPLACED FRACTURE OF PROXIMAL END OF LEFT HUMERUS WITH ROUTINE HEALING, SUBSEQUENT ENCOUNTER: ICD-10-CM

## 2022-08-25 PROCEDURE — 97112 NEUROMUSCULAR REEDUCATION: CPT

## 2022-08-25 PROCEDURE — 97110 THERAPEUTIC EXERCISES: CPT

## 2022-08-25 NOTE — PROGRESS NOTES
Daily Note     Today's date: 2022  Patient name: Chris Little  : 1950  MRN: [id number]  Referring provider: Taylor Plata  Dx:   Encounter Diagnosis     ICD-10-CM    1  Traumatic closed nondisplaced fracture of anatomical neck of humerus, left, with routine healing, subsequent encounter  S42 295D    2  Other closed nondisplaced fracture of proximal end of left humerus with routine healing, subsequent encounter  S42 295D                   Subjective: Patient reported pain level today was good and she already vacuumed and was a 2/10 afterward  Objective: See treatment diary below      Assessment: Tolerated treatment well  Patient would benefit from continued PT in order to build strength and ROM in left shoulder  Patient is still challenged by abduction and extension  Patient did well with the addition of IR/ER with towel roll  Plan: Continue per plan of care          Date       Visit Number 13 14 15               Manual         GH mobs          AC mobs          Scap mobs          TPR  L subscap, L lats with PROM x5' PROM 5'      STM  L UT 5' L UT 5'      Thoracic mobs         Neuro Re-ed         Shoulder isos         Scap retract  BTB ext 2x10 3s hold RTB shoulder ext 2x10 RTB shoulder ext 2*10      TB Rows BTB ext 2x10 3s hold GTB 2x10 GTB 2*10      Serratus punch Supine 2x10        Serratus push up  2x10 2*10      Shrugs  x20       Tricep ext   2x10 7 5# CC 2*10 7 5# CC      Thoracic extensions Seated 2x10 Seated 2x10       Thoracic rotations seated 2x10 B seated 2x10 B       Thera Ex         Pulleys  Seated flexion x 5'; seated scaption x 5' Seated flexion x 5'; seated scaption x 5' Seated flexion x 5'  Seated scaption x 5'      Shoulder PROM 5'  8'       Cane flex  Supine 2x10 Supine 2*10      Cane abd 45 deg Standing x20 Standing x20 Standing 2*10      Cane ER   Supine 2x10       Cane extension Standing x30    Slide up into IR x20 Standing x30    Slide up into IR x20 Standing x 30   slide up into IR x 20      Ball circles Up/down, lateral        AAROM wall walks HEP x10 ea flexion and  scaption (both within pain-free range) X 10 ea flexion and scaption       Counter reach   2x10 2#        TB shouler IR/ER   2x10 GTB 2*10 GTB      pec stretch On wall 10x10s        Towel into wall   IR/ ER 2*10, 5 sec hold      Thera Activity         Patient education         Posture education                                                      Modalities         Heat pre           Insurance:  AMA/CMS Eval/ Re-eval POC expires Dione Tinoco #/ Referral # Total   Visits  Start date  Expiration date Extension  Visit limitation? PT only or  PT+OT?  Co-Insurance   [employer]  7/6 9/6 32/61 AUTH REQUIRED 99 7/6   BOMN PT $10 Co-pay                                                                 AUTH #:  Date 7/6 7/8 7/11 7/13 7/18 7/21 7/26 7/28 8/1 8/4 8/9 8/11   Visits  Authed: 99 Used 1 1 1 1 1 1 1 1 1 1 1 1    Remaining  98 97 96 95 94 93 92 91 90 89 88 85            AUTH #:  Date 8/16 8/18             Visits  Authed: 99 Used 1 1              Remaining  84 83

## 2022-08-30 ENCOUNTER — OFFICE VISIT (OUTPATIENT)
Dept: PHYSICAL THERAPY | Facility: CLINIC | Age: 72
End: 2022-08-30
Payer: COMMERCIAL

## 2022-08-30 DIAGNOSIS — S42.295D OTHER CLOSED NONDISPLACED FRACTURE OF PROXIMAL END OF LEFT HUMERUS WITH ROUTINE HEALING, SUBSEQUENT ENCOUNTER: ICD-10-CM

## 2022-08-30 DIAGNOSIS — S42.295D TRAUMATIC CLOSED NONDISPLACED FRACTURE OF ANATOMICAL NECK OF HUMERUS, LEFT, WITH ROUTINE HEALING, SUBSEQUENT ENCOUNTER: Primary | ICD-10-CM

## 2022-08-30 PROCEDURE — 97110 THERAPEUTIC EXERCISES: CPT

## 2022-08-30 PROCEDURE — 97112 NEUROMUSCULAR REEDUCATION: CPT

## 2022-08-30 NOTE — PROGRESS NOTES
Daily Note     Today's date: 2022  Patient name: Glendy Bird  : 1950  MRN: [id number]  Referring provider: Emery Nettles  Dx:   Encounter Diagnosis     ICD-10-CM    1  Traumatic closed nondisplaced fracture of anatomical neck of humerus, left, with routine healing, subsequent encounter  S42 295D    2  Other closed nondisplaced fracture of proximal end of left humerus with routine healing, subsequent encounter  S42 295D        Start Time: 930  Stop Time: 1015  Total time in clinic (min): 45 minutes    Subjective: Patient reports into clinic with a 1/10 discomfort       Objective: See treatment diary below      Assessment: Tolerated treatment well  Patient would benefit from continued PT in order to continue building strength and ROM of left shoulder  Plan: Continue per plan of care          Date      Visit Number 13 14 15 16              Manual         GH mobs          AC mobs          Scap mobs          TPR  L subscap, L lats with PROM x5' PROM 5' PROM 8'     STM  L UT 5' L UT 5'      Thoracic mobs         Neuro Re-ed         Shoulder isos         Scap retract  BTB ext 2x10 3s hold RTB shoulder ext 2x10 RTB shoulder ext 2*10 RTB shoulder ext 20x + walkout 20x     TB Rows BTB ext 2x10 3s hold GTB 2x10 GTB 2*10 GTB 20x     Serratus punch Supine 2x10        Serratus push up  2x10 2*10 2*10 off table      Shrugs  x20  X 20      Tricep ext   2x10 7 5# CC 2*10 7 5# CC      Thoracic extensions Seated 2x10 Seated 2x10       Thoracic rotations seated 2x10 B seated 2x10 B       Thera Ex         Pulleys  Seated flexion x 5'; seated scaption x 5' Seated flexion x 5'; seated scaption x 5' Seated flexion x 5'  Seated scaption x 5' Seated flexion x 5'  Seated scaption x 5'     Shoulder PROM 5'  8'       Cane flex  Supine 2x10 Supine 2*10 Supine 2*10     Cane abd 45 deg Standing x20 Standing x20 Standing 2*10      Cane ER   Supine 2x10  Supine 2*10     Cane extension Standing x30    Slide up into IR x20 Standing x30    Slide up into IR x20 Standing x 30   slide up into IR x 20 Standing x 30  Slide up into IR x 20     Ball circles Up/down, lateral        AAROM wall walks HEP x10 ea flexion and  scaption (both within pain-free range) X 10 ea flexion and scaption  X 10 ea flexion and scaption     Counter reach   2x10 2#        TB shouler IR/ER   2x10 GTB 2*10 GTB No monies green loop 20 x     pec stretch On wall 10x10s        Towel into wall   IR/ ER 2*10, 5 sec hold Ball rolls CW, CCW 20x     Thera Activity         Patient education         Posture education                                                      Modalities         Heat pre           Insurance:  AMA/CMS Eval/ Re-eval POC expires Frewoody Leiva #/ Referral # Total   Visits  Start date  Expiration date Extension  Visit limitation? PT only or  PT+OT?  Co-Insurance   [employer]  7/6 9/6 32/61 AUTH REQUIRED 99 7/6   BOMN PT $10 Co-pay                                                                 AUTH #:  Date 7/6 7/8 7/11 7/13 7/18 7/21 7/26 7/28 8/1 8/4 8/9 8/11   Visits  Authed: 99 Used 1 1 1 1 1 1 1 1 1 1 1 1    Remaining  98 97 96 95 94 93 92 91 90 89 88 85            AUTH #:  Date 8/16 8/18             Visits  Authed: 99 Used 1 1              Remaining  84 83

## 2022-09-01 ENCOUNTER — OFFICE VISIT (OUTPATIENT)
Dept: PHYSICAL THERAPY | Facility: CLINIC | Age: 72
End: 2022-09-01
Payer: COMMERCIAL

## 2022-09-01 DIAGNOSIS — S42.295D TRAUMATIC CLOSED NONDISPLACED FRACTURE OF ANATOMICAL NECK OF HUMERUS, LEFT, WITH ROUTINE HEALING, SUBSEQUENT ENCOUNTER: Primary | ICD-10-CM

## 2022-09-01 DIAGNOSIS — S42.295D OTHER CLOSED NONDISPLACED FRACTURE OF PROXIMAL END OF LEFT HUMERUS WITH ROUTINE HEALING, SUBSEQUENT ENCOUNTER: ICD-10-CM

## 2022-09-01 PROCEDURE — 97112 NEUROMUSCULAR REEDUCATION: CPT

## 2022-09-01 PROCEDURE — 97110 THERAPEUTIC EXERCISES: CPT

## 2022-09-01 NOTE — PROGRESS NOTES
Daily Note     Today's date: 2022  Patient name: Geraldina Libman  : 1950  MRN: [id number]  Referring provider: Olga Campos  Dx:   Encounter Diagnosis     ICD-10-CM    1  Traumatic closed nondisplaced fracture of anatomical neck of humerus, left, with routine healing, subsequent encounter  S42 295D    2  Other closed nondisplaced fracture of proximal end of left humerus with routine healing, subsequent encounter  S42 295D        Start Time: 1015  Stop Time: 1100  Total time in clinic (min): 45 minutes    Subjective: Patient reports no complaints      Objective: See treatment diary below      Assessment: Tolerated treatment well  Patient would benefit from continued PT   Pt demo fatigue with exercises, however ROM and strength is improving  Plan: Continue per plan of care          Date     Visit Number 13 14 15 16 17             Manual         GH mobs          AC mobs          Scap mobs          TPR  L subscap, L lats with PROM x5' PROM 5' PROM 8' 5' PROM    STM  L UT 5' L UT 5'      Thoracic mobs         Neuro Re-ed         Shoulder isos         Scap retract  BTB ext 2x10 3s hold RTB shoulder ext 2x10 RTB shoulder ext 2*10 RTB shoulder ext 20x + walkout 20x RTB shoulder ext     TB Rows BTB ext 2x10 3s hold GTB 2x10 GTB 2*10 GTB 20x GTB 20x    Serratus punch Supine 2x10        Serratus push up  2x10 2*10 2*10 off table      Shrugs  x20  X 20  20x    Tricep ext   2x10 7 5# CC 2*10 7 5# CC      Thoracic extensions Seated 2x10 Seated 2x10       Thoracic rotations seated 2x10 B seated 2x10 B       Thera Ex         Pulleys  Seated flexion x 5'; seated scaption x 5' Seated flexion x 5'; seated scaption x 5' Seated flexion x 5'  Seated scaption x 5' Seated flexion x 5'  Seated scaption x 5' Seated flexion x 5'  Seated scaption x 5'    Shoulder PROM 5'  8'       Cane flex  Supine 2x10 Supine 2*10 Supine 2*10 Supine 20x    Cane abd 45 deg Standing x20 Standing x20 Standing 2*10      Cane ER   Supine 2x10  Supine 2*10 Supine 20x    Cane extension Standing x30    Slide up into IR x20 Standing x30    Slide up into IR x20 Standing x 30   slide up into IR x 20 Standing x 30  Slide up into IR x 20 Standing x 30  Slide up into IR x 20    Ball circles Up/down, lateral        AAROM wall walks HEP x10 ea flexion and  scaption (both within pain-free range) X 10 ea flexion and scaption  X 10 ea flexion and scaption X 15 ea flexion and scaption    Counter reach   2x10 2#        TB shouler IR/ER   2x10 GTB 2*10 GTB No monies green loop 20 x No monies green loop 20 x    pec stretch On wall 10x10s        Towel into wall   IR/ ER 2*10, 5 sec hold Ball rolls CW, CCW 20x Ball rolls CW, CCW 20x    Thera Activity         Patient education         Posture education                                                      Modalities         Heat pre           Insurance:  AMA/CMS Eval/ Re-eval POC expires Doc Flatten #/ Referral # Total   Visits  Start date  Expiration date Extension  Visit limitation? PT only or  PT+OT?  Co-Insurance   [employer]  7/6 9/6 32/61 AUTH REQUIRED 99 7/6   BOMN PT $10 Co-pay                                                                 AUTH #:  Date 7/6 7/8 7/11 7/13 7/18 7/21 7/26 7/28 8/1 8/4 8/9 8/11   Visits  Authed: 99 Used 1 1 1 1 1 1 1 1 1 1 1 1    Remaining  98 97 96 95 94 93 92 91 90 89 88 85            AUTH #:  Date 8/16 8/18 8/25 8/30           Visits  Authed: 99 Used 1 1              Remaining  84 83

## 2022-09-06 ENCOUNTER — OFFICE VISIT (OUTPATIENT)
Dept: PHYSICAL THERAPY | Facility: CLINIC | Age: 72
End: 2022-09-06
Payer: COMMERCIAL

## 2022-09-06 DIAGNOSIS — S42.295D OTHER CLOSED NONDISPLACED FRACTURE OF PROXIMAL END OF LEFT HUMERUS WITH ROUTINE HEALING, SUBSEQUENT ENCOUNTER: ICD-10-CM

## 2022-09-06 DIAGNOSIS — S42.295D TRAUMATIC CLOSED NONDISPLACED FRACTURE OF ANATOMICAL NECK OF HUMERUS, LEFT, WITH ROUTINE HEALING, SUBSEQUENT ENCOUNTER: Primary | ICD-10-CM

## 2022-09-06 PROCEDURE — 97110 THERAPEUTIC EXERCISES: CPT

## 2022-09-06 PROCEDURE — 97112 NEUROMUSCULAR REEDUCATION: CPT

## 2022-09-06 NOTE — PROGRESS NOTES
PT Re-Evaluation     Today's date: 2022  Patient name: Michelle Olivier  : 1950  MRN: [id number]  Referring provider: Jeremiah Muniz  Dx:   Encounter Diagnosis     ICD-10-CM    1  Traumatic closed nondisplaced fracture of anatomical neck of humerus, left, with routine healing, subsequent encounter  S42 295D    2  Other closed nondisplaced fracture of proximal end of left humerus with routine healing, subsequent encounter  S42 295D          Assessment  Assessment details: Patient is a 66 y o  Female who presents to skilled outpatient PT with referring diagnosis of other closed nondisplaced fracture of proximal end of left humerus with routine healing, subsequent encounter  Patient demonstrates improvements in ROM, strength, and overall functional mobility since starting PT intervention  Patient continues to struggle with reaching behind her back and lifting objects of weight due to lack of motion and lack of strength, respectively  Patient's notes fatigue of L UE when performing strength training of L UE  Patient would continue to benefit from skilled PT intervention to address the limitations noted above  Impairments: Abnormal muscle tone, Abnormal or restricted ROM, Impaired physical strength, Poor posture, Pain with function and Scapular dyskinesis  Understanding of Dx/Px/POC: Excellent  Prognosis: Excellent      Plan  Patient would benefit from: PT Eval and Skilled PT  Planned modality interventions: Biofeedback, Cryotherapy, TENS, Thermotherapy: Hydrocollator Packs and Traction  Planned therapy interventions:  Body mechanics training, Functional ROM exercises, HEP, Joint mobilization, Manual therapy, Neuromuscular re-education, Patient education, Postural training, Strengthening, Stretching, Therapeutic activities, Therapeutic exercises, Therapeutic training, Transfer training and Activity modification  Frequency: 2x/wk  Duration in weeks: 8  Plan of Care beginning date: 22  Plan of Care expiration date: 8 weeks - 22  Treatment plan discussed with: Patient       Goals  Short Term Goals (4 weeks):  MET as of   - Patient will be independent in basic HEP 2-3 weeks  - Patient will have 0/10 pain at rest  - Patient will demonstrate >1/3 improvement in MMT grade as applicable  - Patient functional goal: Patient will floss teeth independently  Long Term Goals (8 weeks): MET as of   - Patient will be independent in a comprehensive home exercise program  - Patient FOTO score will improve to 61/100  - Patient will self-report >75% improvement in function  - Patient functional goal: Patient will resume all self care activities independently and pain free  Updated goals per :   - Patient will be able to mop floors with <3/10 pain (met as of 2022)   - Patient will be able to lift 5lbs (the weight of 2 dinner plates) overhead with <2/10 pain (progressing towards as of 2022)   - Patient will be able to open a door with her L arm with <2/10 pain (met as of 2022)     Subjective    History of Present Illness  - Mechanism of injury: Patient reports 1/10 pain in her shoulder today  She reports 90% improvement in function since starting PT intervention  She had a fall this weekend, but denies injury to her arm  She is able to mop, move up in the bed with her L UE, cut her hair, and curl her hair with no increased pain or difficulty       - Functional limitations: reaching behind back, lifting objects of weight, housework     - Patient goals: mopping, reaching overhead while holding dishes, and open a door with her L arm    Pain  - Current pain ratin/10  - At best pain ratin/10  - At worst pain ratin/10  - Location: L shoulder   - Alleviating factors: ice, Tylenol     Social Support  - Steps to enter house: 12  - Stairs in house: 3   - Bedroom/bathroom set up: second floor  - Lives in: multi-level house   - Lives with:        Objective   UE MMT  - L Shoulder Flexion: 4/5   R Shoulder Flexion: 5/5  - L Shoulder Extension: 4/5   R Shoulder Extension: 5/5  - L Shoulder Abduction: 4/5   R Shoulder Abduction: 5/5  - L Shoulder  Adduction: 4/5   R Shoulder  Adduction: 5/5  - L Shoulder  IR: 4/5    R Shoulder  IR: 5/5  - L Shoulder  ER: 4/5    R Shoulder  ER: 5/5  - L Elbow Extension: 4/5   R Elbow Extension: 5/5   - L Elbow Flexion: 5/5    R Elbow Flexion: 5/5  - L Wrist Flexion: 5/5    R Wrist Flexion: 5/5  - L Wrist Extension: 5/5   R Wrist Extension: 5/5    Sensation  - Light touch: intact     Palpation   -TTP L biceps tendon origin, L proximal humerus, L UT     Shoulder AROM L R   Flexion 160 deg 180 deg   Extension 40 deg 40 deg   ER 70 deg 70 deg   IR 70 deg 70 deg   Abduction 160 deg 180 deg   Functional IR BTB C7 C7   Functional ER BTH L1 T12     Shoulder PROM L R   Flexion 165 deg 180 deg   Extension 40 deg 40 deg   ER 90 deg 90 deg   IR 75 deg 70 deg   Abduction 160 deg 180 deg     R handgrip: 32 lbs   L handgrip: 22 lbs      Precautions: L humeral fracture 6/3/2022 (wear sling and restore ROM as per MD recommendation)     Past Medical History:   Diagnosis Date    Anxiety     Arthritis     Disease of thyroid gland     Glaucoma     bilateral    Osteoporosis     SVT (supraventricular tachycardia) (HCC)     Vitamin D deficiency          Date 8/16 8/18 8/25 8/30 9/1 9/6   Visit Number 13 14 15 16 17 18            Manual         GH mobs          AC mobs          Scap mobs          TPR  L subscap, L lats with PROM x5' PROM 5' PROM 8' 5' PROM    STM  L UT 5' L UT 5'      Thoracic mobs         Neuro Re-ed         Shoulder isos         Scap retract  BTB ext 2x10 3s hold RTB shoulder ext 2x10 RTB shoulder ext 2*10 RTB shoulder ext 20x + walkout 20x RTB shoulder ext  GTB 2x10    TB Rows BTB ext 2x10 3s hold GTB 2x10 GTB 2*10 GTB 20x GTB 20x GTB 2x10    Serratus punch Supine 2x10        Serratus push up  2x10 2*10 2*10 off table      Shrugs  x20  X 20  20x    Tricep ext 2x10 7 5# CC 2*10 7 5# CC      Thoracic extensions Seated 2x10 Seated 2x10       Thoracic rotations seated 2x10 B seated 2x10 B       Thera Ex         Pulleys  Seated flexion x 5'; seated scaption x 5' Seated flexion x 5'; seated scaption x 5' Seated flexion x 5'  Seated scaption x 5' Seated flexion x 5'  Seated scaption x 5' Seated flexion x 5'  Seated scaption x 5' Seated flexion x 5'     Shoulder PROM 5'  8'    5'   Cane flex  Supine 2x10 Supine 2*10 Supine 2*10 Supine 20x Supine 20x   Cane abd 45 deg Standing x20 Standing x20 Standing 2*10      Cane ER   Supine 2x10  Supine 2*10 Supine 20x Supine 20x   Cane extension Standing x30    Slide up into IR x20 Standing x30    Slide up into IR x20 Standing x 30   slide up into IR x 20 Standing x 30  Slide up into IR x 20 Standing x 30  Slide up into IR x 20    Ball circles Up/down, lateral        AAROM wall walks HEP x10 ea flexion and  scaption (both within pain-free range) X 10 ea flexion and scaption  X 10 ea flexion and scaption X 15 ea flexion and scaption    Counter reach   2x10 2#     2x10 3#   TB shouler IR/ER   2x10 GTB 2*10 GTB No monies green loop 20 x No monies green loop 20 x GTB 2x10    pec stretch On wall 10x10s        Towel into wall   IR/ ER 2*10, 5 sec hold Ball rolls CW, CCW 20x Ball rolls CW, CCW 20x    AROM shoulder flex, scap, abd      x10 each, 2#    Tricep ext CC      2x10 7 5#            Thera Activity         Patient education         Posture education         Re-evaluation performed       x7'                                        Modalities         Heat pre           Insurance:  AMA/CMS Eval/ Re-eval POC expires Carroll Leiva #/ Referral # Total   Visits  Start date  Expiration date Extension  Visit limitation? PT only or  PT+OT?  Co-Insurance   [employer]  7/6 9/6 32/61 AUTH REQUIRED 99 7/6   BOMN PT $10 Co-pay                                                                 AUTH #:  Date 7/6 7/8 7/11 7/13 7/18 7/21 7/26 7/28 8/1 8/4 8/9 8/11 Visits  Authed: 99 Used 1 1 1 1 1 1 1 1 1 1 1 1    Remaining  98 97 96 95 94 93 92 91 90 89 88 85            AUTH #:  Date 8/16 8/18 8/25 8/30 9/6          Visits  Authed: 99 Used 1 1 1 1 1           Remaining  84 83 [phone] 80

## 2022-09-08 ENCOUNTER — OFFICE VISIT (OUTPATIENT)
Dept: PHYSICAL THERAPY | Facility: CLINIC | Age: 72
End: 2022-09-08
Payer: COMMERCIAL

## 2022-09-08 DIAGNOSIS — S42.295D TRAUMATIC CLOSED NONDISPLACED FRACTURE OF ANATOMICAL NECK OF HUMERUS, LEFT, WITH ROUTINE HEALING, SUBSEQUENT ENCOUNTER: Primary | ICD-10-CM

## 2022-09-08 DIAGNOSIS — S42.295D OTHER CLOSED NONDISPLACED FRACTURE OF PROXIMAL END OF LEFT HUMERUS WITH ROUTINE HEALING, SUBSEQUENT ENCOUNTER: ICD-10-CM

## 2022-09-08 PROCEDURE — 97112 NEUROMUSCULAR REEDUCATION: CPT

## 2022-09-08 PROCEDURE — 97110 THERAPEUTIC EXERCISES: CPT

## 2022-09-08 NOTE — PROGRESS NOTES
Daily Note     Today's date: 2022  Patient name: Demarcus Escobar  : 1950  MRN: [id number]  Referring provider: Courtney Villeda  Dx:   Encounter Diagnosis     ICD-10-CM    1  Traumatic closed nondisplaced fracture of anatomical neck of humerus, left, with routine healing, subsequent encounter  S42 295D    2  Other closed nondisplaced fracture of proximal end of left humerus with routine healing, subsequent encounter  S42 295D        Start Time: 930  Stop Time: 1015  Total time in clinic (min): 45 minutes    Subjective: Patient reported that she lifted a pot and scrubbed her floors  The only thing bothering her is trying to reach behind her back  Objective: See treatment diary below      Assessment: Tolerated treatment well  Patient would benefit from continued PT in order to continue strengthening left shoulder girdle and increasing ROM  Patient needed tactile and verbal cueing to avoid shoulder hiking during standing I, T, Y  Plan: Continue per plan of care        Date    Visit Number 13 14 15 16 17 18            Manual         GH mobs          AC mobs          Scap mobs          TPR  L subscap, L lats with PROM x5' PROM 5' PROM 8' 5' PROM 5' PROM   STM  L UT 5' L UT 5'      Thoracic mobs         Neuro Re-ed         Shoulder isos         Scap retract  BTB ext 2x10 3s hold RTB shoulder ext 2x10 RTB shoulder ext 2*10 RTB shoulder ext 20x + walkout 20x RTB shoulder ext  BTB shoulder ext 30x   TB Rows BTB ext 2x10 3s hold GTB 2x10 GTB 2*10 GTB 20x GTB 20x BTB 30x   Triceps 30x BTB   Serratus punch Supine 2x10     Standing I, T, Y 1*10 with 1#, 1 x 10 with no weight but therapist resisting shoulder hike   Serratus push up  2x10 2*10 2*10 off table      Shrugs  x20  X 20  20x    Tricep ext   2x10 7 5# CC 2*10 7 5# CC      Thoracic extensions Seated 2x10 Seated 2x10       Thoracic rotations seated 2x10 B seated 2x10 B       Thera Ex         Pulleys  Seated flexion x 5'; seated scaption x 5' Seated flexion x 5'; seated scaption x 5' Seated flexion x 5'  Seated scaption x 5' Seated flexion x 5'  Seated scaption x 5' Seated flexion x 5'  Seated scaption x 5' Seated flexion x 5' seated scaption  X 5'   Shoulder PROM 5'  8'       Cane flex  Supine 2x10 Supine 2*10 Supine 2*10 Supine 20x    Cane abd 45 deg Standing x20 Standing x20 Standing 2*10      Cane ER   Supine 2x10  Supine 2*10 Supine 20x    Cane extension Standing x30    Slide up into IR x20 Standing x30    Slide up into IR x20 Standing x 30   slide up into IR x 20 Standing x 30  Slide up into IR x 20 Standing x 30  Slide up into IR x 20 Standing x 30x   Ball circles Up/down, lateral     Standing IR with SOS 20x 5 sec hold   AAROM wall walks HEP x10 ea flexion and  scaption (both within pain-free range) X 10 ea flexion and scaption  X 10 ea flexion and scaption X 15 ea flexion and scaption    Counter reach   2x10 2#        TB shouler IR/ER   2x10 GTB 2*10 GTB No monies green loop 20 x No monies green loop 20 x No monies blue loop 20x   pec stretch On wall 10x10s        Towel into wall   IR/ ER 2*10, 5 sec hold Ball rolls CW, CCW 20x Ball rolls CW, CCW 20x    Thera Activity         Patient education         Posture education                                                      Modalities         Heat pre           Insurance:  AMA/CMS Eval/ Re-eval POC expires Sapphire Erps #/ Referral # Total   Visits  Start date  Expiration date Extension  Visit limitation? PT only or  PT+OT?  Co-Insurance   [employer]  7/6 9/6 32/61 AUTH REQUIRED 99 7/6   BOMN PT $10 Co-pay                                                                 AUTH #:  Date 7/6 7/8 7/11 7/13 7/18 7/21 7/26 7/28 8/1 8/4 8/9 8/11   Visits  Authed: 99 Used 1 1 1 1 1 1 1 1 1 1 1 1    Remaining  98 97 96 95 94 93 92 91 90 89 88 85            AUTH #:  Date 8/16 8/18 8/25 8/30 9/8          Visits  Authed: 99 Used 1 1              Remaining  84 83

## 2022-09-13 ENCOUNTER — OFFICE VISIT (OUTPATIENT)
Dept: PHYSICAL THERAPY | Facility: CLINIC | Age: 72
End: 2022-09-13
Payer: COMMERCIAL

## 2022-09-13 DIAGNOSIS — S42.295D TRAUMATIC CLOSED NONDISPLACED FRACTURE OF ANATOMICAL NECK OF HUMERUS, LEFT, WITH ROUTINE HEALING, SUBSEQUENT ENCOUNTER: Primary | ICD-10-CM

## 2022-09-13 DIAGNOSIS — S42.295D OTHER CLOSED NONDISPLACED FRACTURE OF PROXIMAL END OF LEFT HUMERUS WITH ROUTINE HEALING, SUBSEQUENT ENCOUNTER: ICD-10-CM

## 2022-09-13 PROCEDURE — 97112 NEUROMUSCULAR REEDUCATION: CPT

## 2022-09-13 PROCEDURE — 97110 THERAPEUTIC EXERCISES: CPT

## 2022-09-13 NOTE — PROGRESS NOTES
Daily Note     Today's date: 2022  Patient name: Michelle Olivier  : 1950  MRN: [id number]  Referring provider: Jeremiah Muniz  Dx:   Encounter Diagnosis     ICD-10-CM    1  Traumatic closed nondisplaced fracture of anatomical neck of humerus, left, with routine healing, subsequent encounter  S42 295D    2  Other closed nondisplaced fracture of proximal end of left humerus with routine healing, subsequent encounter  S42 295D        Start Time: 930  Stop Time: 1015  Total time in clinic (min): 45 minutes    Subjective: Patient reports ease with IADLs  She cannot lift the large pot out of the sink  She was able to mop the floor, but had increased pain afterwards  She took Tylenol to help with the pain  Objective: See treatment diary below      Assessment: Tolerated treatment well  Patient is progressing well with ROM and strength of her L shoulder  Added new activities today, with no adverse effects  Significant limitations in L shoulder abduction and ER noted with standing flies  Patient would benefit from continued PT i      Plan: Continue per plan of care        Date    Visit Number 14 15 16 17 18 19            Manual         GH mobs          AC mobs          Scap mobs          TPR L subscap, L lats with PROM x5' PROM 5' PROM 8' 5' PROM 5' PROM    STM L UT 5' L UT 5'       Thoracic mobs         Neuro Re-ed         Shoulder isos         Scap retract  RTB shoulder ext 2x10 RTB shoulder ext 2*10 RTB shoulder ext 20x + walkout 20x RTB shoulder ext  BTB shoulder ext 30x BTB shoulder ext 30x   TB Rows GTB 2x10 GTB 2*10 GTB 20x GTB 20x BTB 30x   Triceps 30x BTB BTB 30x   Triceps 30x BTB   Serratus punch     Standing I, T, Y 1*10 with 1#, 1 x 10 with no weight but therapist resisting shoulder hike Standing I, Y, T 4# weight     Bent over rows 2x10 4#    Serratus push up 2x10 2*10 2*10 off table    Alternating shoulder taps 2x10    Shrugs x20  X 20  20x     Tricep ext 2x10 7 5# CC 2*10 7 5# CC       Thoracic extensions Seated 2x10        Thoracic rotations seated 2x10 B        Thera Ex         Pulleys  Seated flexion x 5'; seated scaption x 5' Seated flexion x 5'  Seated scaption x 5' Seated flexion x 5'  Seated scaption x 5' Seated flexion x 5'  Seated scaption x 5' Seated flexion x 5' seated scaption  X 5' Seated flexion x 5'   Shoulder PROM 8'        Cane flex Supine 2x10 Supine 2*10 Supine 2*10 Supine 20x     Cane abd 45 deg Standing x20 Standing 2*10       Cane ER  Supine 2x10  Supine 2*10 Supine 20x     Cane extension Standing x30    Slide up into IR x20 Standing x 30   slide up into IR x 20 Standing x 30  Slide up into IR x 20 Standing x 30  Slide up into IR x 20 Standing x 30x Standing x 30x   Ball circles     Standing IR with SOS 20x 5 sec hold Standing IR with SOS 20x 5 sec hold   AAROM wall walks x10 ea flexion and  scaption (both within pain-free range) X 10 ea flexion and scaption  X 10 ea flexion and scaption X 15 ea flexion and scaption     Counter reach  2x10 2#      D2 flex/ext with YTB x15 each    TB shouler IR/ER  2x10 GTB 2*10 GTB No monies green loop 20 x No monies green loop 20 x No monies blue loop 20x No monies blue loop 20x   pec stretch         Towel into wall  IR/ ER 2*10, 5 sec hold Ball rolls CW, CCW 20x Ball rolls CW, CCW 20x  TB flies, push press 2x10 YTB   Thera Activity         Patient education         Posture education                                                      Modalities         Heat pre           Insurance:  AMA/CMS Eval/ Re-eval POC expires Rhianna Silva #/ Referral # Total   Visits  Start date  Expiration date Extension  Visit limitation? PT only or  PT+OT?  Co-Insurance   [employer]  7/6 9/6 32/61 AUTH REQUIRED 99 7/6   BOMN PT $10 Co-pay                                                                 AUTH #:  Date 7/6 7/8 7/11 7/13 7/18 7/21 7/26 7/28 8/1 8/4 8/9 8/11   Visits  Authed: 99 Used 1 1 1 1 1 1 1 1 1 1 1 1    Remaining  98 97 96 95 94 93 92 91 90 89 88 85            AUTH #:  Date 8/16 8/18 8/25 8/30 9/8          Visits  Authed: 99 Used 1 1 1 1 1           Remaining  84 83 82 81 80

## 2022-09-15 ENCOUNTER — OFFICE VISIT (OUTPATIENT)
Dept: PHYSICAL THERAPY | Facility: CLINIC | Age: 72
End: 2022-09-15
Payer: COMMERCIAL

## 2022-09-15 DIAGNOSIS — S42.295D OTHER CLOSED NONDISPLACED FRACTURE OF PROXIMAL END OF LEFT HUMERUS WITH ROUTINE HEALING, SUBSEQUENT ENCOUNTER: ICD-10-CM

## 2022-09-15 DIAGNOSIS — S42.295D TRAUMATIC CLOSED NONDISPLACED FRACTURE OF ANATOMICAL NECK OF HUMERUS, LEFT, WITH ROUTINE HEALING, SUBSEQUENT ENCOUNTER: Primary | ICD-10-CM

## 2022-09-15 PROCEDURE — 97110 THERAPEUTIC EXERCISES: CPT

## 2022-09-15 PROCEDURE — 97112 NEUROMUSCULAR REEDUCATION: CPT

## 2022-09-15 NOTE — PROGRESS NOTES
Daily Note     Today's date: 9/15/2022  Patient name: Brooklyn Nolan  : 1950  MRN: [id number]  Referring provider: Felipe Holland  Dx:   Encounter Diagnosis     ICD-10-CM    1  Traumatic closed nondisplaced fracture of anatomical neck of humerus, left, with routine healing, subsequent encounter  S42 295D    2  Other closed nondisplaced fracture of proximal end of left humerus with routine healing, subsequent encounter  S42 295D                   Subjective: Patient reports 0/10 pain in left shoulder but is still having difficulty with IR      Objective: See treatment diary below      Assessment: Tolerated treatment well  Patient would continue to benefit from skilled physical therapy in order to continue building shoulder girdle strength  Patient limited in IR  Patient did well with the addition of KB throws and was appropriately challenged  Plan: Continue per plan of care        Date 8/30 9/1 9/8 9/13 9/15   Visit Number 16 17 18 19 20           Manual        GH mobs         AC mobs         Scap mobs         TPR PROM 8' 5' PROM 5' PROM     STM        Thoracic mobs        Neuro Re-ed        Shoulder isos        Scap retract  RTB shoulder ext 20x + walkout 20x RTB shoulder ext  BTB shoulder ext 30x BTB shoulder ext 30x BTB shoulder ext 30x   TB Rows GTB 20x GTB 20x BTB 30x   Triceps 30x BTB BTB 30x   Triceps 30x BTB BTB 30x   Triceps 30x BTB   Serratus punch   Standing I, T, Y 1*10 with 1#, 1 x 10 with no weight but therapist resisting shoulder hike Standing I, Y, T 4# weight     Bent over rows 2x10 4#  Standing I, Y, T 4#    Bent over rows 2*10 9# b/l   Serratus push up 2*10 off table    Alternating shoulder taps 2x10  Alternating shoulder taps  2*10   Shrugs X 20  20x      Tricep ext         Thoracic extensions     9# KB throws into flexion both hands 2*10   Thoracic rotations     Over head press 4 # 10   Thera Ex        Pulleys  Seated flexion x 5'  Seated scaption x 5' Seated flexion x 5'  Seated scaption x 5' Seated flexion x 5' seated scaption  X 5' Seated flexion x 5' Seated flexion x 5'   Shoulder PROM     sidelying ER 4# 20x   Cane flex Supine 2*10 Supine 20x      Cane abd 45 deg        Cane ER  Supine 2*10 Supine 20x      Cane extension Standing x 30  Slide up into IR x 20 Standing x 30  Slide up into IR x 20 Standing x 30x Standing x 30x    Ball circles   Standing IR with SOS 20x 5 sec hold Standing IR with SOS 20x 5 sec hold Standing IR with SOS 20x 5 sec hold   AAROM wall walks X 10 ea flexion and scaption X 15 ea flexion and scaption      Counter reach     D2 flex/ext with YTB x15 each  D2 flex/ext with GTB x 15 ea   TB shouler IR/ER  No monies green loop 20 x No monies green loop 20 x No monies blue loop 20x No monies blue loop 20x No monies blue loop 20x   pec stretch        Towel into wall Ball rolls CW, CCW 20x Ball rolls CW, CCW 20x  TB flies, push press 2x10 YTB TB flies, push press 2*10 GTB   Thera Activity        Patient education        Posture education                                                Modalities        Heat pre          Insurance:  AMA/CMS Eval/ Re-eval POC expires Stan Mar #/ Referral # Total   Visits  Start date  Expiration date Extension  Visit limitation? PT only or  PT+OT?  Co-Insurance   [employer]  7/6 9/6 32/61 AUTH REQUIRED 99 7/6   BOMN PT $10 Co-pay                                                                 AUTH #:  Date 7/6 7/8 7/11 7/13 7/18 7/21 7/26 7/28 8/1 8/4 8/9 8/11   Visits  Authed: 99 Used 1 1 1 1 1 1 1 1 1 1 1 1    Remaining  98 97 96 95 94 93 92 91 90 89 88 85            AUTH #:  Date 8/16 8/18 8/25 8/30 9/8 9/15         Visits  Authed: 99 Used 1 1 1 1 1 1          Remaining  84 83 82 81 80 79

## 2022-09-20 ENCOUNTER — OFFICE VISIT (OUTPATIENT)
Dept: PHYSICAL THERAPY | Facility: CLINIC | Age: 72
End: 2022-09-20
Payer: COMMERCIAL

## 2022-09-20 DIAGNOSIS — S42.295D OTHER CLOSED NONDISPLACED FRACTURE OF PROXIMAL END OF LEFT HUMERUS WITH ROUTINE HEALING, SUBSEQUENT ENCOUNTER: ICD-10-CM

## 2022-09-20 DIAGNOSIS — S42.295D TRAUMATIC CLOSED NONDISPLACED FRACTURE OF ANATOMICAL NECK OF HUMERUS, LEFT, WITH ROUTINE HEALING, SUBSEQUENT ENCOUNTER: Primary | ICD-10-CM

## 2022-09-20 PROCEDURE — 97110 THERAPEUTIC EXERCISES: CPT

## 2022-09-20 PROCEDURE — 97112 NEUROMUSCULAR REEDUCATION: CPT

## 2022-09-20 NOTE — PROGRESS NOTES
Daily Note     Today's date: 2022  Patient name: Elissa Díaz  : 1950  MRN: [id number]  Referring provider: Tim Sapp  Dx:   Encounter Diagnosis     ICD-10-CM    1  Traumatic closed nondisplaced fracture of anatomical neck of humerus, left, with routine healing, subsequent encounter  S42 295D    2  Other closed nondisplaced fracture of proximal end of left humerus with routine healing, subsequent encounter  S42 295D        Start Time: 930  Stop Time: 1015  Total time in clinic (min): 45 minutes    Subjective: Patient reports increased L shoulder pain when she is performing activities behind her back  Objective: See treatment diary below      Assessment: Tolerated treatment well  Challenged patient with strengthening activities both into extension and overhead mobility  Patient notes increased soreness post tx today  Plan: Continue per plan of care        Date 9/20 9/1 9/8 9/13 9/15   Visit Number 21 17 18 19 20           Manual        GH mobs         AC mobs         Scap mobs         TPR PROM 8' 5' PROM 5' PROM     STM        Thoracic mobs        Neuro Re-ed        Shoulder isos        Scap retract  BTB x30  RTB shoulder ext  BTB shoulder ext 30x BTB shoulder ext 30x BTB shoulder ext 30x   TB Rows BTB 30x   Triceps 30x BTB GTB 20x BTB 30x   Triceps 30x BTB BTB 30x   Triceps 30x BTB BTB 30x   Triceps 30x BTB   Serratus punch   Standing I, T, Y 1*10 with 1#, 1 x 10 with no weight but therapist resisting shoulder hike Standing I, Y, T 4# weight     Bent over rows 2x10 4#  Standing I, Y, T 4#    Bent over rows 2*10 9# b/l   Serratus push up    Alternating shoulder taps 2x10  Alternating shoulder taps  2*10   Shrugs  20x      Tricep ext         Thoracic extensions     9# KB throws into flexion both hands 2*10   Thoracic rotations     Over head press 4 # 10   Thera Ex        Pulleys  Seated flexion x 5'  Seated scaption x 5' Seated flexion x 5'  Seated scaption x 5' Seated flexion x 5' seated scaption  X 5' Seated flexion x 5' Seated flexion x 5'   Shoulder PROM     sidelying ER 4# 20x   Cane flex  Supine 20x      Cane abd 45 deg        Cane ER   Supine 20x      Cane extension  Standing x 30  Slide up into IR x 20 Standing x 30x Standing x 30x    Ball circles Standing IR with SOS 2x10, 3s hold  Standing IR with SOS 20x 5 sec hold Standing IR with SOS 20x 5 sec hold Standing IR with SOS 20x 5 sec hold   AAROM wall walks Wall slides with loop 2x10 light loop X 15 ea flexion and scaption      Counter reach     D2 flex/ext with YTB x15 each  D2 flex/ext with GTB x 15 ea   TB shouler IR/ER  GTB 2x10 each  No monies green loop 20 x No monies blue loop 20x No monies blue loop 20x No monies blue loop 20x   pec stretch Shoulder flex, abd, scap 2x10 3#        Towel into wall Lat pulldown 7 5# CC 2x10  Ball rolls CW, CCW 20x  TB flies, push press 2x10 YTB TB flies, push press 2*10 GTB   Thera Activity        Patient education        Posture education                                                Modalities        Heat pre          Insurance:  AMA/CMS Eval/ Re-eval POC expires Nataliety Nahid #/ Referral # Total   Visits  Start date  Expiration date Extension  Visit limitation? PT only or  PT+OT?  Co-Insurance   [employer]  7/6 9/6 32/61 AUTH REQUIRED 99 7/6   BOMN PT $10 Co-pay                                                                 AUTH #:  Date 7/6 7/8 7/11 7/13 7/18 7/21 7/26 7/28 8/1 8/4 8/9 8/11   Visits  Authed: 99 Used 1 1 1 1 1 1 1 1 1 1 1 1    Remaining  98 97 96 95 94 93 92 91 90 89 88 85            AUTH #:  Date 8/16 8/18 8/25 8/30 9/8 9/15 9/20        Visits  Authed: 99 Used 1 1 1 1 1 1 1         Remaining  84 83 82 81 80 79 78

## 2022-09-22 ENCOUNTER — OFFICE VISIT (OUTPATIENT)
Dept: PHYSICAL THERAPY | Facility: CLINIC | Age: 72
End: 2022-09-22
Payer: COMMERCIAL

## 2022-09-22 DIAGNOSIS — S42.295D TRAUMATIC CLOSED NONDISPLACED FRACTURE OF ANATOMICAL NECK OF HUMERUS, LEFT, WITH ROUTINE HEALING, SUBSEQUENT ENCOUNTER: Primary | ICD-10-CM

## 2022-09-22 DIAGNOSIS — S42.295D OTHER CLOSED NONDISPLACED FRACTURE OF PROXIMAL END OF LEFT HUMERUS WITH ROUTINE HEALING, SUBSEQUENT ENCOUNTER: ICD-10-CM

## 2022-09-22 PROCEDURE — 97112 NEUROMUSCULAR REEDUCATION: CPT

## 2022-09-22 PROCEDURE — 97110 THERAPEUTIC EXERCISES: CPT

## 2022-09-22 NOTE — PROGRESS NOTES
Daily Note     Today's date: 2022  Patient name: Brooklyn Nolan  : 1950  MRN: [id number]  Referring provider: Felipe Holland  Dx:   Encounter Diagnosis     ICD-10-CM    1  Traumatic closed nondisplaced fracture of anatomical neck of humerus, left, with routine healing, subsequent encounter  S42 295D    2  Other closed nondisplaced fracture of proximal end of left humerus with routine healing, subsequent encounter  S42 295D                   Subjective: Patient slight achiness in left shoulder  Patient reports that she has been lifting 5# weights daily  Objective: See treatment diary below      Assessment: Tolerated treatment well  Patient would benefit from continued PT in order to build shoulder strength and ROM  Body blade was introduced and patient did care for it  Patient advanced to 5# hand weights on I, T, Ys       Plan: Continue per plan of care        Date 9/20 9/22  9/13 9/15   Visit Number 21 22  19 20           Manual        GH mobs         AC mobs         Scap mobs         TPR PROM 8' 5' PROM 5' PROM     STM        Thoracic mobs        Neuro Re-ed        Shoulder isos        Scap retract  BTB x30  BTB shoulder ext, 30x  BTB shoulder ext 30x BTB shoulder ext 30x BTB shoulder ext 30x   TB Rows BTB 30x   Triceps 30x BTB BTB 30x  IR/ER30x BTB 30x   Triceps 30x BTB BTB 30x   Triceps 30x BTB BTB 30x   Triceps 30x BTB   Serratus punch  Body blade 4*20 sec Standing I, T, Y 1*10 with 1#, 1 x 10 with no weight but therapist resisting shoulder hike Standing I, Y, T 4# weight     Bent over rows 2x10 4#  Standing I, Y, T 4#    Bent over rows 2*10 9# b/l   Serratus push up  Biceps curl, triceps kick back,  20x  Alternating shoulder taps 2x10  Alternating shoulder taps  2*10   Shrugs  20x      Tricep ext         Thoracic extensions  Standing I, T, Y 5# 20x ea   9# KB throws into flexion both hands 2*10   Thoracic rotations  Single arm KB row 20x LUE, RUE   Over head press 4 # 10   Thera Ex Pulleys  Seated flexion x 5'  Seated scaption x 5' Seated flexion x 5'  Seated scaption x 5' Seated flexion x 5' seated scaption  X 5' Seated flexion x 5' Seated flexion x 5'   Shoulder PROM     sidelying ER 4# 20x   Cane flex        Cane abd 45 deg        Cane ER   Supine 20x      Cane extension  Standing x 30  Slide up into IR x 20 w SOS Standing x 30x Standing x 30x    Ball circles Standing IR with SOS 2x10, 3s hold  Standing IR with SOS 20x 5 sec hold Standing IR with SOS 20x 5 sec hold Standing IR with SOS 20x 5 sec hold   AAROM wall walks Wall slides with loop 2x10 light loop Towel slides 30x       Counter reach     D2 flex/ext with YTB x15 each  D2 flex/ext with GTB x 15 ea   TB shouler IR/ER  GTB 2x10 each  No monies green loop 20 x No monies blue loop 20x No monies blue loop 20x No monies blue loop 20x   pec stretch Shoulder flex, abd, scap 2x10 3#        Towel into wall Lat pulldown 7 5# CC 2x10  Ball rolls CW, CCW 20x  TB flies, push press 2x10 YTB TB flies, push press 2*10 GTB   Thera Activity        Patient education        Posture education                                                Modalities        Heat pre          Insurance:  AMA/CMS Eval/ Re-eval POC expires Mike Downing #/ Referral # Total   Visits  Start date  Expiration date Extension  Visit limitation? PT only or  PT+OT?  Co-Insurance   [employer]  7/6 9/6 32/61 AUTH REQUIRED 99 7/6   BOMN PT $10 Co-pay                                                                 AUTH #:  Date 7/6 7/8 7/11 7/13 7/18 7/21 7/26 7/28 8/1 8/4 8/9 8/11   Visits  Authed: 99 Used 1 1 1 1 1 1 1 1 1 1 1 1    Remaining  98 97 96 95 94 93 92 91 90 89 88 85            AUTH #:  Date 8/16 8/18 8/25 8/30 9/8 9/15 9/20 9/22       Visits  Authed: 99 Used 1 1 1 1 1 1 1 1        Remaining  84 83 82 81 80 79 78 77

## 2022-09-27 ENCOUNTER — OFFICE VISIT (OUTPATIENT)
Dept: PHYSICAL THERAPY | Facility: CLINIC | Age: 72
End: 2022-09-27
Payer: COMMERCIAL

## 2022-09-27 DIAGNOSIS — S42.295D TRAUMATIC CLOSED NONDISPLACED FRACTURE OF ANATOMICAL NECK OF HUMERUS, LEFT, WITH ROUTINE HEALING, SUBSEQUENT ENCOUNTER: Primary | ICD-10-CM

## 2022-09-27 DIAGNOSIS — S42.295D OTHER CLOSED NONDISPLACED FRACTURE OF PROXIMAL END OF LEFT HUMERUS WITH ROUTINE HEALING, SUBSEQUENT ENCOUNTER: ICD-10-CM

## 2022-09-27 PROCEDURE — 97112 NEUROMUSCULAR REEDUCATION: CPT

## 2022-09-27 PROCEDURE — 97110 THERAPEUTIC EXERCISES: CPT

## 2022-09-27 NOTE — PROGRESS NOTES
Daily Note     Today's date: 2022  Patient name: Lori Espinal  : 1950  MRN: [id number]  Referring provider: Yung Lin PA-C  Dx: No diagnosis found  Start Time: 930  Stop Time:   Total time in clinic (min): 45 minutes    Subjective: Patient reports that the cold is bothering her shoulder a little today  Objective: See treatment diary below      Assessment: Tolerated treatment well  Patient would benefit from continued PT in order to continue building left shoulder girdle strength and ROM  Patient has made improvements on ability to utilize body blade  Patient tolerated the addition of yellow theraball on table for stretching well  Plan: Continue per plan of care        Date 9/20 9/22 9/27  9/15   Visit Number 21 22 23  20           Manual        GH mobs         AC mobs         Scap mobs         TPR PROM 8' 5' PROM 5' PROM     STM        Thoracic mobs        Neuro Re-ed        Shoulder isos        Scap retract  BTB x30  BTB shoulder ext, 30x  BTB shoulder ext 30x BTB shoulder ext 30x BTB shoulder ext 30x   TB Rows BTB 30x   Triceps 30x BTB BTB 30x  IR/ER30x BTB 30x   Triceps 30x BTB BTB 30x   Triceps 30x BTB BTB 30x   Triceps 30x BTB   Serratus punch  Body blade 4*20 sec Wall slides green loop 25x, Standing I, Y, T 4# weight     Bent over rows 2x10 4#  Standing I, Y, T 4#    Bent over rows 2*10 9# b/l   Serratus push up  Biceps curl, triceps kick back,  20x No monies 25x green loop Alternating shoulder taps 2x10  Alternating shoulder taps  2*10   Shrugs  20x      Tricep ext         Thoracic extensions  Standing I, T, Y 5# 20x ea 9# KB throws into flexion both hands 2*10  9# KB throws into flexion both hands 2*10   Thoracic rotations  Single arm KB row 20x LUE RUSERA   Over head press 4 # 10   Thera Ex        Pulleys  Seated flexion x 5'  Seated scaption x 5' Seated flexion x 5'  Seated scaption x 5' Seated flexion x 5' seated scaption  X 5' Seated flexion x 5' Seated flexion x 5'   Shoulder PROM   Body blade all directions 3'  sidelying ER 4# 20x   Cane flex        Cane abd 45 deg        Cane ER   Supine 20x      Cane extension  Standing x 30  Slide up into IR x 20 w SOS Standing x 30x Standing x 30x    Ball circles Standing IR with SOS 2x10, 3s hold  Standing IR with SOS 20x 5 sec hold Standing IR with SOS 20x 5 sec hold Standing IR with SOS 20x 5 sec hold   AAROM wall walks Wall slides with loop 2x10 light loop Towel slides 30x  Cane extension, ER, flexion 20x     Counter reach     D2 flex/ext with YTB x15 each  D2 flex/ext with GTB x 15 ea   TB shouler IR/ER  GTB 2x10 each  No monies green loop 20 x No monies blue loop 20x No monies blue loop 20x No monies blue loop 20x   pec stretch Shoulder flex, abd, scap 2x10 3#        Towel into wall Lat pulldown 7 5# CC 2x10  Ball rolls CW, CCW 20x  TB flies, push press 2x10 YTB TB flies, push press 2*10 GTB   Thera Activity        Patient education        Posture education                                                Modalities        Heat pre          Insurance:  AMA/CMS Eval/ Re-eval POC expires Pérez Harding #/ Referral # Total   Visits  Start date  Expiration date Extension  Visit limitation? PT only or  PT+OT?  Co-Insurance   [employer]  7/6 9/6 32/61 AUTH REQUIRED 99 7/6   BOMN PT $10 Co-pay                                                                 AUTH #:  Date 7/6 7/8 7/11 7/13 7/18 7/21 7/26 7/28 8/1 8/4 8/9 8/11   Visits  Authed: 99 Used 1 1 1 1 1 1 1 1 1 1 1 1    Remaining  98 97 96 95 94 93 92 91 90 89 88 85            AUTH #:  Date 8/16 8/18 8/25 8/30 9/8 9/15 9/20 9/22       Visits  Authed: 99 Used 1 1 1 1 1 1 1 1        Remaining  84 83 82 81 80 79 78 77

## 2022-09-29 ENCOUNTER — OFFICE VISIT (OUTPATIENT)
Dept: PHYSICAL THERAPY | Facility: CLINIC | Age: 72
End: 2022-09-29
Payer: COMMERCIAL

## 2022-09-29 DIAGNOSIS — S42.295D TRAUMATIC CLOSED NONDISPLACED FRACTURE OF ANATOMICAL NECK OF HUMERUS, LEFT, WITH ROUTINE HEALING, SUBSEQUENT ENCOUNTER: Primary | ICD-10-CM

## 2022-09-29 DIAGNOSIS — S42.295D OTHER CLOSED NONDISPLACED FRACTURE OF PROXIMAL END OF LEFT HUMERUS WITH ROUTINE HEALING, SUBSEQUENT ENCOUNTER: ICD-10-CM

## 2022-09-29 PROCEDURE — 97110 THERAPEUTIC EXERCISES: CPT | Performed by: PHYSICAL THERAPIST

## 2022-09-29 PROCEDURE — 97112 NEUROMUSCULAR REEDUCATION: CPT | Performed by: PHYSICAL THERAPIST

## 2022-09-29 NOTE — PROGRESS NOTES
Daily Note     Today's date: 2022  Patient name: Aide Brown  : 1950  MRN: [id number]  Referring provider: Caty Jiménez  Dx:   Encounter Diagnosis     ICD-10-CM    1  Traumatic closed nondisplaced fracture of anatomical neck of humerus, left, with routine healing, subsequent encounter  S42 295D    2  Other closed nondisplaced fracture of proximal end of left humerus with routine healing, subsequent encounter  S42 295D                   Subjective: Pt reports "it's there" 1/10 pain in shoulder, she admits it's tolerable  Pt rpeorts IR stretch with SODS behind her back is her most limiting activity during therapy  Objective: See treatment diary below      Assessment: Tolerated treatment well  Patient demo ongoing lmiitations of PROM however precedes pain  Pt is motivated however acknowledges remaining dreficits and is not discouraged by having to make modificaitons to accomodate remianing limitations at this itme  Pt is aware of ongoing need for HEP following inevitable PT DC to maximize regaining motion over course of remaining healing process  Plan: Continue per plan of care        Date     Visit Number 21 22 23 24            Manual        GH mobs         AC mobs         Scap mobs         TPR PROM 8' 5' PROM 5' PROM 8'    STM        Thoracic mobs        Neuro Re-ed        Shoulder isos        Scap retract  BTB x30  BTB shoulder ext, 30x  BTB shoulder ext 30x BTB shoulder ext 30x    TB Rows BTB 30x   Triceps 30x BTB BTB 30x  IR/ER30x BTB 30x   Triceps 30x BTB BTB 30x   Triceps 30x BTB    Serratus punch  Body blade 4*20 sec Wall slides green loop 25x, Standing I, Y, T 4# weight     Bent over rows 2x10 4#     Serratus push up  Biceps curl, triceps kick back,  20x No monies 25x green loop Alternating shoulder taps 2x10     Shrugs  20x      Tricep ext         Thoracic extensions  Standing I, T, Y 5# 20x ea 9# KB throws into flexion both hands 2*10 9# KB throws into flexion both hands 2*10    Thoracic rotations  Single arm KB row 20x LUE, RUE      Thera Ex        Pulleys  Seated flexion x 5'  Seated scaption x 5' Seated flexion x 5'  Seated scaption x 5' Seated flexion x 5' seated scaption  X 5' Seated flexion x 5'    Shoulder PROM   Body blade all directions 3' 3' ea    Cane flex        Cane abd 45 deg        Cane ER   Supine 20x  manual    Cane extension  Standing x 30  Slide up into IR x 20 w SOS Standing x 30x Standing x 30x    Ball circles Standing IR with SOS 2x10, 3s hold  Standing IR with SOS 20x 5 sec hold     AAROM wall walks Wall slides with loop 2x10 light loop Towel slides 30x  Cane extension, ER, flexion 20x Cane extension, ER, flexion 20x    Counter reach     D2 flex/ext with YTB x15 each     TB shouler IR/ER  GTB 2x10 each  No monies green loop 20 x No monies blue loop 20x No monies blue loop 20x    pec stretch Shoulder flex, abd, scap 2x10 3#        Towel into wall Lat pulldown 7 5# CC 2x10  Ball rolls CW, CCW 20x      Thera Activity        Patient education        Posture education                                                Modalities        Heat pre          Insurance:  AMA/CMS Eval/ Re-eval POC expires Stephanie Boast #/ Referral # Total   Visits  Start date  Expiration date Extension  Visit limitation? PT only or  PT+OT?  Co-Insurance   [employer]  7/6 9/6 32/61 AUTH REQUIRED 99 7/6   BOMN PT $10 Co-pay                                                                 AUTH #:  Date 7/6 7/8 7/11 7/13 7/18 7/21 7/26 7/28 8/1 8/4 8/9 8/11   Visits  Authed: 99 Used 1 1 1 1 1 1 1 1 1 1 1 1    Remaining  98 97 96 95 94 93 92 91 90 89 88 85            AUTH #:  Date 8/16 8/18 8/25 8/30 9/8 9/15 9/20 9/22 9/27 9/29     Visits  Authed: 99 Used 1 1 1 1 1 1 1 1  1      Remaining  84 83 82 81 80 79 78 77

## 2022-10-04 ENCOUNTER — OFFICE VISIT (OUTPATIENT)
Dept: PHYSICAL THERAPY | Facility: CLINIC | Age: 72
End: 2022-10-04
Payer: COMMERCIAL

## 2022-10-04 DIAGNOSIS — S42.295D TRAUMATIC CLOSED NONDISPLACED FRACTURE OF ANATOMICAL NECK OF HUMERUS, LEFT, WITH ROUTINE HEALING, SUBSEQUENT ENCOUNTER: Primary | ICD-10-CM

## 2022-10-04 DIAGNOSIS — S42.295D OTHER CLOSED NONDISPLACED FRACTURE OF PROXIMAL END OF LEFT HUMERUS WITH ROUTINE HEALING, SUBSEQUENT ENCOUNTER: ICD-10-CM

## 2022-10-04 PROCEDURE — 97110 THERAPEUTIC EXERCISES: CPT

## 2022-10-04 NOTE — PROGRESS NOTES
Daily Note     Today's date: 10/4/2022  Patient name: Sonia Mccartney  : 1950  MRN: [id number]  Referring provider: Joshua Lewis  Dx:   Encounter Diagnosis     ICD-10-CM    1  Traumatic closed nondisplaced fracture of anatomical neck of humerus, left, with routine healing, subsequent encounter  S42 295D    2  Other closed nondisplaced fracture of proximal end of left humerus with routine healing, subsequent encounter  S42 295D        Start Time: 930  Stop Time: 101  Total time in clinic (min): 45 minutes    Subjective: Pt reports difficulties with reaching behind her back due to increased pain and loss of motion  She is able to perform all functional activities with minimal difficulties  Objective: See treatment diary below      Assessment: Tolerated treatment well  Patient Patient continues to demonstrate loss of motion with L shoulder flexion, abduction, and functional IR BTB due to pain and empty end feel  Discussion with patient regarding progress and plan moving forward  Patient agreed to discharging next week, as she has met all of her goals at this time  The remainder 3 visits will be use to create a HEP to maximize strength and ROM at home and when she is in [place]  Plan: Continue per plan of care        Date 9/20 9/22 9/27 9/29 10/4   Visit Number 21 22 23 24 25           Manual        GH mobs         AC mobs         Scap mobs         TPR PROM 8' 5' PROM 5' PROM 8' 8'   STM        Thoracic mobs        Neuro Re-ed        Shoulder isos        Scap retract  BTB x30  BTB shoulder ext, 30x  BTB shoulder ext 30x BTB shoulder ext 30x BTB shoulder ext 30x   TB Rows BTB 30x   Triceps 30x BTB BTB 30x  IR/ER30x BTB 30x   Triceps 30x BTB BTB 30x   Triceps 30x BTB BTB 30x   Triceps 30x 12 5# CC   Serratus punch  Body blade 4*20 sec Wall slides green loop 25x, Standing I, Y, T 4# weight     Bent over rows 2x10 4#     Serratus push up  Biceps curl, triceps kick back,  20x No monies 25x green loop Alternating shoulder taps 2x10  Alternating shoulder taps 2x10    Shrugs  20x      Tricep ext         Thoracic extensions  Standing I, T, Y 5# 20x ea 9# KB throws into flexion both hands 2*10 9# KB throws into flexion both hands 2*10    Thoracic rotations  Single arm KB row 20x LUE, RUE      Thera Ex        Pulleys  Seated flexion x 5'  Seated scaption x 5' Seated flexion x 5'  Seated scaption x 5' Seated flexion x 5' seated scaption  X 5' Seated flexion x 5' Seated flexion x 5'   Shoulder PROM   Body blade all directions 3' 3' ea    Cane flex        Cane abd 45 deg        Cane ER   Supine 20x  manual    Cane extension  Standing x 30  Slide up into IR x 20 w SOS Standing x 30x Standing x 30x Standing x30   Ball circles Standing IR with SOS 2x10, 3s hold  Standing IR with SOS 20x 5 sec hold  Standing IR with SOS 20x 5 sec hold   AAROM wall walks Wall slides with loop 2x10 light loop Towel slides 30x  Cane extension, ER, flexion 20x Cane extension, ER, flexion 20x    Counter reach     D2 flex/ext with YTB x15 each  D2 flex/ext with YTB x15 each    TB shouler IR/ER  GTB 2x10 each  No monies green loop 20 x No monies blue loop 20x No monies blue loop 20x No monies blue loop 20x   pec stretch Shoulder flex, abd, scap 2x10 3#        Towel into wall Lat pulldown 7 5# CC 2x10  Ball rolls CW, CCW 20x      Thera Activity        Patient education        Posture education                                                Modalities        Heat pre          Insurance:  AMA/CMS Eval/ Re-eval POC expires Carroll Leiva #/ Referral # Total   Visits  Start date  Expiration date Extension  Visit limitation? PT only or  PT+OT?  Co-Insurance   [employer]  7/6 9/6 32/61 AUTH REQUIRED 99 7/6   BOMN PT $10 Co-pay                                                                 AUTH #:  Date 7/6 7/8 7/11 7/13 7/18 7/21 7/26 7/28 8/1 8/4 8/9 8/11   Visits  Authed: 99 Used 1 1 1 1 1 1 1 1 1 1 1 1    Remaining  98 97 96 95 94 93 92 91 90 89 88 Osiris Carr #:  Date 8/16 8/18 8/25 8/30 9/8 9/15 9/20 9/22 9/27 9/29 10/4    Visits  Authed: 99 Used 1 1 1 1 1 1 1 1 1 1 1     Remaining  84 83 82 81 80 79 78 77 76 75 74

## 2022-10-06 ENCOUNTER — EVALUATION (OUTPATIENT)
Dept: PHYSICAL THERAPY | Facility: CLINIC | Age: 72
End: 2022-10-06
Payer: COMMERCIAL

## 2022-10-06 DIAGNOSIS — S42.295D OTHER CLOSED NONDISPLACED FRACTURE OF PROXIMAL END OF LEFT HUMERUS WITH ROUTINE HEALING, SUBSEQUENT ENCOUNTER: ICD-10-CM

## 2022-10-06 DIAGNOSIS — S42.295D TRAUMATIC CLOSED NONDISPLACED FRACTURE OF ANATOMICAL NECK OF HUMERUS, LEFT, WITH ROUTINE HEALING, SUBSEQUENT ENCOUNTER: Primary | ICD-10-CM

## 2022-10-06 PROCEDURE — 97110 THERAPEUTIC EXERCISES: CPT

## 2022-10-06 NOTE — PROGRESS NOTES
PT Re-Evaluation     Today's date: 10/6/2022  Patient name: Jessica Juarez  : 1950  MRN: [id number]  Referring provider: Langley Boeck  Dx:   Encounter Diagnosis     ICD-10-CM    1  Traumatic closed nondisplaced fracture of anatomical neck of humerus, left, with routine healing, subsequent encounter  S42 295D    2  Other closed nondisplaced fracture of proximal end of left humerus with routine healing, subsequent encounter  S42 295D          Assessment  Assessment details: Patient is a 74 y o  Female who presents to skilled outpatient PT with referring diagnosis of other closed nondisplaced fracture of proximal end of left humerus with routine healing, subsequent encounter  Patient demonstrates improvements in ROM, strength, and overall functional mobility since starting PT intervention  At this time, patient continues to struggle with lifting a pot of water onto the stove due to pain and weakness  She is independent with all activities within her home  Patient would benefit from skilled PT to provided comprehensive HEP to maximize function for ease with community activities  Impairments: Abnormal muscle tone, Abnormal or restricted ROM, Impaired physical strength, Poor posture, Pain with function and Scapular dyskinesis  Understanding of Dx/Px/POC: Excellent  Prognosis: Excellent      Plan  Patient would benefit from: PT Eval and Skilled PT  Planned modality interventions: Biofeedback, Cryotherapy, TENS, Thermotherapy: Hydrocollator Packs and Traction  Planned therapy interventions:  Body mechanics training, Functional ROM exercises, HEP, Joint mobilization, Manual therapy, Neuromuscular re-education, Patient education, Postural training, Strengthening, Stretching, Therapeutic activities, Therapeutic exercises, Therapeutic training, Transfer training and Activity modification  Frequency: 2x/wk  Duration in weeks: 8  Plan of Care beginning date: 22  Plan of Care expiration date: 8 weeks - 22  Treatment plan discussed with: Patient       Goals  Short Term Goals (4 weeks):  MET as of   - Patient will be independent in basic HEP 2-3 weeks  - Patient will have 0/10 pain at rest  - Patient will demonstrate >1/3 improvement in MMT grade as applicable  - Patient functional goal: Patient will floss teeth independently  Long Term Goals (8 weeks): MET as of   - Patient will be independent in a comprehensive home exercise program  - Patient FOTO score will improve to 61/100  - Patient will self-report >75% improvement in function  - Patient functional goal: Patient will resume all self care activities independently and pain free  Updated goals per : MET as of 10/6  - Patient will be able to mop floors with <3/10 pain   - Patient will be able to lift 5lbs (the weight of 2 dinner plates) overhead with <2/10 pain   - Patient will be able to open a door with her L arm with <2/10 pain     Subjective    History of Present Illness  - Mechanism of injury: Patient reports 1/10 pain in her shoulder today  She reports 90% improvement in function since starting PT intervention  She continues to note increased pain and weakness when lifting a pot of water onto the stove   She is able to complete most activities at home with no increased pain or difficulty     - Functional limitations: reaching behind back    - Patient goals: mopping, reaching overhead while holding dishes, and open a door with her L arm    Pain  - Current pain ratin/10  - At best pain ratin/10  - At worst pain ratin/10  - Location: L shoulder   - Alleviating factors: ice, Tylenol     Social Support  - Steps to enter house: 12  - Stairs in house: 3   - Bedroom/bathroom set up: second floor  - Lives in: multi-level house   - Lives with:        Objective   UE MMT  - L Shoulder Flexion: 4/5   R Shoulder Flexion: 5/5  - L Shoulder Extension: 4/5   R Shoulder Extension: 5/5  - L Shoulder Abduction: 4/5   R Shoulder Abduction: 5/5  - L Shoulder  Adduction: 4/5   R Shoulder  Adduction: 5/5  - L Shoulder  IR: 4/5    R Shoulder  IR: 5/5  - L Shoulder  ER: 4/5    R Shoulder  ER: 5/5  - L Elbow Extension: 4/5   R Elbow Extension: 5/5   - L Elbow Flexion: 5/5    R Elbow Flexion: 5/5  - L Wrist Flexion: 5/5    R Wrist Flexion: 5/5  - L Wrist Extension: 5/5   R Wrist Extension: 5/5    Sensation  - Light touch: intact     Palpation   -TTP L biceps tendon origin, L proximal humerus, L UT     Shoulder AROM L R   Flexion 165 deg 180 deg   Extension 40 deg 40 deg   ER 70 deg 70 deg   IR 70 deg 70 deg   Abduction 160 deg 180 deg   Functional IR BTB C7 C7   Functional ER BTH L1 T12     Shoulder PROM L R   Flexion 165 deg 180 deg   Extension 40 deg 40 deg   ER 90 deg 90 deg   IR 75 deg 70 deg   Abduction 160 deg 180 deg     R handgrip: 32 lbs   L handgrip: 26 lbs      Precautions: L humeral fracture 6/3/2022 (wear sling and restore ROM as per MD recommendation)     Past Medical History:   Diagnosis Date    Anxiety     Arthritis     Disease of thyroid gland     Glaucoma     bilateral    Osteoporosis     SVT (supraventricular tachycardia) (HCC)     Vitamin D deficiency        Date 9/20 9/22 9/27 9/29 10/4 10/6   Visit Number 21 [id number] 23 24 25 26            Manual         GH mobs          AC mobs          Scap mobs          TPR PROM 8' 5' PROM 5' PROM 8' 8' 8'   STM         Thoracic mobs         Neuro Re-ed         Shoulder isos         Scap retract  BTB x30  BTB shoulder ext, 30x  BTB shoulder ext 30x BTB shoulder ext 30x BTB shoulder ext 30x BTB shoulder ext 30x   TB Rows BTB 30x   Triceps 30x BTB BTB 30x  IR/ER30x BTB 30x   Triceps 30x BTB BTB 30x   Triceps 30x BTB BTB 30x   Triceps 30x 12 5# CC BTB 30x   Triceps 30x 12 5# CC   Serratus punch  Body blade 4*20 sec Wall slides green loop 25x, Standing I, Y, T 4# weight     Bent over rows 2x10 4#      Serratus push up  Biceps curl, triceps kick back,  20x No monies 25x green loop Alternating shoulder taps 2x10  Alternating shoulder taps 2x10     Shrugs  20x       Tricep ext          Thoracic extensions  Standing I, T, Y 5# 20x ea 9# KB throws into flexion both hands 2*10 9# KB throws into flexion both hands 2*10     Thoracic rotations  Single arm KB row 20x LUE, RUE       Thera Ex         Pulleys  Seated flexion x 5'  Seated scaption x 5' Seated flexion x 5'  Seated scaption x 5' Seated flexion x 5' seated scaption  X 5' Seated flexion x 5' Seated flexion x 5' Seated flexion x 5'   Shoulder PROM   Body blade all directions 3' 3' ea     Cane flex         Cane abd 45 deg         Cane ER   Supine 20x  manual     Cane extension  Standing x 30  Slide up into IR x 20 w SOS Standing x 30x Standing x 30x Standing x30    Ball circles Standing IR with SOS 2x10, 3s hold  Standing IR with SOS 20x 5 sec hold  Standing IR with SOS 20x 5 sec hold    AAROM wall walks Wall slides with loop 2x10 light loop Towel slides 30x  Cane extension, ER, flexion 20x Cane extension, ER, flexion 20x  Cane extension, ER, flexion 20x   Counter reach     D2 flex/ext with YTB x15 each  D2 flex/ext with YTB x15 each  D2 flex/ext with YTB x15 each    TB shouler IR/ER  GTB 2x10 each  No monies green loop 20 x No monies blue loop 20x No monies blue loop 20x No monies blue loop 20x No monies blue loop 20x   pec stretch Shoulder flex, abd, scap 2x10 3#      Shoulder flex, abd, scap 2x10 3#    Towel into wall Lat pulldown 7 5# CC 2x10  Ball rolls CW, CCW 20x       Thera Activity         Patient education         Posture education                                                      Modalities         Heat pre           Insurance:  AMA/CMS Eval/ Re-eval POC expires Jo-Ann Barros #/ Referral # Total   Visits  Start date  Expiration date Extension  Visit limitation? PT only or  PT+OT?  Co-Insurance   [employer]  7/6 9/6 32/61 AUTH REQUIRED 99 7/6   [employer] PT $10 Co-pay                                                                 AUTH #:  Date 7/6 7/8 7/11 7/13 7/18 7/21 7/26 7/28 8/1 8/4 8/9 8/11   Visits  Authed: 99 Used 1 1 1 1 1 1 1 1 1 1 1 1    Remaining  98 97 96 95 94 93 92 91 90 89 88 85            AUTH #:  Date 8/16 8/18 8/25 8/30 9/8 9/15 9/20 9/22 9/27 9/29 10/4 10/6   Visits  Authed: 99 Used 1 1 1 1 1 1 1 1 1 1 1 1    Remaining  84 83 82 81 80 79 78 77 76 75 74 73            AUTH #:  Date 8/16 8/18 8/25 8/30 9/6          Visits  Authed: 99 Used 1 1 1 1 1           Remaining  84 83 82 81 80

## 2022-10-11 ENCOUNTER — APPOINTMENT (OUTPATIENT)
Dept: PHYSICAL THERAPY | Facility: CLINIC | Age: 72
End: 2022-10-11

## 2022-10-13 ENCOUNTER — OFFICE VISIT (OUTPATIENT)
Dept: PHYSICAL THERAPY | Facility: CLINIC | Age: 72
End: 2022-10-13
Payer: COMMERCIAL

## 2022-10-13 DIAGNOSIS — S42.295D TRAUMATIC CLOSED NONDISPLACED FRACTURE OF ANATOMICAL NECK OF HUMERUS, LEFT, WITH ROUTINE HEALING, SUBSEQUENT ENCOUNTER: Primary | ICD-10-CM

## 2022-10-13 DIAGNOSIS — S42.295D OTHER CLOSED NONDISPLACED FRACTURE OF PROXIMAL END OF LEFT HUMERUS WITH ROUTINE HEALING, SUBSEQUENT ENCOUNTER: ICD-10-CM

## 2022-10-13 PROCEDURE — 97110 THERAPEUTIC EXERCISES: CPT

## 2022-10-13 NOTE — PROGRESS NOTES
PT Discharge    Today's date: 10/13/2022  Patient name: Paige Ayala  : 1950  MRN: [id number]  Referring provider: Ines Edmonds  Dx:   Encounter Diagnosis     ICD-10-CM    1  Traumatic closed nondisplaced fracture of anatomical neck of humerus, left, with routine healing, subsequent encounter  S42 295D    2  Other closed nondisplaced fracture of proximal end of left humerus with routine healing, subsequent encounter  S42 295D          Assessment  Assessment details: Patient is a 74 y o  Female who presents to skilled outpatient PT with referring diagnosis of other closed nondisplaced fracture of proximal end of left humerus with routine healing, subsequent encounter  Patient demonstrates improvements in ROM, strength, and overall functional mobility since starting PT intervention  She has met all her personal goals and wishes to be discharged from formal PT intervention, as she is also moving to [place]  Plan  Patient is to be discharged at this time with comprehensive HEP to continue strengthening while she moves to [place]  Goals  Short Term Goals (4 weeks):  MET as of   - Patient will be independent in basic HEP 2-3 weeks  - Patient will have 0/10 pain at rest  - Patient will demonstrate >1/3 improvement in MMT grade as applicable  - Patient functional goal: Patient will floss teeth independently  Long Term Goals (8 weeks): MET as of   - Patient will be independent in a comprehensive home exercise program  - Patient FOTO score will improve to 61/100  - Patient will self-report >75% improvement in function  - Patient functional goal: Patient will resume all self care activities independently and pain free       Updated goals per : MET as of 10/6  - Patient will be able to mop floors with <3/10 pain   - Patient will be able to lift 5lbs (the weight of 2 dinner plates) overhead with <2/10 pain   - Patient will be able to open a door with her L arm with <2/10 pain Subjective    History of Present Illness  - Mechanism of injury: Patient reports 1/10 pain in her shoulder today  She is able to wash her hair with ease  She is unable to reach behind her back, but it "alright with that " She is moving to [place] next month and wants to be discharged from formal PT intervention       Pain  - Current pain ratin/10  - At best pain ratin/10  - At worst pain ratin/10  - Location: L shoulder   - Alleviating factors: ice, Tylenol     Social Support  - Steps to enter house: 12  - Stairs in house: 3   - Bedroom/bathroom set up: second floor  - Lives in: multi-level house   - Lives with:        Objective   UE MMT  - L Shoulder Flexion: 4/5   R Shoulder Flexion: 5/5  - L Shoulder Extension: 4/5   R Shoulder Extension: 5/5  - L Shoulder Abduction: 4/5   R Shoulder Abduction: 5/5  - L Shoulder  Adduction: 4/5   R Shoulder  Adduction: 5/5  - L Shoulder  IR: 4/5    R Shoulder  IR: 5/5  - L Shoulder  ER: 4/5    R Shoulder  ER: 5/5  - L Elbow Extension: 4/5   R Elbow Extension: 5/5   - L Elbow Flexion: 5/5    R Elbow Flexion: 5/5  - L Wrist Flexion: 5/5    R Wrist Flexion: 5/5  - L Wrist Extension: 5/5   R Wrist Extension: 5/5    Sensation  - Light touch: intact     Palpation   -TTP L biceps tendon origin, L proximal humerus, L UT     Shoulder AROM L R   Flexion 165 deg 180 deg   Extension 40 deg 40 deg   ER 70 deg 70 deg   IR 70 deg 70 deg   Abduction 160 deg 180 deg   Functional IR BTB C7 C7   Functional ER BTH L1 T12     Shoulder PROM L R   Flexion 165 deg 180 deg   Extension 40 deg 40 deg   ER 90 deg 90 deg   IR 75 deg 70 deg   Abduction 160 deg 180 deg     R handgrip: 32 lbs   L handgrip: 26 lbs      Precautions: L humeral fracture 6/3/2022 (wear sling and restore ROM as per MD recommendation)     Past Medical History:   Diagnosis Date   • Anxiety    • Arthritis    • Disease of thyroid gland    • Glaucoma     bilateral   • Osteoporosis    • SVT (supraventricular tachycardia) ([facility] [address] )    • Vitamin D deficiency        Date 10/13 9/22 9/27 9/29 10/4 10/6   Visit Number 27 22 23 24 25 26            Manual         GH mobs          AC mobs          Scap mobs          TPR PROM 8' 5' PROM 5' PROM 8' 8' 8'   STM         Thoracic mobs         Neuro Re-ed         Shoulder isos         Scap retract  BTB x30  BTB shoulder ext, 30x  BTB shoulder ext 30x BTB shoulder ext 30x BTB shoulder ext 30x BTB shoulder ext 30x   TB Rows BTB 30x   Triceps 30x BTB BTB 30x  IR/ER30x BTB 30x   Triceps 30x BTB BTB 30x   Triceps 30x BTB BTB 30x   Triceps 30x 12 5# CC BTB 30x   Triceps 30x 12 5# CC   Serratus punch  Body blade 4*20 sec Wall slides green loop 25x, Standing I, Y, T 4# weight     Bent over rows 2x10 4#      Serratus push up  Biceps curl, triceps kick back,  20x No monies 25x green loop Alternating shoulder taps 2x10  Alternating shoulder taps 2x10     Shrugs  20x       Tricep ext          Thoracic extensions  Standing I, T, Y 5# 20x ea 9# KB throws into flexion both hands 2*10 9# KB throws into flexion both hands 2*10     Thoracic rotations  Single arm KB row 20x NAY MAK       Thera Ex         Pulleys  Seated flexion x 5'  Seated scaption x 5' Seated flexion x 5'  Seated scaption x 5' Seated flexion x 5' seated scaption  X 5' Seated flexion x 5' Seated flexion x 5' Seated flexion x 5'   Shoulder PROM   Body blade all directions 3' 3' ea     Cane flex         Cane abd 45 deg         Cane ER   Supine 20x  manual     Cane extension  Standing x 30  Slide up into IR x 20 w SOS Standing x 30x Standing x 30x Standing x30    Ball circles Standing IR with SOS 2x10, 3s hold  Standing IR with SOS 20x 5 sec hold  Standing IR with SOS 20x 5 sec hold    AAROM wall walks Wall slides with loop 2x10 light loop Towel slides 30x  Cane extension, ER, flexion 20x Cane extension, ER, flexion 20x  Cane extension, ER, flexion 20x   Counter reach     D2 flex/ext with YTB x15 each  D2 flex/ext with YTB x15 each  D2 flex/ext with YTB x15 each    TB shouler IR/ER  GTB 2x10 each  No monies green loop 20 x No monies blue loop 20x No monies blue loop 20x No monies blue loop 20x No monies blue loop 20x   pec stretch Shoulder flex, abd, scap 2x10 3#      Shoulder flex, abd, scap 2x10 3#    Towel into wall Lat pulldown 7 5# CC 2x10  Ball rolls CW, CCW 20x       Thera Activity         Patient education         Posture education                                                      Modalities         Heat pre           Insurance:  AMA/CMS Eval/ Re-eval POC expires Therisa Gallus #/ Referral # Total   Visits  Start date  Expiration date Extension  Visit limitation? PT only or  PT+OT?  Co-Insurance   [employer]  7/6 9/6 32/61 AUTH REQUIRED 99 7/6   [employer]OMN PT $10 Co-pay                                                                 AUTH #:  Date 7/6 7/8 7/11 7/13 7/18 7/21 7/26 7/28 8/1 8/4 8/9 8/11   Visits  Authed: 99 Used 1 1 1 1 1 1 1 1 1 1 1 1    Remaining  98 97 96 95 94 93 92 91 90 89 88 85            AUTH #:  Date 8/16 8/18 8/25 8/30 9/8 9/15 9/20 9/22 9/27 9/29 10/4 10/6   Visits  Authed: 99 Used 1 1 1 1 1 1 1 1 1 1 1 1    Remaining  84 83 82 81 80 79 78 77 76 75 74 73            Evaline Dam #:  Date 8/16 8/18 8/25 8/30 9/6 10/13         Visits  Authed: 99 Used 1 1 1 1 1 1          Remaining  84 83 82 81 80 79

## 2022-10-18 ENCOUNTER — APPOINTMENT (OUTPATIENT)
Dept: PHYSICAL THERAPY | Facility: CLINIC | Age: 72
End: 2022-10-18

## 2022-10-20 ENCOUNTER — APPOINTMENT (OUTPATIENT)
Dept: PHYSICAL THERAPY | Facility: CLINIC | Age: 72
End: 2022-10-20

## 2022-10-28 ENCOUNTER — OFFICE VISIT (OUTPATIENT)
Dept: FAMILY MEDICINE CLINIC | Facility: CLINIC | Age: 72
End: 2022-10-28
Payer: COMMERCIAL

## 2022-10-28 VITALS
SYSTOLIC BLOOD PRESSURE: 128 MMHG | BODY MASS INDEX: 27.46 KG/M2 | OXYGEN SATURATION: 94 % | DIASTOLIC BLOOD PRESSURE: 72 MMHG | HEART RATE: 61 BPM | HEIGHT: 63 IN | TEMPERATURE: 97.9 F | RESPIRATION RATE: 16 BRPM | WEIGHT: 155 LBS

## 2022-10-28 DIAGNOSIS — R53.83 OTHER FATIGUE: ICD-10-CM

## 2022-10-28 DIAGNOSIS — E03.9 ACQUIRED HYPOTHYROIDISM: ICD-10-CM

## 2022-10-28 DIAGNOSIS — S42.292A OTHER CLOSED DISPLACED FRACTURE OF PROXIMAL END OF LEFT HUMERUS, INITIAL ENCOUNTER: Primary | ICD-10-CM

## 2022-10-28 DIAGNOSIS — F51.01 PRIMARY INSOMNIA: ICD-10-CM

## 2022-10-28 DIAGNOSIS — Z53.20 REFUSAL OF STATIN MEDICATION BY PATIENT: ICD-10-CM

## 2022-10-28 DIAGNOSIS — E78.2 MIXED HYPERLIPIDEMIA: ICD-10-CM

## 2022-10-28 PROCEDURE — 99214 OFFICE O/P EST MOD 30 MIN: CPT | Performed by: FAMILY MEDICINE

## 2022-10-28 NOTE — PROGRESS NOTES
Assessment/Plan:    1  Other closed displaced fracture of proximal end of left humerus, initial encounter  -     DXA bone density spine hip and pelvis; Future; Expected date: 10/28/2022    2  Other fatigue    3  Acquired hypothyroidism    4  Mixed hyperlipidemia  Assessment & Plan:  Offered statin pt does not want to be on statin  Pt advised on low fat low cholesterol diet      5  Refusal of statin medication by patient    6  Primary insomnia  Assessment & Plan:  Advised on sleep retraining              There are no Patient Instructions on file for this visit  No follow-ups on file  Subjective:      Patient ID: Tom Walsh is a 74 y o  female  Chief Complaint   Patient presents with   • Re-Establish   • Fatigue     Spiraling downhill after breaking bone   Mz cma   • Labwork     Needs explanation of results since never explained  Pt is here to follow up  Pt states she had labs done states she would like it explained to her  States she lives in [place] 6 months and lives in the [place] 6 months  States she is very fatigued  States every day she she takes a 30 min nap  States does not eat meat  Labs were ordered here for fatigue  Pt does not fall asleep while reading or doing activities  She goes to [place] at 12/1 and wakes up at 6 licha  Pt states she has an irreg heart beat and states she had an ablation in the p(ast  She was given trazodone - states it works great but makes her heart race    Pt states her WBC count is always low for the last 20 years    Pt denies snoring    Leaving for [place] in 8 days      The following portions of the patient's history were reviewed and updated as appropriate: allergies, current medications, past family history, past medical history, past social history, past surgical history and problem list     Review of Systems   Constitutional: Positive for fatigue           Current Outpatient Medications   Medication Sig Dispense Refill   • ascorbic acid (VITAMIN C) 250 mg tablet Take 500 mg by mouth daily     • bimatoprost (LUMIGAN) 0 01 % ophthalmic drops Apply 1 drop to eye Daily     • cholecalciferol (VITAMIN D3) 1,000 units tablet Take 5,000 Units by mouth daily Takes 5000 units twice daily  • Cholecalciferol (Vitamin D3) 50 MCG (2000 UT) TABS Take 2 tablets (4,000 Units total) by mouth daily (Patient taking differently: Take 2,000 Units by mouth daily 5000 daily) 90 tablet 3   • Jublia 10 % SOLN      • levothyroxine 75 mcg tablet Take 1 tablet (75 mcg total) by mouth daily 30 tablet 0   • Multiple Vitamin (multivitamin) tablet Take 1 tablet by mouth daily     • bisoprolol (ZEBETA) 10 MG tablet Take 2 5 tablets (25 mg total) by mouth daily as needed (SVT) (Patient not taking: Reported on 10/28/2022) 30 tablet 0   • meclizine (ANTIVERT) 25 mg tablet Take 1 tablet (25 mg total) by mouth 3 (three) times a day as needed for dizziness (Patient not taking: No sig reported) 30 tablet 0     No current facility-administered medications for this visit  Objective:    /72   Pulse 61   Temp 97 9 °F (36 6 °C)   Resp 16   Ht 5' 3" (1 6 m)   Wt 70 3 kg (155 lb)   LMP 01/20/2000 (Approximate)   SpO2 94%   BMI 27 46 kg/m²        Physical Exam  Vitals and nursing note reviewed  Constitutional:       General: She is not in acute distress  Appearance: She is well-developed  She is not diaphoretic  HENT:      Head: Normocephalic and atraumatic  Right Ear: External ear normal       Left Ear: External ear normal       Nose: Nose normal       Mouth/Throat:      Pharynx: No oropharyngeal exudate  Eyes:      General: No scleral icterus  Right eye: No discharge  Left eye: No discharge  Pupils: Pupils are equal, round, and reactive to light  Neck:      Thyroid: No thyromegaly  Cardiovascular:      Rate and Rhythm: Normal rate  Heart sounds: Normal heart sounds  No murmur heard    Pulmonary:      Effort: Pulmonary effort is normal  No respiratory distress  Breath sounds: Normal breath sounds  No wheezing  Abdominal:      General: Bowel sounds are normal  There is no distension  Palpations: Abdomen is soft  There is no mass  Tenderness: There is no abdominal tenderness  There is no guarding or rebound  Musculoskeletal:         General: Normal range of motion  Skin:     General: Skin is warm and dry  Findings: No erythema or rash  Neurological:      Mental Status: She is alert        Coordination: Coordination normal       Deep Tendon Reflexes: Reflexes normal    Psychiatric:         Behavior: Behavior normal                 Miesha Alegria

## 2023-03-10 ENCOUNTER — TELEPHONE (OUTPATIENT)
Dept: FAMILY MEDICINE CLINIC | Facility: CLINIC | Age: 73
End: 2023-03-10

## 2023-03-10 NOTE — TELEPHONE ENCOUNTER
I called the patient to schedule her for awv as she is due  Patient says she lives in [place] 6 months and [place] 6 months  She says she already did her Patient is due for AWV  In [place]  She wishes to keep Jovita Logan as her PCP     Claudine Barcenas MA

## 2023-04-06 ENCOUNTER — OFFICE VISIT (OUTPATIENT)
Dept: FAMILY MEDICINE CLINIC | Facility: CLINIC | Age: 73
End: 2023-04-06

## 2023-04-06 VITALS
OXYGEN SATURATION: 98 % | HEIGHT: 63 IN | DIASTOLIC BLOOD PRESSURE: 70 MMHG | HEART RATE: 68 BPM | WEIGHT: 154 LBS | TEMPERATURE: 97.3 F | BODY MASS INDEX: 27.29 KG/M2 | RESPIRATION RATE: 16 BRPM | SYSTOLIC BLOOD PRESSURE: 110 MMHG

## 2023-04-06 DIAGNOSIS — I47.1 SVT (SUPRAVENTRICULAR TACHYCARDIA) (HCC): ICD-10-CM

## 2023-04-06 DIAGNOSIS — N64.4 BREAST PAIN, LEFT: Primary | ICD-10-CM

## 2023-04-06 DIAGNOSIS — D70.8 OTHER NEUTROPENIA (HCC): ICD-10-CM

## 2023-04-06 NOTE — PROGRESS NOTES
Name: Bibi Flynn      : 1950      MRN: [id number]  Encounter Provider: Alma Delia Childs MD  Encounter Date: 2023   Encounter department: [facility]     1  Breast pain, left  Assessment & Plan:  Pain has resolved on its own  Given the timing and location, I believe it was in relation to the skin lesions that were frozen off by her dermatologist  For now she will monitor, if pain does return, will obtain imaging  She is up to date on her mammogram which showed no abnormalities  2  Other neutropenia (HCC)  Comments:  Stable  3  SVT (supraventricular tachycardia) ([facility])  Comments:  Stable  No recent episodes  Subjective      HPI   Cedric Law is here today because she noticed pain below her left breast yesterday  She got skin lesions frozen off by her dermatologist below her left breast prior to her symptoms starting so thinks it may be related  The pain has since resolved  Denies chest pain, shortness of breath, headaches, dizziness, skin discoloration/nipple changes, lumps in the area  Review of Systems   Constitutional: Negative  HENT: Negative  Eyes: Negative  Respiratory: Negative  Cardiovascular: Negative  Gastrointestinal: Negative  Endocrine: Negative  Genitourinary: Negative  Musculoskeletal: Negative  Skin: Negative  Allergic/Immunologic: Negative  Neurological: Negative  Hematological: Negative  Psychiatric/Behavioral: Negative  Current Outpatient Medications on File Prior to Visit   Medication Sig   • ascorbic acid (VITAMIN C) 250 mg tablet Take 500 mg by mouth daily   • bimatoprost (LUMIGAN) 0 01 % ophthalmic drops Apply 1 drop to eye Daily   • cholecalciferol (VITAMIN D3) 1,000 units tablet Take 5,000 Units by mouth daily Takes 5000 units twice daily     • Cholecalciferol (Vitamin D3) 50 MCG (2000 UT) TABS Take 2 tablets (4,000 Units total) by mouth daily (Patient taking differently: Take "2,000 Units by mouth daily 5000 daily)   • levothyroxine 75 mcg tablet Take 1 tablet (75 mcg total) by mouth daily   • Multiple Vitamin (multivitamin) tablet Take 1 tablet by mouth daily   • ascorbic acid (VITAMIN C) 250 mg tablet Take 500 mg by mouth daily   • bimatoprost (LUMIGAN) 0 01 % ophthalmic drops Apply 1 drop to eye Daily   • bisoprolol (ZEBETA) 10 MG tablet Take 2 5 tablets (25 mg total) by mouth daily as needed (SVT) (Patient not taking: Reported on 10/28/2022)   • Cholecalciferol (Vitamin D3) 50 MCG (2000 UT) TABS Take 2 tablets (4,000 Units total) by mouth daily (Patient taking differently: Take 2,000 Units by mouth daily 5000 daily)   • Jublia 10 % SOLN  (Patient not taking: Reported on 4/6/2023)   • levothyroxine 75 mcg tablet Take 1 tablet (75 mcg total) by mouth daily   • meclizine (ANTIVERT) 25 mg tablet Take 1 tablet (25 mg total) by mouth 3 (three) times a day as needed for dizziness (Patient not taking: Reported on 6/29/2022)   • Multiple Vitamin (multivitamin) tablet Take 1 tablet by mouth daily       Objective     /70   Pulse 68   Temp (!) 97 3 °F (36 3 °C)   Resp 16   Ht 5' 3\" (1 6 m)   Wt 69 9 kg (154 lb)   LMP 01/20/2000 (Approximate)   SpO2 98%   BMI 27 28 kg/m²     Physical Exam  Constitutional:       General: She is not in acute distress  Appearance: She is well-developed  She is not diaphoretic  HENT:      Head: Normocephalic and atraumatic  Right Ear: Tympanic membrane, ear canal and external ear normal  There is no impacted cerumen  Left Ear: Tympanic membrane, ear canal and external ear normal  There is no impacted cerumen  Nose: Nose normal  No congestion or rhinorrhea  Mouth/Throat:      Mouth: Mucous membranes are moist       Pharynx: Oropharynx is clear  No oropharyngeal exudate or posterior oropharyngeal erythema  Eyes:      General: No scleral icterus  Right eye: No discharge  Left eye: No discharge        " Conjunctiva/sclera: Conjunctivae normal    Cardiovascular:      Rate and Rhythm: Normal rate and regular rhythm  Heart sounds: Normal heart sounds  No murmur heard  No friction rub  No gallop  Pulmonary:      Effort: Pulmonary effort is normal  No respiratory distress  Breath sounds: Normal breath sounds  No wheezing or rales  Chest:      Chest wall: No tenderness  Breasts:     Left: No swelling, bleeding, inverted nipple, mass, nipple discharge, skin change or tenderness  Musculoskeletal:         General: No deformity  Normal range of motion  Cervical back: Normal range of motion and neck supple  Skin:     General: Skin is warm and dry  Neurological:      Mental Status: She is alert and oriented to person, place, and time  Psychiatric:         Behavior: Behavior normal          Thought Content:  Thought content normal          Judgment: Judgment normal        Theo Jones MD

## 2023-04-06 NOTE — ASSESSMENT & PLAN NOTE
Pain has resolved on its own  Given the timing and location, I believe it was in relation to the skin lesions that were frozen off by her dermatologist  For now she will monitor, if pain does return, will obtain imaging  She is up to date on her mammogram which showed no abnormalities

## 2023-05-08 ENCOUNTER — OFFICE VISIT (OUTPATIENT)
Dept: CARDIOLOGY CLINIC | Facility: CLINIC | Age: 73
End: 2023-05-08

## 2023-05-08 VITALS
SYSTOLIC BLOOD PRESSURE: 116 MMHG | BODY MASS INDEX: 27.64 KG/M2 | HEART RATE: 66 BPM | HEIGHT: 63 IN | OXYGEN SATURATION: 98 % | DIASTOLIC BLOOD PRESSURE: 68 MMHG | WEIGHT: 156 LBS

## 2023-05-08 DIAGNOSIS — E78.2 ELEVATED TRIGLYCERIDES WITH HIGH CHOLESTEROL: ICD-10-CM

## 2023-05-08 DIAGNOSIS — E78.2 MIXED HYPERLIPIDEMIA: ICD-10-CM

## 2023-05-08 DIAGNOSIS — I47.1 SVT (SUPRAVENTRICULAR TACHYCARDIA) (HCC): Primary | ICD-10-CM

## 2023-05-08 RX ORDER — OMEGA-3-ACID ETHYL ESTERS 1 G/1
2 CAPSULE, LIQUID FILLED ORAL 2 TIMES DAILY
Qty: 360 CAPSULE | Refills: 1 | Status: SHIPPED | OUTPATIENT
Start: 2023-05-08

## 2023-05-08 NOTE — PATIENT INSTRUCTIONS

## 2023-05-08 NOTE — PROGRESS NOTES
[facility]  [address]   [place], [address]  Cardiology Consultation    Saran John  [id number]  1950      Consult for:SVT  Appreciate consult by: PANCHO Acuna    1  SVT (supraventricular tachycardia) (HCC)  POCT ECG    CT coronary calcium score    Echo complete w/ contrast if indicated    Lipoprotein NMR      2  Mixed hyperlipidemia  POCT ECG    omega-3-acid ethyl esters (LOVAZA) 1 g capsule    CT coronary calcium score    Echo complete w/ contrast if indicated    Lipoprotein NMR      3  Elevated triglycerides with high cholesterol  omega-3-acid ethyl esters (LOVAZA) 1 g capsule    CT coronary calcium score    Echo complete w/ contrast if indicated    Lipoprotein NMR         Discussion/Summary:   SVT-resting twelve-lead EKG shows normal sinus rhythm  Denies major breakthroughs  She is currently on as needed bisoprolol  We will obtain an echo 2D to evaluate ejection fraction and rule out major valvular abnormalities  Hyperlipidemia with elevated triglycerides-we will try omega-3 2000 mg twice a day  Plan for coronary calcium score to help risk prognosticate    Hypothyroidism currently on levothyroxine 75 mcg daily    HPI:   64-year-old with a previous history of SVT status post ablation presents for initial consultation  She states currently having infrequent bouts of SVT  She states having 1 every 3 months lasting a couple hours  She takes half a tablet of bisoprolol which helps terminate the rhythm  She denies having major palpitations  She denies having chest heaviness  She is trying to make dietary modifications to lower her triglycerides  Reviewed through her prior records  PMH  Paroxysmal SVT-previous history of SVT ablation in [place]  She was previously on flecainide twice a day    She is currently on as needed beta-blocker    Past Medical History:   Diagnosis Date   • Anxiety    • Arthritis    • Disease of thyroid gland    • Glaucoma     bilateral   • Osteoporosis    • SVT (supraventricular tachycardia) (HCC)    • Vitamin D deficiency      Social History     Socioeconomic History   • Marital status: /Civil Union     Spouse name: Not on file   • Number of children: Not on file   • Years of education: Not on file   • Highest education level: Not on file   Occupational History   • Not on file   Tobacco Use   • Smoking status: Never   • Smokeless tobacco: Never   Vaping Use   • Vaping Use: Never used   Substance and Sexual Activity   • Alcohol use: No   • Drug use: No   • Sexual activity: Not on file   Other Topics Concern   • Not on file   Social History Narrative   • Not on file     Social Determinants of Health     Financial Resource Strain: Not on file   Food Insecurity: Not on file   Transportation Needs: Not on file   Physical Activity: Not on file   Stress: Not on file   Social Connections: Not on file   Intimate Partner Violence: Not on file   Housing Stability: Not on file      Family History   Problem Relation Age of Onset   • Arthritis Mother    • Arthritis Father    • Cancer Sister      Past Surgical History:   Procedure Laterality Date   • BREAST LUMPECTOMY     • CARDIAC ELECTROPHYSIOLOGY STUDY AND ABLATION     •  SECTION         Current Outpatient Medications:   •  ascorbic acid (VITAMIN C) 250 mg tablet, Take 500 mg by mouth daily, Disp: , Rfl:   •  bimatoprost (LUMIGAN) 0 01 % ophthalmic drops, Apply 1 drop to eye Daily, Disp: , Rfl:   •  Cholecalciferol (Vitamin D3) 50 MCG (2000 UT) TABS, Take 2 tablets (4,000 Units total) by mouth daily (Patient taking differently: Take 2,000 Units by mouth daily 5000 daily), Disp: 90 tablet, Rfl: 3  •  levothyroxine 75 mcg tablet, Take 1 tablet (75 mcg total) by mouth daily, Disp: 30 tablet, Rfl: 0  •  Multiple Vitamin (multivitamin) tablet, Take 1 tablet by mouth daily, Disp: , Rfl:   •  bisoprolol (ZEBETA) 10 MG tablet, Take 2 5 tablets (25 mg total) by mouth "daily as needed (SVT) (Patient not taking: Reported on 10/28/2022), Disp: 30 tablet, Rfl: 0  •  cholecalciferol (VITAMIN D3) 1,000 units tablet, Take 5,000 Units by mouth daily Takes 5000 units twice daily  (Patient not taking: Reported on 5/8/2023), Disp: , Rfl:   •  Jublia 10 % SOLN, , Disp: , Rfl:   •  meclizine (ANTIVERT) 25 mg tablet, Take 1 tablet (25 mg total) by mouth 3 (three) times a day as needed for dizziness (Patient not taking: Reported on 6/29/2022), Disp: 30 tablet, Rfl: 0  No Known Allergies  Vitals:    05/08/23 1052   BP: 116/68   BP Location: Right arm   Patient Position: Sitting   Cuff Size: Standard   Pulse: 66   SpO2: 98%   Weight: 70 8 kg (156 lb)   Height: 5' 3\" (1 6 m)       Review of Systems:   Review of Systems   Constitutional: Negative  Negative for activity change, appetite change, chills, diaphoresis, fatigue, fever and unexpected weight change  HENT: Negative  Negative for congestion, dental problem, drooling, ear discharge, ear pain, facial swelling, hearing loss, mouth sores, nosebleeds, postnasal drip, rhinorrhea, sinus pressure, sinus pain, sneezing, sore throat, tinnitus, trouble swallowing and voice change  Eyes: Negative  Negative for photophobia, pain, redness, itching and visual disturbance  Respiratory: Negative  Negative for apnea, cough, choking, chest tightness, shortness of breath, wheezing and stridor  Cardiovascular: Positive for palpitations  Negative for chest pain and leg swelling  Gastrointestinal: Negative  Negative for abdominal distention, abdominal pain, anal bleeding, blood in stool, constipation, diarrhea, nausea, rectal pain and vomiting  Endocrine: Negative  Negative for cold intolerance, heat intolerance, polydipsia, polyphagia and polyuria  Genitourinary: Negative    Negative for decreased urine volume, difficulty urinating, dyspareunia, dysuria, enuresis, flank pain, frequency, genital sores, hematuria, menstrual problem, pelvic " "pain, urgency, vaginal bleeding, vaginal discharge and vaginal pain  Musculoskeletal: Negative  Negative for arthralgias, back pain, gait problem, joint swelling, myalgias, neck pain and neck stiffness  Skin: Negative  Negative for color change, pallor, rash and wound  Allergic/Immunologic: Negative  Negative for environmental allergies, food allergies and immunocompromised state  Neurological: Negative  Negative for dizziness, tremors, seizures, syncope, facial asymmetry, speech difficulty, weakness, light-headedness, numbness and headaches  Hematological: Negative  Negative for adenopathy  Does not bruise/bleed easily  Psychiatric/Behavioral: Negative  Negative for agitation, behavioral problems, confusion, decreased concentration, dysphoric mood, hallucinations, self-injury, sleep disturbance and suicidal ideas  The patient is not nervous/anxious and is not hyperactive  All other systems reviewed and are negative  Vitals:    05/08/23 1052   BP: 116/68   BP Location: Right arm   Patient Position: Sitting   Cuff Size: Standard   Pulse: 66   SpO2: 98%   Weight: 70 8 kg (156 lb)   Height: 5' 3\" (1 6 m)     Physical Examination:   Physical Exam  Constitutional:       General: She is not in acute distress  Appearance: She is well-developed  She is not diaphoretic  HENT:      Head: Normocephalic and atraumatic  Right Ear: External ear normal       Left Ear: External ear normal    Eyes:      General: No scleral icterus  Right eye: No discharge  Left eye: No discharge  Conjunctiva/sclera: Conjunctivae normal       Pupils: Pupils are equal, round, and reactive to light  Neck:      Thyroid: No thyromegaly  Vascular: No JVD  Trachea: No tracheal deviation  Cardiovascular:      Rate and Rhythm: Normal rate and regular rhythm  Heart sounds: No murmur heard  No friction rub  No gallop     Pulmonary:      Effort: Pulmonary effort is normal  No " respiratory distress  Breath sounds: Normal breath sounds  No stridor  No wheezing or rales  Chest:      Chest wall: No tenderness  Abdominal:      General: Bowel sounds are normal  There is no distension  Palpations: Abdomen is soft  There is no mass  Tenderness: There is no abdominal tenderness  There is no guarding or rebound  Musculoskeletal:         General: No tenderness or deformity  Normal range of motion  Cervical back: Normal range of motion and neck supple  Skin:     General: Skin is warm and dry  Coloration: Skin is not pale  Findings: No erythema or rash  Neurological:      Mental Status: She is alert and oriented to person, place, and time  Cranial Nerves: No cranial nerve deficit  Motor: No abnormal muscle tone  Coordination: Coordination normal       Deep Tendon Reflexes: Reflexes are normal and symmetric  Reflexes normal    Psychiatric:         Behavior: Behavior normal          Thought Content:  Thought content normal          Judgment: Judgment normal          Labs:     Lab Results   Component Value Date    WBC 3 47 (L) 08/11/2022    HGB 13 2 08/11/2022    HCT 39 0 08/11/2022    MCV 95 08/11/2022    RDW 11 9 08/11/2022     08/11/2022     BMP:  Lab Results   Component Value Date    SODIUM 141 08/11/2022    K 4 0 08/11/2022     (H) 08/11/2022    CO2 26 08/11/2022    BUN 11 08/11/2022    CREATININE 0 74 08/11/2022    GLUC 87 11/29/2018    GLUF 97 08/11/2022    CALCIUM 9 0 08/11/2022    EGFR 80 08/11/2022    MG 2 0 11/29/2018     LFT:  Lab Results   Component Value Date    AST 24 08/11/2022    ALT 28 08/11/2022    ALKPHOS 101 08/11/2022    TP 6 8 08/11/2022    ALB 3 5 08/11/2022      Lab Results   Component Value Date    KLW7RFTXAMIJ 3 760 08/11/2022     No results found for: HGBA1C  Lipid Profile:   Lab Results   Component Value Date    CHOLESTEROL 210 (H) 08/11/2022    HDL 51 08/11/2022    LDLCALC 116 (H) 08/11/2022    TRIG 216 (H) "08/11/2022     Lab Results   Component Value Date    CHOLESTEROL 210 (H) 08/11/2022    CHOLESTEROL 213 (H) 09/03/2021     Lab Results   Component Value Date    TROPONINI <0 02 11/29/2018     Lab Results   Component Value Date    NTBNP 259 (H) 11/28/2018      No results found for this or any previous visit (from the past 672 hour(s))  Imaging & Testing   I have personally reviewed pertinent reports  Cardiac Testing       EKG: Personally reviewed  Normal sinus rhythm with no preexcitation no acute ST-T wave changes      Kiarra Quiles MD [facility]  [phone]  Please call with any questions or suggestions    Counseling :  A description of the counseling:   Goals and Barriers:  Patient's ability to self care:  Medication side effect reviewed with patient in detail and all their questions answered  \"Portions of the record may have been created with voice recognition software  Occasional wrong word or \"sound a like\" substitutions may have occurred due to the inherent limitations of voice recognition software  Read the chart carefully and recognize, using context, where substitutions have occurred  Please call if you have any questions   \"    "

## 2023-05-16 ENCOUNTER — TELEPHONE (OUTPATIENT)
Dept: CARDIOLOGY CLINIC | Facility: CLINIC | Age: 73
End: 2023-05-16

## 2023-05-16 NOTE — TELEPHONE ENCOUNTER
Patient called asking if she is to continue her MV with Omega 3 in it  Also if she can continue her other supplements

## 2023-05-16 NOTE — TELEPHONE ENCOUNTER
Yes absolutely she can take multivitamin with omega 3  No contra-indication to other supplements  Omega 3 does have some blood thinning effect   Should avoid any natural supplements that thin blood No

## 2023-05-30 ENCOUNTER — TELEPHONE (OUTPATIENT)
Dept: CARDIOLOGY CLINIC | Facility: CLINIC | Age: 73
End: 2023-05-30

## 2023-06-10 ENCOUNTER — HOSPITAL ENCOUNTER (OUTPATIENT)
Dept: CT IMAGING | Facility: HOSPITAL | Age: 73
Discharge: HOME | End: 2023-06-10
Attending: INTERNAL MEDICINE
Payer: COMMERCIAL

## 2023-06-10 ENCOUNTER — HOSPITAL ENCOUNTER (OUTPATIENT)
Dept: NON INVASIVE DIAGNOSTICS | Facility: HOSPITAL | Age: 73
Discharge: HOME | End: 2023-06-10
Attending: INTERNAL MEDICINE
Payer: COMMERCIAL

## 2023-06-10 VITALS
WEIGHT: 156.09 LBS | BODY MASS INDEX: 27.66 KG/M2 | HEART RATE: 51 BPM | DIASTOLIC BLOOD PRESSURE: 68 MMHG | HEIGHT: 63 IN | SYSTOLIC BLOOD PRESSURE: 116 MMHG

## 2023-06-10 DIAGNOSIS — E78.2 MIXED HYPERLIPIDEMIA: ICD-10-CM

## 2023-06-10 DIAGNOSIS — I47.1 SVT (SUPRAVENTRICULAR TACHYCARDIA) (HCC): ICD-10-CM

## 2023-06-10 DIAGNOSIS — E78.2 ELEVATED TRIGLYCERIDES WITH HIGH CHOLESTEROL: ICD-10-CM

## 2023-06-10 PROCEDURE — 93306 TTE W/DOPPLER COMPLETE: CPT | Performed by: INTERNAL MEDICINE

## 2023-06-10 PROCEDURE — 75571 CT HRT W/O DYE W/CA TEST: CPT

## 2023-06-10 PROCEDURE — G1004 CDSM NDSC: HCPCS

## 2023-06-10 PROCEDURE — 93306 TTE W/DOPPLER COMPLETE: CPT

## 2023-06-11 LAB
AORTIC ROOT: 3.2 CM
APICAL FOUR CHAMBER EJECTION FRACTION: 55 %
ASCENDING AORTA: 3.5 CM
AV REGURGITATION PRESSURE HALF TIME: 936 MS
E WAVE DECELERATION TIME: 243 MS
FRACTIONAL SHORTENING: 28 (ref 28–44)
INTERVENTRICULAR SEPTUM IN DIASTOLE (PARASTERNAL SHORT AXIS VIEW): 1.1 CM
INTERVENTRICULAR SEPTUM: 1.1 CM (ref 0.6–1.1)
LAAS-AP2: 15.9 CM2
LAAS-AP4: 18.4 CM2
LEFT ATRIUM SIZE: 3.5 CM
LEFT INTERNAL DIMENSION IN SYSTOLE: 2.9 CM (ref 2.1–4)
LEFT VENTRICULAR INTERNAL DIMENSION IN DIASTOLE: 4 CM (ref 3.5–6)
LEFT VENTRICULAR POSTERIOR WALL IN END DIASTOLE: 1 CM
LEFT VENTRICULAR STROKE VOLUME: 35 ML
LVSV (TEICH): 35 ML
MV E'TISSUE VEL-SEP: 7 CM/S
MV PEAK A VEL: 0.94 M/S
MV PEAK E VEL: 55 CM/S
MV STENOSIS PRESSURE HALF TIME: 70 MS
MV VALVE AREA P 1/2 METHOD: 3.14
RA PRESSURE ESTIMATED: 3 MMHG
RIGHT ATRIUM AREA SYSTOLE A4C: 17.3 CM2
RIGHT VENTRICLE ID DIMENSION: 4 CM
RV PSP: 28 MMHG
SL CV AV DECELERATION TIME RETROGRADE: 3226 MS
SL CV AV PEAK GRADIENT RETROGRADE: 71 MMHG
SL CV LEFT ATRIUM LENGTH A2C: 4.6 CM
SL CV LV EF: 60
SL CV PED ECHO LEFT VENTRICLE DIASTOLIC VOLUME (MOD BIPLANE) 2D: 68 ML
SL CV PED ECHO LEFT VENTRICLE SYSTOLIC VOLUME (MOD BIPLANE) 2D: 33 ML
TR MAX PG: 25 MMHG
TR PEAK VELOCITY: 2.5 M/S
TRICUSPID ANNULAR PLANE SYSTOLIC EXCURSION: 2.7 CM
TRICUSPID VALVE PEAK REGURGITATION VELOCITY: 2.52 M/S

## 2023-06-14 ENCOUNTER — TELEPHONE (OUTPATIENT)
Dept: CARDIOLOGY CLINIC | Facility: CLINIC | Age: 73
End: 2023-06-14

## 2023-06-15 ENCOUNTER — TELEPHONE (OUTPATIENT)
Dept: CARDIOLOGY CLINIC | Facility: CLINIC | Age: 73
End: 2023-06-15

## 2023-06-15 NOTE — TELEPHONE ENCOUNTER
----- Message from Raffy Hall MD sent at 6/15/2023  8:55 AM EDT -----  Overall heart function is strong  No major valve abnormalities  Her coronary calcium score was 0  This is excellent  There were some incidental findings on CT which I will discuss on follow-up   No change in management

## 2023-06-15 NOTE — TELEPHONE ENCOUNTER
Pt made aware of Echo and CT Calc Score  Pt would like to be put in schedule before October  Sent a task to clerical to assist with scheduling  Pt verbalized understanding

## 2023-06-16 ENCOUNTER — APPOINTMENT (OUTPATIENT)
Dept: RADIOLOGY | Facility: CLINIC | Age: 73
End: 2023-06-16
Payer: COMMERCIAL

## 2023-06-16 ENCOUNTER — OFFICE VISIT (OUTPATIENT)
Dept: OBGYN CLINIC | Facility: CLINIC | Age: 73
End: 2023-06-16
Payer: COMMERCIAL

## 2023-06-16 VITALS
HEART RATE: 60 BPM | HEIGHT: 63 IN | BODY MASS INDEX: 27.64 KG/M2 | SYSTOLIC BLOOD PRESSURE: 122 MMHG | DIASTOLIC BLOOD PRESSURE: 74 MMHG | WEIGHT: 156 LBS

## 2023-06-16 DIAGNOSIS — M25.562 PAIN IN BOTH KNEES, UNSPECIFIED CHRONICITY: ICD-10-CM

## 2023-06-16 DIAGNOSIS — M17.0 PRIMARY OSTEOARTHRITIS OF BOTH KNEES: ICD-10-CM

## 2023-06-16 DIAGNOSIS — M25.561 PAIN IN BOTH KNEES, UNSPECIFIED CHRONICITY: Primary | ICD-10-CM

## 2023-06-16 DIAGNOSIS — M25.562 PAIN IN BOTH KNEES, UNSPECIFIED CHRONICITY: Primary | ICD-10-CM

## 2023-06-16 DIAGNOSIS — M25.561 PAIN IN BOTH KNEES, UNSPECIFIED CHRONICITY: ICD-10-CM

## 2023-06-16 PROCEDURE — 99213 OFFICE O/P EST LOW 20 MIN: CPT | Performed by: ORTHOPAEDIC SURGERY

## 2023-06-16 PROCEDURE — 73564 X-RAY EXAM KNEE 4 OR MORE: CPT

## 2023-06-16 NOTE — PROGRESS NOTES
"Ortho Sports Medicine New Patient Visit     Assesment:   74 y o  female with bilateral knee OA    Plan: We had a long discussion regarding bilateral knee degenerative joint disease including options for treatment  I recommended starting with conservative management options  I offered a referral to physical therapy as I do believe this will help with strength and mobility, and may improved symptoms significantly  Patient does not want to go to PT so she was shown several exercises to do at home with focus on Hip and quad strengthening  She may use Glucosamine if she finds it beneficial  I explained that some people do experience good relief with this supplement  Turmeric supplements has been shown to be helpful for joint pain as well and she may consider that  I recommended Advil or Aleve as needed for pain  We discussed options for injections including corticosteroids, viscosupplementation, and biologics including risks and benefits of each  She prefers to hold off on these today and will return around October for recheck  Chief Complaint   Patient presents with   • Left Knee - Pain   • Right Knee - Pain     History of Present Illness: The patient is a 74 y o  female with bilateral knee pain  Patient reports she was told 15 years ago that she has arthritis in both knees, however she has never had any treatment  She notes her knees \"snap, crackle, and pop\" constantly  She notes it is very painful to do stairs or any up and down movements  She reports she lives in [place] half of the year, and since they came back, her knees have been worse  She has tried Voltaren gel with no relief  Patient states she would just like to know which glucosamine to take and which foods to avoid      Knee Surgical History:  None    Past Medical, Social and Family History:  Past Medical History:   Diagnosis Date   • Anxiety    • Arthritis    • Disease of thyroid gland    • Glaucoma     bilateral   • Osteoporosis    • SVT " (supraventricular tachycardia) ([facility] [address] )    • Vitamin D deficiency      Past Surgical History:   Procedure Laterality Date   • BREAST LUMPECTOMY     • CARDIAC ELECTROPHYSIOLOGY STUDY AND ABLATION     •  SECTION       No Known Allergies  Current Outpatient Medications on File Prior to Visit   Medication Sig Dispense Refill   • ascorbic acid (VITAMIN C) 250 mg tablet Take 500 mg by mouth daily     • bimatoprost (LUMIGAN) 0 01 % ophthalmic drops Apply 1 drop to eye Daily     • Cholecalciferol (Vitamin D3) 50 MCG (2000 UT) TABS Take 2 tablets (4,000 Units total) by mouth daily (Patient taking differently: Take 2,000 Units by mouth daily 5000 daily) 90 tablet 3   • levothyroxine 75 mcg tablet Take 1 tablet (75 mcg total) by mouth daily 30 tablet 0   • bisoprolol (ZEBETA) 10 MG tablet Take 2 5 tablets (25 mg total) by mouth daily as needed (SVT) (Patient not taking: Reported on 10/28/2022) 30 tablet 0   • cholecalciferol (VITAMIN D3) 1,000 units tablet Take 5,000 Units by mouth daily Takes 5000 units twice daily  (Patient not taking: Reported on 2023)     • Jublia 10 % SOLN  (Patient not taking: Reported on 2023)     • meclizine (ANTIVERT) 25 mg tablet Take 1 tablet (25 mg total) by mouth 3 (three) times a day as needed for dizziness (Patient not taking: Reported on 2022) 30 tablet 0   • Multiple Vitamin (multivitamin) tablet Take 1 tablet by mouth daily     • omega-3-acid ethyl esters (LOVAZA) 1 g capsule Take 2 capsules (2 g total) by mouth 2 (two) times a day (Patient not taking: Reported on 2023) 360 capsule 1     No current facility-administered medications on file prior to visit       Social History     Socioeconomic History   • Marital status: /Civil Union     Spouse name: Not on file   • Number of children: Not on file   • Years of education: Not on file   • Highest education level: Not on file   Occupational History   • Not on file   Tobacco Use   • Smoking status: Never   • Smokeless "tobacco: Never   Vaping Use   • Vaping Use: Never used   Substance and Sexual Activity   • Alcohol use: No   • Drug use: No   • Sexual activity: Not on file   Other Topics Concern   • Not on file   Social History Narrative   • Not on file     Social Determinants of Health     Financial Resource Strain: Not on file   Food Insecurity: Not on file   Transportation Needs: Not on file   Physical Activity: Not on file   Stress: Not on file   Social Connections: Not on file   Intimate Partner Violence: Not on file   Housing Stability: Not on file     I have reviewed the past medical, surgical, social and family history, medications and allergies as documented in the EMR  Review of systems: ROS is negative other than that noted in the HPI  Constitutional: Negative for fatigue and fever  HENT: Negative for sore throat  Respiratory: Negative for shortness of breath  Cardiovascular: Negative for chest pain  Gastrointestinal: Negative for abdominal pain  Endocrine: Negative for cold intolerance and heat intolerance  Genitourinary: Negative for flank pain  Musculoskeletal: Negative for back pain  Skin: Negative for rash  Allergic/Immunologic: Negative for immunocompromised state  Neurological: Negative for dizziness  intact  Psychiatric/Behavioral: Negative for agitation  Physical Exam:    Blood pressure 122/74, pulse 60, height 5' 3\" (1 6 m), weight 70 8 kg (156 lb), last menstrual period 01/20/2000  General/Constitutional: NAD, well developed, well nourished  HENT: Normocephalic, atraumatic  CV: Intact distal pulses, regular rate  Resp: No respiratory distress or labored breathing  Lymphatic: No lymphadenopathy palpated  Neuro: Alert and Oriented x 3, no focal deficits  Psych: Normal mood, normal affect  Skin: Warm, dry, no rashes, no erythema    Knee Exam:   No significant skin lesions or deformity  Range of motion from 0 to 125  Mild medial joint line tenderness   Knee is stable to varus " stress, valgus stress, Lachman, and posterior drawer  Neurovascularly intact distally  No effusion  Knee Imaging    X-rays of the knee reviewed and interpreted today  These show no acute fractures  Moderate bilateral osteoarthritis

## 2023-07-13 ENCOUNTER — OFFICE VISIT (OUTPATIENT)
Dept: CARDIOLOGY CLINIC | Facility: CLINIC | Age: 73
End: 2023-07-13
Payer: COMMERCIAL

## 2023-07-13 VITALS
BODY MASS INDEX: 27.64 KG/M2 | DIASTOLIC BLOOD PRESSURE: 58 MMHG | WEIGHT: 156 LBS | HEIGHT: 63 IN | HEART RATE: 64 BPM | SYSTOLIC BLOOD PRESSURE: 102 MMHG | OXYGEN SATURATION: 97 %

## 2023-07-13 DIAGNOSIS — E78.2 MIXED HYPERLIPIDEMIA: Primary | ICD-10-CM

## 2023-07-13 DIAGNOSIS — I47.1 SVT (SUPRAVENTRICULAR TACHYCARDIA) (HCC): ICD-10-CM

## 2023-07-13 PROCEDURE — 99214 OFFICE O/P EST MOD 30 MIN: CPT | Performed by: INTERNAL MEDICINE

## 2023-07-13 NOTE — PROGRESS NOTES
[facility]  [address] [place] [address]. [id number]   [place], [address] [facility]  Cardiology Consultation    Dontae Quevedo  [id number]  1950      Consult for:SVT  Appreciate consult by: PANCHO Mcguire    1. Mixed hyperlipidemia        2. SVT (supraventricular tachycardia) (HCC)           Discussion/Summary:   SVT-resting twelve-lead EKG shows normal sinus rhythm. Denies major breakthroughs. She is currently on as needed bisoprolol. Hyperlipidemia with elevated triglycerides- Plan for coronary calcium score to help risk prognosticate. Red yeast rice 1200-niacin. Thelma about long-term targeting of LDL below 100 and triglycerides below 150 at least but optimally less than 100. She is unable to tolerate omega-3. She will try red yeast rice 1200 mg and niacin. Hypothyroidism currently on levothyroxine 75 mcg daily    HPI:   78-year-old with a previous history of SVT status post ablation presents for initial consultation. She states currently having infrequent bouts of SVT. She states having 1 every 3 months lasting a couple hours. She takes half a tablet of bisoprolol which helps terminate the rhythm. She denies having major palpitations. She denies having chest heaviness. She is trying to make dietary modifications to lower her triglycerides. Reviewed through her prior records. PMH  Paroxysmal SVT-previous history of SVT ablation in [place]. She was previously on flecainide twice a day.   She is currently on as needed beta-blocker    Past Medical History:   Diagnosis Date   • Anxiety    • Arthritis    • Disease of thyroid gland    • Glaucoma     bilateral   • Osteoporosis    • SVT (supraventricular tachycardia) (HCC)    • Vitamin D deficiency      Social History     Socioeconomic History   • Marital status: /Civil Union     Spouse name: Not on file   • Number of children: Not on file   • Years of education: Not on file   • Highest education level: Not on file   Occupational History   • Not on file   Tobacco Use   • Smoking status: Never   • Smokeless tobacco: Never   Vaping Use   • Vaping Use: Never used   Substance and Sexual Activity   • Alcohol use: No   • Drug use: No   • Sexual activity: Not on file   Other Topics Concern   • Not on file   Social History Narrative   • Not on file     Social Determinants of Health     Financial Resource Strain: Not on file   Food Insecurity: Not on file   Transportation Needs: Not on file   Physical Activity: Not on file   Stress: Not on file   Social Connections: Not on file   Intimate Partner Violence: Not on file   Housing Stability: Not on file      Family History   Problem Relation Age of Onset   • Arthritis Mother    • Arthritis Father    • Cancer Sister      Past Surgical History:   Procedure Laterality Date   • BREAST LUMPECTOMY     • CARDIAC ELECTROPHYSIOLOGY STUDY AND ABLATION     •  SECTION         Current Outpatient Medications:   •  ascorbic acid (VITAMIN C) 250 mg tablet, Take 500 mg by mouth daily, Disp: , Rfl:   •  bimatoprost (LUMIGAN) 0.01 % ophthalmic drops, Apply 1 drop to eye Daily, Disp: , Rfl:   •  Cholecalciferol (Vitamin D3) 50 MCG (2000 UT) TABS, Take 2 tablets (4,000 Units total) by mouth daily (Patient taking differently: Take 2,000 Units by mouth daily 5000 daily), Disp: 90 tablet, Rfl: 3  •  levothyroxine 75 mcg tablet, Take 1 tablet (75 mcg total) by mouth daily, Disp: 30 tablet, Rfl: 0  •  Multiple Vitamin (multivitamin) tablet, Take 1 tablet by mouth daily, Disp: , Rfl:   •  bisoprolol (ZEBETA) 10 MG tablet, Take 2.5 tablets (25 mg total) by mouth daily as needed (SVT) (Patient not taking: Reported on 10/28/2022), Disp: 30 tablet, Rfl: 0  •  cholecalciferol (VITAMIN D3) 1,000 units tablet, Take 5,000 Units by mouth daily Takes 5000 units twice daily.  (Patient not taking: Reported on 2023), Disp: , Rfl:   •  Jublia 10 % SOLN, , Disp: , Rfl:   •  meclizine (ANTIVERT) 25 mg tablet, Take 1 tablet (25 mg total) by mouth 3 (three) times a day as needed for dizziness (Patient not taking: Reported on 6/29/2022), Disp: 30 tablet, Rfl: 0  •  omega-3-acid ethyl esters (LOVAZA) 1 g capsule, Take 2 capsules (2 g total) by mouth 2 (two) times a day (Patient not taking: Reported on 6/16/2023), Disp: 360 capsule, Rfl: 1  No Known Allergies  Vitals:    07/13/23 1257   BP: 102/58   BP Location: Right arm   Patient Position: Sitting   Cuff Size: Standard   Pulse: 64   SpO2: 97%   Weight: 70.8 kg (156 lb)   Height: 5' 3" (1.6 m)       Review of Systems:   Review of Systems   Constitutional: Negative. Negative for activity change, appetite change, chills, diaphoresis, fatigue, fever and unexpected weight change. HENT: Negative. Negative for congestion, dental problem, drooling, ear discharge, ear pain, facial swelling, hearing loss, mouth sores, nosebleeds, postnasal drip, rhinorrhea, sinus pressure, sinus pain, sneezing, sore throat, tinnitus, trouble swallowing and voice change. Eyes: Negative. Negative for photophobia, pain, redness, itching and visual disturbance. Respiratory: Negative. Negative for apnea, cough, choking, chest tightness, shortness of breath, wheezing and stridor. Cardiovascular: Positive for palpitations. Negative for chest pain and leg swelling. Gastrointestinal: Negative. Negative for abdominal distention, abdominal pain, anal bleeding, blood in stool, constipation, diarrhea, nausea, rectal pain and vomiting. Endocrine: Negative. Negative for cold intolerance, heat intolerance, polydipsia, polyphagia and polyuria. Genitourinary: Negative. Negative for decreased urine volume, difficulty urinating, dyspareunia, dysuria, enuresis, flank pain, frequency, genital sores, hematuria, menstrual problem, pelvic pain, urgency, vaginal bleeding, vaginal discharge and vaginal pain. Musculoskeletal: Negative.   Negative for arthralgias, back pain, gait problem, joint swelling, myalgias, neck pain and neck stiffness. Skin: Negative. Negative for color change, pallor, rash and wound. Allergic/Immunologic: Negative. Negative for environmental allergies, food allergies and immunocompromised state. Neurological: Negative. Negative for dizziness, tremors, seizures, syncope, facial asymmetry, speech difficulty, weakness, light-headedness, numbness and headaches. Hematological: Negative. Negative for adenopathy. Does not bruise/bleed easily. Psychiatric/Behavioral: Negative. Negative for agitation, behavioral problems, confusion, decreased concentration, dysphoric mood, hallucinations, self-injury, sleep disturbance and suicidal ideas. The patient is not nervous/anxious and is not hyperactive. All other systems reviewed and are negative. Vitals:    07/13/23 1257   BP: 102/58   BP Location: Right arm   Patient Position: Sitting   Cuff Size: Standard   Pulse: 64   SpO2: 97%   Weight: 70.8 kg (156 lb)   Height: 5' 3" (1.6 m)     Physical Examination:   Physical Exam  Constitutional:       General: She is not in acute distress. Appearance: She is well-developed. She is not diaphoretic. HENT:      Head: Normocephalic and atraumatic. Right Ear: External ear normal.      Left Ear: External ear normal.   Eyes:      General: No scleral icterus. Right eye: No discharge. Left eye: No discharge. Conjunctiva/sclera: Conjunctivae normal.      Pupils: Pupils are equal, round, and reactive to light. Neck:      Thyroid: No thyromegaly. Vascular: No JVD. Trachea: No tracheal deviation. Cardiovascular:      Rate and Rhythm: Normal rate and regular rhythm. Heart sounds: No murmur heard. No friction rub. No gallop. Pulmonary:      Effort: Pulmonary effort is normal. No respiratory distress. Breath sounds: Normal breath sounds. No stridor. No wheezing or rales. Chest:      Chest wall: No tenderness.    Abdominal:      General: Bowel sounds are normal. There is no distension. Palpations: Abdomen is soft. There is no mass. Tenderness: There is no abdominal tenderness. There is no guarding or rebound. Musculoskeletal:         General: No tenderness or deformity. Normal range of motion. Cervical back: Normal range of motion and neck supple. Skin:     General: Skin is warm and dry. Coloration: Skin is not pale. Findings: No erythema or rash. Neurological:      Mental Status: She is alert and oriented to person, place, and time. Cranial Nerves: No cranial nerve deficit. Motor: No abnormal muscle tone. Coordination: Coordination normal.      Deep Tendon Reflexes: Reflexes are normal and symmetric. Reflexes normal.   Psychiatric:         Behavior: Behavior normal.         Thought Content:  Thought content normal.         Judgment: Judgment normal.         Labs:     Lab Results   Component Value Date    WBC 3.47 (L) 08/11/2022    HGB 13.2 08/11/2022    HCT 39.0 08/11/2022    MCV 95 08/11/2022    RDW 11.9 08/11/2022     08/11/2022     BMP:  Lab Results   Component Value Date    SODIUM 141 08/11/2022    K 4.0 08/11/2022     (H) 08/11/2022    CO2 26 08/11/2022    BUN 11 08/11/2022    CREATININE 0.74 08/11/2022    GLUC 87 11/29/2018    GLUF 97 08/11/2022    CALCIUM 9.0 08/11/2022    EGFR 80 08/11/2022    MG 2.0 11/29/2018     LFT:  Lab Results   Component Value Date    AST 24 08/11/2022    ALT 28 08/11/2022    ALKPHOS 101 08/11/2022    TP 6.8 08/11/2022    ALB 3.5 08/11/2022      Lab Results   Component Value Date    PZN1RUJZWQBD 3.760 08/11/2022     No results found for: "HGBA1C"  Lipid Profile:   Lab Results   Component Value Date    CHOLESTEROL 210 (H) 08/11/2022    HDL 51 08/11/2022    LDLCALC 116 (H) 08/11/2022    TRIG 216 (H) 08/11/2022     Lab Results   Component Value Date    CHOLESTEROL 210 (H) 08/11/2022    CHOLESTEROL 213 (H) 09/03/2021     Lab Results   Component Value Date    TROPONINI <0.02 11/29/2018     Lab Results   Component Value Date    NTBNP 259 (H) 11/28/2018      No results found for this or any previous visit (from the past 672 hour(s)). Imaging & Testing   I have personally reviewed pertinent reports. Cardiac Testing       EKG: Personally reviewed. Normal sinus rhythm with no preexcitation no acute ST-T wave changes      Samia Harrington MD [address] [place] [address]  [phone]  Please call with any questions or suggestions    Counseling :  A description of the counseling:   Goals and Barriers:  Patient's ability to self care:  Medication side effect reviewed with patient in detail and all their questions answered. "Portions of the record may have been created with voice recognition software. Occasional wrong word or "sound a like" substitutions may have occurred due to the inherent limitations of voice recognition software. Read the chart carefully and recognize, using context, where substitutions have occurred.  Please call if you have any questions. "

## 2023-12-07 NOTE — ASSESSMENT & PLAN NOTE
Bradycardia noted during insertion of peripheral IV in the emergency department, most likely vasovagal response  Heart rate had dropped down into the 30s  Patient had brief loss of consciousness at that time  Heart rate return to normal sinus rhythm 60s to 80s  Approximately 1 hr after event patient was noted to be hypotensive with blood pressure of 71/48  Patient was given 1 L normal saline  Blood pressure increased 112/75  · Orthostatic blood pressures ordered with evidence of orthostatic hypotension  · Monitored on telemetry with no ectopy, heart rate recorded as low as 48 while sleeping, patient asymptomatic  · Cardiology consulted  Nuclear stress test done and result discussed to patient by Dr Kit Arenas  · Old records from patient's cardiologist in [place] reviewed by Dr Kit Arenas  · Patient will follow up with her Cardiology in [place] and she already has an appointment to see a cardio electrophysiologist   Patient advised to see them without fail  Progress Note - Cardiothoracic Surgery   Anabella Mahoney 77 y.o. male MRN: [id number]  Unit/Bed#: [facility] 422-01 Encounter: [id number]    Aortic stenosis, Non-Rheumatic. S/P transfemoral transcatheter aortic valve replacement; POD # 2    24 Hour Events: cardene weaned off yest afternoon, a-line removed last evening. BP controlled. No complaints this AM. Denies CP/SOB/Abd pain. Slept well.      Medications:   Scheduled Meds:  Current Facility-Administered Medications   Medication Dose Route Frequency Provider Last Rate    acetaminophen  650 mg Rectal Q4H PRN Fay Route, PA-C      acetaminophen  650 mg Oral Q4H PRN Fay Route, PA-C      amiodarone  200 mg Oral Daily Fay Route, PA-C      aspirin  81 mg Oral Daily Fay Route, PA-C      atorvastatin  20 mg Oral Daily With [employer], PA-C      bisacodyl  10 mg Rectal Daily PRN Fay Route, PA-C      buPROPion  300 mg Oral Daily Fay Route, PA-C      chlorhexidine  15 mL Mouth/Throat BID Fay Route, PA-C      clopidogrel  75 mg Oral Daily Fay Route, PA-C      escitalopram  20 mg Oral Daily Fay Route, PA-C      ezetimibe  10 mg Oral Daily Fay Route, PA-C      furosemide  40 mg Oral Daily Fay Route, PA-C      heparin (porcine)  5,000 Units Subcutaneous [facility] [address] Fay Route, PA-C      hydrALAZINE  10 mg Intravenous Q6H PRN Fay Route, PA-C      insulin lispro  1-6 Units Subcutaneous TID AC Fay Route, PA-C      insulin lispro  1-6 Units Subcutaneous HS Fay Route, PA-C      levothyroxine  75 mcg Oral Early Morning Fay Route, PA-C      lisinopril  5 mg Oral Daily Orien Sport, PA-C      metoprolol succinate  12.5 mg Oral Daily Fay Route, PA-C      mupirocin  1 Application Nasal E58P [address] Fay Route, PA-C      niCARdipine  1-15 mg/hr Intravenous Titrated Fay Route, PA-C Stopped (12/06/23 0902)    ondansetron  4 mg Intravenous Q6H PRN Fay Route, PA-C      pantoprazole  40 mg Oral BID AC Fay Route, PA-C      polyethylene glycol  17 g Oral Daily Pattricia Lev, PA-C      potassium chloride  10 mEq Oral Daily Pattricia Lev, PA-C       Continuous Infusions:niCARdipine, 1-15 mg/hr, Last Rate: Stopped (12/06/23 0902)      PRN Meds:.  acetaminophen    acetaminophen    bisacodyl    hydrALAZINE    ondansetron    Vitals:   Vitals:    12/06/23 2000 12/06/23 2223 12/07/23 0309 12/07/23 0559   BP:  117/68 134/69    BP Location:       Pulse:  57 56    Resp:       Temp:  97.9 °F (36.6 °C)     TempSrc:       SpO2: 98% 95% 97%    Weight:    101 kg (223 lb 12.3 oz)   Height:           Telemetry: Sinus Bradycardia; Heart Rate: 50s    Respiratory:   SpO2: SpO2: 97 %; Room Air    Intake/Output: -1468 ml/24 hrs    Intake/Output Summary (Last 24 hours) at 12/7/2023 0636  Last data filed at 12/7/2023 0601  Gross per 24 hour   Intake 1675 ml   Output 2950 ml   Net -1275 ml        Weights:   Weight (last 2 days)       Date/Time Weight    12/07/23 0559 101 (223.77)    12/06/23 0600 103 (226.6)    12/05/23 1501 98.9 (218)    12/05/23 0546 99.1 (218.5)          Admit weight: 99.1 kg    Results:   Results from last 7 days   Lab Units 12/06/23  0439 12/05/23  0856 12/05/23  0835   WBC Thousand/uL 12.36*  --   --    HEMOGLOBIN g/dL 10.3* 10.5*  --    I STAT HEMOGLOBIN g/dl  --   --  10.5*   HEMATOCRIT % 32.8* 33.9*  --    HEMATOCRIT, ISTAT %  --   --  31*   PLATELETS Thousands/uL 204 190  --      Results from last 7 days   Lab Units 12/06/23  0439 12/05/23  0856 12/05/23  0835 12/05/23  0747 12/01/23  0956   POTASSIUM mmol/L 4.1 3.9  --   --  4.2   CHLORIDE mmol/L 106 107  --   --  102   CO2 mmol/L 27 28  --   --  30   CO2, I-STAT mmol/L  --   --  26   < >  --    BUN mg/dL 21 21  --   --  25   CREATININE mg/dL 1.05 1.19  --   --  1.20   GLUCOSE, ISTAT mg/dl  --   --  104   < >  --    CALCIUM mg/dL 8.5 8.4  --   --  9.0    < > = values in this interval not displayed.      Results from last 7 days   Lab Units 12/01/23  0956   INR  0.91     Point of care glucose: BS [address]    Studies:    ECG 12/6: Sinus layo, rate 54. CXR 12/6: final report pending        Echocardiogram: Left Ventricle: Left ventricular cavity size is normal. Wall thickness is mildly increased. There is mild concentric hypertrophy. The left ventricular ejection fraction is 65% by visual estimation. Systolic function is vigorous. Although no diagnostic regional wall motion abnormality was identified, this possibility cannot be completely excluded on the basis of this study. Diastolic function is mildly abnormal, consistent with grade I (abnormal) relaxation. Right Ventricle: Right ventricular cavity size is mildly dilated. Systolic function is normal.    Aortic Valve: There is an Chen ADRIANNA 3 Ultra 26 mm TAVR bioprosthetic valve. The prosthetic valve appears well-seated and appears to be functioning normally. Prosthetic valve leaflet motion is normal. There is no evidence of paravalvular regurgitation. There is no evidence of transvalvular regurgitation. The gradient recorded across the prosthetic aortic valve is within the expected range. The aortic valve peak velocity is 2.14 m/s. The aortic valve peak gradient is 18 mmHg. The dimensionless velocity index is 0.60. The aortic valve area is 2.00 cm2. I have personally reviewed pertinent reports. and I have personally reviewed pertinent films in PACS    Invasive Lines/Tubes:  Invasive Devices       Central Venous Catheter Line  Duration             CVC Central Lines 12/05/23 Triple 1 day              Peripheral Intravenous Line  Duration             Peripheral IV 12/05/23 Left Antecubital 1 day    Peripheral IV 12/05/23 Right Wrist 1 day                    Physical Exam:    General: No acute distress, Alert, and Normal appearance  HEENT/NECK:  Normocephalic. Atraumatic. No jugular venous distention.     Cardiac: Regular rate and rhythm  Pulmonary:  Breath sounds clear bilaterally  Abdomen:  Non-tender, Non-distended, and Normal bowel sounds  Incisions: Groin puncture sites clean, dry, and intact without hematoma  Extremities: Extremities warm/dry  Neuro: Alert and oriented X 3 and No focal deficits  Skin: Warm/Dry, without rashes or lesions. Assessment:  Principal Problem:    Severe aortic stenosis  Active Problems:    Leukocytosis    Benign essential HTN    Other emphysema (HCC)    Borderline high cholesterol    Dyspnea on exertion    Mixed hyperlipidemia    Status post insertion of drug-eluting stent into left anterior descending (LAD) artery    s/p [employer] dual chamber ICD 11/2/2023    S/P TAVR (transcatheter aortic valve replacement)    Prediabetes    Hypothyroid    Stage 3a chronic kidney disease (HCC)       Aortic stenosis, Non-Rheumatic. S/P transfemoral transcatheter aortic valve replacement; POD # 2    Plan:    Cardiac:     Normal ventricular systolic function, EF 73%    Sinus Bradycardia; HR well-controlled  BP improved with addition of ACE, off cardene since yest afternoon    Continue Toprol XL 12.5mg PO Daily (home med)  Cont Lisinopril 5mg daily (new med this admit)    ASA/Plavix for recent DEON to LAD and now TAVR antithrombotic therapy  Postoperative transthoracic echocardiogram:  Echo completed; Well seated AV, without PVL    Continue Statin therapy    Central IV access no longer required; Remove central venous catheter today    Continue Subcutaneous Heparin for DVT prophylaxis    Pulmonary:     Good Room air oxygen saturation; Continue incentive spirometry/Coughing/Deep breathing exercises    Renal:     Post op Creatinine stable; Follow up labs prn   CKD 2-3a- baseline Cr 1.0-1.2  Intake/Output net: -1400mL/24 hours    Diuretic Regimen:  Continue PO Lasix, 40 mg QD (home med)  Continue Potassium Chloride 10 mEq PO QD    Neuro:    Neurologically intact;  No active issues     Incisional pain well-controlled; Continue prn Tylenol    GI:    Cardiac diet, with 1800 mL fluid restriction    Tolerating diet without complaint    Continue stool softeners and prn suppository    Continue GI prophylaxis    Endo:     Glucose well-controlled with insulin sliding scale coverage    7. Hematology:     Post-operative blood count acceptable; Trend prn  Leukocytosis; Afebrile with no signs of infection; Trend CBC prn    8. Disposition:    Gerontology consultation ordered for routine assessment of TAVR patient over 72years old    Routine postoperative recovery to this point;    Anticipated discharge date: today        VTE Pharmacologic Prophylaxis: Heparin  VTE Mechanical Prophylaxis: sequential compression device    Collaborative rounds completed with supervising physician  Plan of care discussed with bedside nurse    SIGNATURE: Gleen Burkitt, PA-C  DATE: December 7, 2023  TIME: 6:36 AM

## 2024-04-02 ENCOUNTER — TELEPHONE (OUTPATIENT)
Dept: FAMILY MEDICINE CLINIC | Facility: CLINIC | Age: 74
End: 2024-04-02

## 2024-04-02 NOTE — TELEPHONE ENCOUNTER
Left message on machine for patient to call office. Patient is due for Annual Wellness Visit.  Mary Canas

## 2024-10-03 ENCOUNTER — APPOINTMENT (OUTPATIENT)
Dept: LAB | Facility: CLINIC | Age: 74
End: 2024-10-03
Payer: COMMERCIAL

## 2024-10-03 ENCOUNTER — CONSULT (OUTPATIENT)
Dept: ENDOCRINOLOGY | Facility: CLINIC | Age: 74
End: 2024-10-03
Payer: COMMERCIAL

## 2024-10-03 VITALS
HEIGHT: 63 IN | WEIGHT: 151 LBS | BODY MASS INDEX: 26.75 KG/M2 | DIASTOLIC BLOOD PRESSURE: 88 MMHG | SYSTOLIC BLOOD PRESSURE: 130 MMHG

## 2024-10-03 DIAGNOSIS — E03.9 HYPOTHYROIDISM, UNSPECIFIED TYPE: Primary | ICD-10-CM

## 2024-10-03 LAB
T4 FREE SERPL-MCNC: 0.81 NG/DL (ref 0.61–1.12)
TSH SERPL DL<=0.05 MIU/L-ACNC: 1.46 UIU/ML (ref 0.45–4.5)

## 2024-10-03 PROCEDURE — 84439 ASSAY OF FREE THYROXINE: CPT | Performed by: INTERNAL MEDICINE

## 2024-10-03 PROCEDURE — 99204 OFFICE O/P NEW MOD 45 MIN: CPT | Performed by: INTERNAL MEDICINE

## 2024-10-03 PROCEDURE — 84443 ASSAY THYROID STIM HORMONE: CPT | Performed by: INTERNAL MEDICINE

## 2024-10-03 PROCEDURE — 36415 COLL VENOUS BLD VENIPUNCTURE: CPT | Performed by: INTERNAL MEDICINE

## 2024-10-03 RX ORDER — LEVOTHYROXINE SODIUM 88 UG/1
TABLET ORAL
Start: 2024-10-03

## 2024-10-03 RX ORDER — LEVOTHYROXINE SODIUM 75 UG/1
TABLET ORAL
Start: 2024-10-03

## 2024-10-03 NOTE — PROGRESS NOTES
10/3/2024    Assessment & Plan      Diagnoses and all orders for this visit:    Hypothyroidism, unspecified type  -     TSH, 3rd generation  -     T4, free        Assessment/Plan:  Hypothyroidism: We will start by updating lab work as ordered above and make adjustments as needed.  Suggested she adjust her levothyroxine dose to 75 mcg 5 days of the week and 88 mcg the other 2 days a week such as Wednesdays and Sundays.  We discussed that we could consider brand specific form of thyroid hormone replacement in the future but if labs are stable and she is overall tolerating this regimen, we could continue this regimen and repeat labs again prior to next appointment which we will arrange for when she is back from [place] in the spring.      CC: Hypothyroidism    History of Present Illness     HPI: Delia Chapin is a 74 y.o. year old female with history of hypothyroidism, SVT, vitamin D deficiency, hyperlipidemia who presents to establish care with endocrinology for history of hypothyroidism.  This was diagnosed years ago such as in her 20s/30s.  There is a strong family history of thyroid disorders.  More recently about 6 months ago she had her levothyroxine 75 mcg daily dose increased to 88 mcg daily.  Almost immediately she noted stomach upset.  Her dose was then adjusted to 75 mcg most days of the week but instead 88 mcg every third day.  She states her GI symptoms have resolved.  She presents today as she has never seen an endocrinologist and wished to discuss this further.  We reviewed how to take the medication correctly and had a separate multivitamin and other vitamins containing calcium and iron.    Review of Systems   Constitutional:  Negative for fatigue.   HENT:  Negative for trouble swallowing and voice change.    Eyes:  Negative for visual disturbance.   Respiratory:  Negative for shortness of breath.    Cardiovascular:  Negative for palpitations and leg swelling.   Gastrointestinal:  Negative for  abdominal pain, nausea and vomiting.   Endocrine: Negative for polydipsia and polyuria.   Musculoskeletal:  Negative for arthralgias and myalgias.   Skin:  Negative for rash.   Neurological:  Negative for dizziness, tremors and weakness.   Hematological:  Negative for adenopathy.   Psychiatric/Behavioral:  Negative for agitation and confusion.        Historical Information   Past Medical History:   Diagnosis Date    Anxiety     Arthritis     Disease of thyroid gland     Glaucoma     bilateral    Osteoporosis     SVT (supraventricular tachycardia) (HCC)     Vitamin D deficiency      Past Surgical History:   Procedure Laterality Date    BREAST LUMPECTOMY      CARDIAC ELECTROPHYSIOLOGY STUDY AND ABLATION       SECTION       Social History   Social History     Substance and Sexual Activity   Alcohol Use No     Social History     Substance and Sexual Activity   Drug Use No     Social History     Tobacco Use   Smoking Status Never   Smokeless Tobacco Never     Family History:   Family History   Problem Relation Age of Onset    Arthritis Mother     Arthritis Father     Cancer Sister        Meds/Allergies   Current Outpatient Medications   Medication Sig Dispense Refill    bimatoprost (LUMIGAN) 0.01 % ophthalmic drops Apply 1 drop to eye Daily      cholecalciferol (VITAMIN D3) 1,000 units tablet Take 5,000 Units by mouth daily Takes 5000 units twice daily.      levothyroxine 75 mcg tablet Take 1 tablet (75 mcg total) by mouth daily 30 tablet 0    Multiple Vitamin (multivitamin) tablet Take 1 tablet by mouth daily      ascorbic acid (VITAMIN C) 250 mg tablet Take 500 mg by mouth daily (Patient not taking: Reported on 10/3/2024)      bisoprolol (ZEBETA) 10 MG tablet Take 2.5 tablets (25 mg total) by mouth daily as needed (SVT) (Patient not taking: Reported on 10/28/2022) 30 tablet 0    Cholecalciferol (Vitamin D3) 50 MCG (2000 UT) TABS Take 2 tablets (4,000 Units total) by mouth daily (Patient taking differently: Take  "2,000 Units by mouth daily 5000 daily) 90 tablet 3    Jublia 10 % SOLN  (Patient not taking: Reported on 4/6/2023)      meclizine (ANTIVERT) 25 mg tablet Take 1 tablet (25 mg total) by mouth 3 (three) times a day as needed for dizziness (Patient not taking: Reported on 6/29/2022) 30 tablet 0     No current facility-administered medications for this visit.     No Known Allergies    Objective   Vitals: Blood pressure 130/88, height 5' 3\" (1.6 m), weight 68.5 kg (151 lb), last menstrual period 01/20/2000.  Invasive Devices       None                   Physical Exam  Vitals reviewed.   Constitutional:       General: She is not in acute distress.     Appearance: She is well-developed. She is not diaphoretic.   HENT:      Head: Normocephalic and atraumatic.   Eyes:      Conjunctiva/sclera: Conjunctivae normal.      Pupils: Pupils are equal, round, and reactive to light.   Neck:      Thyroid: No thyromegaly.   Cardiovascular:      Rate and Rhythm: Normal rate and regular rhythm.   Pulmonary:      Effort: Pulmonary effort is normal. No respiratory distress.      Breath sounds: Normal breath sounds.   Abdominal:      General: Bowel sounds are normal.      Palpations: Abdomen is soft.   Musculoskeletal:         General: Normal range of motion.      Cervical back: Normal range of motion and neck supple.   Skin:     General: Skin is warm and dry.      Findings: No rash.   Neurological:      Mental Status: She is alert and oriented to person, place, and time.      Motor: No abnormal muscle tone.   Psychiatric:         Behavior: Behavior normal.         The history was obtained from the review of the chart and from the patient.    Lab Results:      Labs from 1/9/2023:  Free T4 1.0, TSH 4.72      No future appointments.    Portions of the record may have been created with voice recognition software. Occasional wrong word or \"sound a like\" substitutions may have occurred due to the inherent limitations of voice recognition " software. Read the chart carefully and recognize, using context, where substitutions have occurred.

## 2024-10-07 ENCOUNTER — TELEPHONE (OUTPATIENT)
Age: 74
End: 2024-10-07

## 2024-10-07 DIAGNOSIS — E03.9 HYPOTHYROIDISM, UNSPECIFIED TYPE: Primary | ICD-10-CM

## 2024-10-07 NOTE — TELEPHONE ENCOUNTER
Patient returning call and made aware:      Henrry Soriano,   10/7/2024  9:33 AM EDT       Please call patient regarding these results: Please let patient know that thyroid levels look on target.  I would recommend we continue her current regimen as is and plan to repeat thyroid levels prior to her next appointment but she can contact us sooner with any questions or concerns.     Patient verbalized understanding.

## 2024-10-08 ENCOUNTER — TELEPHONE (OUTPATIENT)
Dept: ENDOCRINOLOGY | Facility: CLINIC | Age: 74
End: 2024-10-08

## 2024-10-08 NOTE — TELEPHONE ENCOUNTER
----- Message from Henrry Soriano DO sent at 10/7/2024  9:33 AM EDT -----  Please call patient regarding these results: Please let patient know that thyroid levels look on target.  I would recommend we continue her current regimen as is and plan to repeat thyroid levels prior to her next appointment but she can contact us sooner with any questions or concerns.

## 2024-10-18 ENCOUNTER — RA CDI HCC (OUTPATIENT)
Dept: OTHER | Facility: HOSPITAL | Age: 74
End: 2024-10-18

## 2024-10-18 PROBLEM — Z53.20 REFUSAL OF STATIN MEDICATION BY PATIENT: Status: RESOLVED | Noted: 2022-10-28 | Resolved: 2024-10-18

## 2024-10-23 ENCOUNTER — OFFICE VISIT (OUTPATIENT)
Dept: FAMILY MEDICINE CLINIC | Facility: CLINIC | Age: 74
End: 2024-10-23
Payer: COMMERCIAL

## 2024-10-23 VITALS
SYSTOLIC BLOOD PRESSURE: 104 MMHG | HEIGHT: 63 IN | DIASTOLIC BLOOD PRESSURE: 64 MMHG | RESPIRATION RATE: 16 BRPM | WEIGHT: 149.4 LBS | HEART RATE: 68 BPM | TEMPERATURE: 95.6 F | BODY MASS INDEX: 26.47 KG/M2

## 2024-10-23 DIAGNOSIS — M19.90 OSTEOARTHRITIS, UNSPECIFIED OSTEOARTHRITIS TYPE, UNSPECIFIED SITE: Primary | ICD-10-CM

## 2024-10-23 DIAGNOSIS — I47.10 SVT (SUPRAVENTRICULAR TACHYCARDIA) (HCC): ICD-10-CM

## 2024-10-23 PROBLEM — D70.8 OTHER NEUTROPENIA (HCC): Status: RESOLVED | Noted: 2020-08-13 | Resolved: 2024-10-23

## 2024-10-23 PROCEDURE — G2211 COMPLEX E/M VISIT ADD ON: HCPCS | Performed by: FAMILY MEDICINE

## 2024-10-23 PROCEDURE — 99214 OFFICE O/P EST MOD 30 MIN: CPT | Performed by: FAMILY MEDICINE

## 2024-10-23 NOTE — PROGRESS NOTES
"Ambulatory Visit  Name: Delia Chapin      : 1950      MRN: [id number]  Encounter Provider: Marbella Nowak MD  Encounter Date: 10/23/2024   Encounter department: [facility]    Assessment & Plan  Osteoarthritis, unspecified osteoarthritis type, unspecified site  She reports pain in her hands bilaterally due to the weather change. Discussed taking 2 extra strength tylenol twice a day and alternating with 2 ibuprofen or naproxen twice a day as needed. Can trial voltaren gel. Her pain has been improving. Usually improves more when she goes to [place] and is away  from the cold.        SVT (supraventricular tachycardia) (HCC)  Stable.           History of Present Illness     HPI  She has been having arthritis pain in her hands as well as fatigue since the weather changed. She usually goes to [place] when it gets colder out, but this year has to stay back in [place] until late November due to a relative's wedding. Due to this her arthritis has been flaring up. She has tried tylenol and naproxen over the counter which does help. Pain has been improving.   She has felt tired. Recently followed up with endocrinologist and her thyroid labs are stable. She hasn't been sleeping well at night and does not hydrate well.       Review of Systems   Constitutional:  Positive for fatigue.   HENT: Negative.     Eyes: Negative.    Respiratory: Negative.     Cardiovascular: Negative.    Gastrointestinal: Negative.    Endocrine: Negative.    Genitourinary: Negative.    Musculoskeletal:  Positive for arthralgias.   Skin: Negative.    Allergic/Immunologic: Negative.    Neurological: Negative.    Hematological: Negative.    Psychiatric/Behavioral: Negative.             Objective     /64   Pulse 68   Temp (!) 95.6 °F (35.3 °C)   Resp 16   Ht 5' 3\" (1.6 m)   Wt 67.8 kg (149 lb 6.4 oz)   LMP 2000 (Approximate)   BMI 26.47 kg/m²     Physical Exam  Constitutional:       General: She is not in acute " distress.     Appearance: She is well-developed. She is not diaphoretic.   HENT:      Head: Normocephalic and atraumatic.   Cardiovascular:      Rate and Rhythm: Normal rate and regular rhythm.      Heart sounds: Normal heart sounds. No murmur heard.     No friction rub. No gallop.   Pulmonary:      Effort: Pulmonary effort is normal. No respiratory distress.      Breath sounds: Normal breath sounds. No wheezing or rales.   Chest:      Chest wall: No tenderness.   Musculoskeletal:         General: No deformity. Normal range of motion.      Cervical back: Normal range of motion and neck supple.   Skin:     General: Skin is warm and dry.   Neurological:      Mental Status: She is alert and oriented to person, place, and time.   Psychiatric:         Behavior: Behavior normal.         Thought Content: Thought content normal.         Judgment: Judgment normal.

## 2024-10-24 NOTE — PRE-PROCEDURE INSTRUCTIONS
Pre-Surgery Instructions:   Medication Instructions    bimatoprost (LUMIGAN) 0.01 % ophthalmic drops Take day of surgery.    levothyroxine (Euthyrox) 88 mcg tablet Take day of surgery.    levothyroxine 75 mcg tablet Take day of surgery.      Medication instructions for day surgery reviewed. Please use only a sip of water to take your instructed medications. Avoid all over the counter vitamins, supplements and NSAIDS for one week prior to surgery per anesthesia guidelines. Tylenol is ok to take as needed.     You will receive a call one business day prior to surgery with an arrival time and hospital directions. If your surgery is scheduled on a Monday, the hospital will be calling you on the Friday prior to your surgery. If you have not heard from anyone by 8pm, please call the hospital supervisor through the hospital  at [phone]. ([place] [phone] or [place] [phone]).    Do not eat or drink anything after midnight the night before your surgery, including candy, mints, lifesavers, or chewing gum. Do not drink alcohol 24hrs before your surgery. Try not to smoke at least 24hrs before your surgery.       Follow the pre surgery showering instructions as listed in the “My Surgical Experience Booklet” or otherwise provided by your surgeon's office. Do not use a blade to shave the surgical area 1 week before surgery. It is okay to use a clean electric clippers up to 24 hours before surgery. Do not apply any lotions, creams, including makeup, cologne, deodorant, or perfumes after showering on the day of your surgery. Do not use dry shampoo, hair spray, hair gel, or any type of hair products.     No contact lenses, eye make-up, or artificial eyelashes. Remove nail polish, including gel polish, and any artificial, gel, or acrylic nails if possible. Remove all jewelry including rings and body piercing jewelry.     Wear causal clothing that is easy to take on and off. Consider your type of  surgery.    Keep any valuables, jewelry, piercings at home. Please bring any specially ordered equipment (sling, braces) if indicated.    Arrange for a responsible person to drive you to and from the hospital on the day of your surgery. Please confirm the visitor policy for the day of your procedure when you receive your phone call with an arrival time.     Call the surgeon's office with any new illnesses, exposures, or additional questions prior to surgery.    Please reference your “My Surgical Experience Booklet” for additional information to prepare for your upcoming surgery.

## 2024-10-27 ENCOUNTER — ANESTHESIA EVENT (OUTPATIENT)
Dept: PERIOP | Facility: AMBULARY SURGERY CENTER | Age: 74
End: 2024-10-27
Payer: COMMERCIAL

## 2024-10-27 PROBLEM — M19.90 ARTHRITIS: Status: ACTIVE | Noted: 2024-10-27

## 2024-10-27 PROBLEM — F41.9 ANXIETY: Status: ACTIVE | Noted: 2024-10-27

## 2024-10-27 NOTE — ANESTHESIA PREPROCEDURE EVALUATION
Procedure:  EXTRACTION EXTRACAPSULAR CATARACT PHACO INTRAOCULAR LENS (IOL) (Right: Eye)    Relevant Problems   CARDIO   (+) Breast pain, left   (+) Mixed hyperlipidemia   (+) SVT (supraventricular tachycardia) (HCC) (S/p ablation)      ENDO   (+) Hypothyroidism      MUSCULOSKELETAL   (+) Arthritis      NEURO/PSYCH   (+) Anxiety        Physical Exam    Airway    Mallampati score: II  TM Distance: >3 FB  Neck ROM: full     Dental       Cardiovascular  Rhythm: regular, Rate: normal    Pulmonary   Breath sounds clear to auscultation    Other Findings  post-pubertal.      Anesthesia Plan  ASA Score- 3     Anesthesia Type- IV sedation with anesthesia with ASA Monitors.         Additional Monitors:     Airway Plan:            Plan Factors-    Chart reviewed.        Patient is not a current smoker.              Induction- intravenous.    Postoperative Plan-     Perioperative Resuscitation Plan - Level 1 - Full Code.       Informed Consent- Anesthetic plan and risks discussed with patient.  I personally reviewed this patient with the CRNA. Discussed and agreed on the Anesthesia Plan with the CRNA..

## 2024-10-28 ENCOUNTER — ANESTHESIA (OUTPATIENT)
Dept: PERIOP | Facility: AMBULARY SURGERY CENTER | Age: 74
End: 2024-10-28
Payer: COMMERCIAL

## 2024-10-28 ENCOUNTER — HOSPITAL ENCOUNTER (OUTPATIENT)
Facility: AMBULARY SURGERY CENTER | Age: 74
Setting detail: OUTPATIENT SURGERY
Discharge: HOME | End: 2024-10-28
Attending: OPHTHALMOLOGY | Admitting: OPHTHALMOLOGY
Payer: COMMERCIAL

## 2024-10-28 VITALS
OXYGEN SATURATION: 98 % | RESPIRATION RATE: 18 BRPM | SYSTOLIC BLOOD PRESSURE: 137 MMHG | HEART RATE: 54 BPM | TEMPERATURE: 97.4 F | DIASTOLIC BLOOD PRESSURE: 62 MMHG

## 2024-10-28 DIAGNOSIS — H25.811 COMBINED FORMS OF AGE-RELATED CATARACT OF RIGHT EYE: Primary | ICD-10-CM

## 2024-10-28 PROCEDURE — V2632 POST CHMBR INTRAOCULAR LENS: HCPCS | Performed by: OPHTHALMOLOGY

## 2024-10-28 RX ORDER — PREDNISOLONE ACETATE 10 MG/ML
1 SUSPENSION/ DROPS OPHTHALMIC 4 TIMES DAILY
Start: 2024-10-28

## 2024-10-28 RX ORDER — PHENYLEPHRINE HYDROCHLORIDE 25 MG/ML
1 SOLUTION/ DROPS OPHTHALMIC
Status: COMPLETED | OUTPATIENT
Start: 2024-10-28 | End: 2024-10-28

## 2024-10-28 RX ORDER — LIDOCAINE HYDROCHLORIDE 10 MG/ML
INJECTION, SOLUTION EPIDURAL; INFILTRATION; INTRACAUDAL; PERINEURAL AS NEEDED
Status: DISCONTINUED | OUTPATIENT
Start: 2024-10-28 | End: 2024-10-28 | Stop reason: HOSPADM

## 2024-10-28 RX ORDER — MIDAZOLAM HYDROCHLORIDE 2 MG/2ML
INJECTION, SOLUTION INTRAMUSCULAR; INTRAVENOUS AS NEEDED
Status: DISCONTINUED | OUTPATIENT
Start: 2024-10-28 | End: 2024-10-28

## 2024-10-28 RX ORDER — GATIFLOXACIN 5 MG/ML
SOLUTION/ DROPS OPHTHALMIC AS NEEDED
Status: DISCONTINUED | OUTPATIENT
Start: 2024-10-28 | End: 2024-10-28 | Stop reason: HOSPADM

## 2024-10-28 RX ORDER — TETRACAINE HYDROCHLORIDE 5 MG/ML
1 SOLUTION OPHTHALMIC ONCE
Status: COMPLETED | OUTPATIENT
Start: 2024-10-28 | End: 2024-10-28

## 2024-10-28 RX ORDER — CYCLOPENTOLATE HYDROCHLORIDE 10 MG/ML
1 SOLUTION/ DROPS OPHTHALMIC
Status: COMPLETED | OUTPATIENT
Start: 2024-10-28 | End: 2024-10-28

## 2024-10-28 RX ORDER — GATIFLOXACIN 5 MG/ML
1 SOLUTION/ DROPS OPHTHALMIC 2 TIMES DAILY
Start: 2024-10-28

## 2024-10-28 RX ORDER — KETOROLAC TROMETHAMINE 5 MG/ML
1 SOLUTION OPHTHALMIC 4 TIMES DAILY
Start: 2024-10-28

## 2024-10-28 RX ORDER — TETRACAINE HYDROCHLORIDE 5 MG/ML
SOLUTION OPHTHALMIC AS NEEDED
Status: DISCONTINUED | OUTPATIENT
Start: 2024-10-28 | End: 2024-10-28 | Stop reason: HOSPADM

## 2024-10-28 RX ORDER — BALANCED SALT SOLUTION 6.4; .75; .48; .3; 3.9; 1.7 MG/ML; MG/ML; MG/ML; MG/ML; MG/ML; MG/ML
SOLUTION OPHTHALMIC AS NEEDED
Status: DISCONTINUED | OUTPATIENT
Start: 2024-10-28 | End: 2024-10-28 | Stop reason: HOSPADM

## 2024-10-28 RX ORDER — POVIDONE-IODINE 5 %
SOLUTION, NON-ORAL OPHTHALMIC (EYE) AS NEEDED
Status: DISCONTINUED | OUTPATIENT
Start: 2024-10-28 | End: 2024-10-28 | Stop reason: HOSPADM

## 2024-10-28 RX ORDER — LIDOCAINE HYDROCHLORIDE 20 MG/ML
1 JELLY TOPICAL
Status: COMPLETED | OUTPATIENT
Start: 2024-10-28 | End: 2024-10-28

## 2024-10-28 RX ORDER — KETOROLAC TROMETHAMINE 5 MG/ML
1 SOLUTION OPHTHALMIC
Status: COMPLETED | OUTPATIENT
Start: 2024-10-28 | End: 2024-10-28

## 2024-10-28 RX ADMIN — CYCLOPENTOLATE HYDROCHLORIDE 1 DROP: 10 SOLUTION/ DROPS OPHTHALMIC at 10:45

## 2024-10-28 RX ADMIN — LIDOCAINE HYDROCHLORIDE 1 APPLICATION: 20 JELLY TOPICAL at 10:30

## 2024-10-28 RX ADMIN — PHENYLEPHRINE HYDROCHLORIDE 1 DROP: 25 SOLUTION/ DROPS OPHTHALMIC at 10:45

## 2024-10-28 RX ADMIN — KETOROLAC TROMETHAMINE 1 DROP: 5 SOLUTION OPHTHALMIC at 10:45

## 2024-10-28 RX ADMIN — PHENYLEPHRINE HYDROCHLORIDE 1 DROP: 25 SOLUTION/ DROPS OPHTHALMIC at 10:30

## 2024-10-28 RX ADMIN — CYCLOPENTOLATE HYDROCHLORIDE 1 DROP: 10 SOLUTION/ DROPS OPHTHALMIC at 10:30

## 2024-10-28 RX ADMIN — LIDOCAINE HYDROCHLORIDE 1 APPLICATION: 20 JELLY TOPICAL at 10:45

## 2024-10-28 RX ADMIN — LIDOCAINE HYDROCHLORIDE 1 APPLICATION: 20 JELLY TOPICAL at 10:15

## 2024-10-28 RX ADMIN — CYCLOPENTOLATE HYDROCHLORIDE 1 DROP: 10 SOLUTION/ DROPS OPHTHALMIC at 11:00

## 2024-10-28 RX ADMIN — KETOROLAC TROMETHAMINE 1 DROP: 5 SOLUTION OPHTHALMIC at 10:15

## 2024-10-28 RX ADMIN — MIDAZOLAM 2 MG: 1 INJECTION INTRAMUSCULAR; INTRAVENOUS at 11:37

## 2024-10-28 RX ADMIN — KETOROLAC TROMETHAMINE 1 DROP: 5 SOLUTION OPHTHALMIC at 10:30

## 2024-10-28 RX ADMIN — PHENYLEPHRINE HYDROCHLORIDE 1 DROP: 25 SOLUTION/ DROPS OPHTHALMIC at 10:15

## 2024-10-28 RX ADMIN — PHENYLEPHRINE HYDROCHLORIDE 1 DROP: 25 SOLUTION/ DROPS OPHTHALMIC at 11:00

## 2024-10-28 RX ADMIN — TETRACAINE HYDROCHLORIDE 1 DROP: 5 SOLUTION OPHTHALMIC at 10:15

## 2024-10-28 RX ADMIN — CYCLOPENTOLATE HYDROCHLORIDE 1 DROP: 10 SOLUTION/ DROPS OPHTHALMIC at 10:15

## 2024-10-28 RX ADMIN — KETOROLAC TROMETHAMINE 1 DROP: 5 SOLUTION OPHTHALMIC at 11:00

## 2024-10-28 NOTE — DISCHARGE INSTR - AVS FIRST PAGE
Dr. Chandler Cataract Instructions    Activity:     1.  No Driving until instructed   2.  Keep shield on until seen tomorrow except when administering drops   3.  No heavy lifting   4.  No water in eye     Diet:     1.  Resume normal diet    Normal Symptoms:     1.  Mild Headache   2. Scratchy or picky feeling around eye    Call the office if:     1.  You have any questions or concerns   2.  If eye pain is not relieved by extra strength tylenol    Office phone number:  [phone]      Next appointment:     1.  See Dr Chandler at his office tomorrow as scheduled   __________________________________________________________   2.  Bring blue eye kit with you and eyedrops to the office    A new set of comprehensive instructions will be given and reviewed with you during your office visit tomorrow.

## 2024-10-28 NOTE — OP NOTE
OPERATIVE REPORT    PATIENT NAME: Delia Chapin    :  1950  MRN: [id number]  Pt Location: [facility] OR ROOM 01    Surgery Date: 10/28/2024    Surgeons and Role:     * Oziel Chandler MD - Primary    Age-related nuclear cataract, right eye [H25.11]    Post-Op Diagnosis Codes:     * Age-related nuclear cataract, right eye [H25.11]    Procedure(s):  EXTRACTION EXTRACAPSULAR CATARACT PHACO INTRAOCULAR LENS (IOL)    Anesthesia Type:   IV Sedation with Anesthesia    Operative Indications:  Age-related nuclear cataract, right eye [H25.11]  Decreased vision to 20/40. With problems driving.  Pt requested cataract sx the right eye    Procedure and Technique:    Procedure Details     The patient was brought in the OR in stable condition and placed on the operative table. The right eye was prepped and draped in the usual sterile manner. Attention was directed to the right eye where a lid speculum was placed. A 2.4 mm clear corneal incision was made temperally. 1/2 cc of 1% MPF Lidocaine was irrigated into the anterior chamber followed by viscoat. The side port incision was placed superiorly. The capsularrhexis was made and the nucleus was hydrodissected with BSS. The nucleus was then removed with the phaco handpiece followed by removal of the cortical material with the I/A handpiece. The capsular bag was then filled with Provisc. The IOL was folded and placed in to the capsular bag and centered well. The remaining Provisc was removed from the eye with the I/A. The wounds were hydrated with BSS and found to be water tight. The lid speculum was removed and 2 drops of Gatifloxicin were placed over the cornea. A protective eye shield was taped over the eye and the patient went to PACU in stable condition. I will see the patient in the office tomorrow and the expected post op period is a few weeks.       Complications: None        Disposition: PACU   Condition: Stable    SIGNATURE: Oziel Chandler MD  DATE:   2024  TIME: 11:54 AM

## 2024-10-28 NOTE — ANESTHESIA POSTPROCEDURE EVALUATION
Post-Op Assessment Note    CV Status:  Stable  Pain Score: 0    Pain management: adequate       Mental Status:  Awake   Hydration Status:  Stable   PONV Controlled:  None   Airway Patency:  Patent     Post Op Vitals Reviewed: Yes    No anethesia notable event occurred.    Staff: Anesthesiologist           Last Filed PACU Vitals:  Vitals Value Taken Time   Temp     Pulse 54 10/28/24 1154   /62 10/28/24 1154   Resp 18 10/28/24 1154   SpO2 98 % 10/28/24 1154       Modified Job:  Activity: 2 (10/28/2024 11:54 AM)  Respiration: 2 (10/28/2024 11:54 AM)  Circulation: 2 (10/28/2024 11:54 AM)  Consciousness: 2 (10/28/2024 11:54 AM)  Oxygen Saturation: 2 (10/28/2024 11:54 AM)  Modified Job Score: 10 (10/28/2024 11:54 AM)

## 2025-06-06 ENCOUNTER — OFFICE VISIT (OUTPATIENT)
Dept: ENDOCRINOLOGY | Facility: CLINIC | Age: 75
End: 2025-06-06
Payer: COMMERCIAL

## 2025-06-06 VITALS
HEIGHT: 63 IN | BODY MASS INDEX: 26.01 KG/M2 | SYSTOLIC BLOOD PRESSURE: 128 MMHG | DIASTOLIC BLOOD PRESSURE: 70 MMHG | HEART RATE: 67 BPM | WEIGHT: 146.8 LBS

## 2025-06-06 DIAGNOSIS — E03.9 ACQUIRED HYPOTHYROIDISM: Primary | ICD-10-CM

## 2025-06-06 PROCEDURE — 99214 OFFICE O/P EST MOD 30 MIN: CPT | Performed by: INTERNAL MEDICINE

## 2025-06-06 PROCEDURE — G2211 COMPLEX E/M VISIT ADD ON: HCPCS | Performed by: INTERNAL MEDICINE

## 2025-06-06 NOTE — ASSESSMENT & PLAN NOTE
Clinically and biochemically overall doing well on current regimen.  We will continue current regimen arrange for follow-up in 6 months with labs just prior.  Orders:    TSH, 3rd generation    T4, free

## 2025-06-06 NOTE — PROGRESS NOTES
"Name: Delia Chapin      : 1950      MRN: [id number]  Encounter Provider: Henrry Soriano DO  Encounter Date: 2025   Encounter department: [facility] [place]    No chief complaint on file.  :  Assessment & Plan  Acquired hypothyroidism  Clinically and biochemically overall doing well on current regimen.  We will continue current regimen arrange for follow-up in 6 months with labs just prior.  Orders:    TSH, 3rd generation    T4, free      Assessment & Plan        Pertinent Medical History   Delia has a history of hypothyroidism, SVT, vitamin D deficiency, hyperlipidemia who established care with our office for history of hypothyroidism back in 2024.  Hypothyroidism was diagnosed in her 20s or 30s.  There is a strong family history of thyroid disorders and she has been maintained on levothyroxine.        History of Present Illness   History of Present Illness    Delia Chapin is a 74 y.o. female who presents for a follow-up of hypothyroidism maintained on levothyroxine 75 mcg tablets 5 days a week and 88 mcg tablets the other 2 days of the week.  She presents today overall feeling well.  No new health issues or symptoms of concern.    Review of Systems   All other systems reviewed and are negative.   as per HPI       Medical History Reviewed by provider this encounter:     .    Objective   /70 (BP Location: Left arm, Patient Position: Sitting, Cuff Size: Adult)   Pulse 67   Ht 5' 3\" (1.6 m)   Wt 66.6 kg (146 lb 12.8 oz)   LMP 2000 (Approximate)   BMI 26.00 kg/m²      Body mass index is 26 kg/m².  Wt Readings from Last 3 Encounters:   25 66.6 kg (146 lb 12.8 oz)   10/23/24 67.8 kg (149 lb 6.4 oz)   10/03/24 68.5 kg (151 lb)     Physical Exam  Vitals reviewed.   Constitutional:       General: She is not in acute distress.     Appearance: She is well-developed. She is not diaphoretic.   HENT:      Head: Normocephalic and " atraumatic.     Eyes:      Conjunctiva/sclera: Conjunctivae normal.      Pupils: Pupils are equal, round, and reactive to light.     Neck:      Thyroid: No thyromegaly.     Cardiovascular:      Rate and Rhythm: Normal rate and regular rhythm.   Pulmonary:      Effort: Pulmonary effort is normal. No respiratory distress.      Breath sounds: Normal breath sounds.   Abdominal:      General: Bowel sounds are normal.      Palpations: Abdomen is soft.     Musculoskeletal:         General: Normal range of motion.      Cervical back: Normal range of motion and neck supple.     Skin:     General: Skin is warm and dry.      Findings: No rash.     Neurological:      Mental Status: She is alert and oriented to person, place, and time.      Motor: No abnormal muscle tone.     Psychiatric:         Behavior: Behavior normal.       Physical Exam      Results    Labs: I have reviewed pertinent labs including:       Component      Latest Ref Rng 10/3/2024   TSH 3RD GENERATON      0.450 - 4.500 uIU/mL 1.462    FREE T4      0.61 - 1.12 ng/dL 0.81      12/27/24:  TSH 0.94    There are no Patient Instructions on file for this visit.    Discussed with the patient and all questioned fully answered. She will call me if any problems arise.

## 2025-06-16 NOTE — PRE-PROCEDURE INSTRUCTIONS
Pre-Surgery Instructions:   Medication Instructions    bimatoprost (LUMIGAN) 0.01 % ophthalmic drops Instructions provided by MD    cholecalciferol (VITAMIN D3) 1,000 units tablet Hold day of surgery.    levothyroxine (Euthyrox) 88 mcg tablet Hold day of surgery.    levothyroxine 75 mcg tablet Take day of surgery.    meclizine (ANTIVERT) 25 mg tablet Hold day of surgery.    Multiple Vitamin (multivitamin) tablet Hold day of surgery.    Medication instructions for day of surgery reviewed. Please take all instructed medications with only a sip of water. Please do not take any over the counter (non-prescribed) vitamins or supplements for one week prior to date of surgery.      You will receive a call one business day prior to surgery with an arrival time and hospital directions. If your surgery is scheduled on a Monday, the hospital will be calling you on the Friday prior to your surgery. If you have not heard from anyone by 8pm, please call the hospital supervisor through the hospital  at [phone]. ([place] [phone] or [place] [phone]).    Do not eat or drink anything after midnight the night before your surgery, including candy, mints, lifesavers, or chewing gum. Do not drink alcohol 24hrs before your surgery. Try not to smoke at least 24hrs before your surgery.       Follow the pre surgery showering instructions as listed in the “My Surgical Experience Booklet” or otherwise provided by your surgeon's office. Do not use a blade to shave the surgical area 1 week before surgery. It is okay to use a clean electric clippers up to 24 hours before surgery. Do not apply any lotions, creams, including makeup, cologne, deodorant, or perfumes after showering on the day of your surgery. Do not use dry shampoo, hair spray, hair gel, or any type of hair products.     No contact lenses, eye make-up, or artificial eyelashes. Remove nail polish, including gel polish, and any artificial, gel, or acrylic nails  if possible. Remove all jewelry including rings and body piercing jewelry.     Wear causal clothing that is easy to take on and off. Consider your type of surgery.    Keep any valuables, jewelry, piercings at home. Please bring any specially ordered equipment (sling, braces) if indicated.    Arrange for a responsible person to drive you to and from the hospital on the day of your surgery. Please confirm the visitor policy for the day of your procedure when you receive your phone call with an arrival time.     Call the surgeon's office with any new illnesses, exposures, or additional questions prior to surgery.    Please reference your “My Surgical Experience Booklet” for additional information to prepare for your upcoming surgery.

## 2025-06-23 ENCOUNTER — ANESTHESIA EVENT (OUTPATIENT)
Dept: PERIOP | Facility: AMBULARY SURGERY CENTER | Age: 75
End: 2025-06-23
Payer: COMMERCIAL

## 2025-06-23 ENCOUNTER — HOSPITAL ENCOUNTER (OUTPATIENT)
Facility: AMBULARY SURGERY CENTER | Age: 75
Setting detail: OUTPATIENT SURGERY
Discharge: HOME | End: 2025-06-23
Attending: OPHTHALMOLOGY | Admitting: OPHTHALMOLOGY
Payer: COMMERCIAL

## 2025-06-23 ENCOUNTER — ANESTHESIA (OUTPATIENT)
Dept: PERIOP | Facility: AMBULARY SURGERY CENTER | Age: 75
End: 2025-06-23
Payer: COMMERCIAL

## 2025-06-23 VITALS
RESPIRATION RATE: 16 BRPM | TEMPERATURE: 97.3 F | HEIGHT: 63 IN | OXYGEN SATURATION: 99 % | DIASTOLIC BLOOD PRESSURE: 65 MMHG | SYSTOLIC BLOOD PRESSURE: 136 MMHG | BODY MASS INDEX: 25.52 KG/M2 | WEIGHT: 144 LBS | HEART RATE: 53 BPM

## 2025-06-23 DIAGNOSIS — H25.12 AGE-RELATED NUCLEAR CATARACT OF LEFT EYE: Primary | ICD-10-CM

## 2025-06-23 PROCEDURE — V2632 POST CHMBR INTRAOCULAR LENS: HCPCS | Performed by: OPHTHALMOLOGY

## 2025-06-23 DEVICE — STERILE UV AND BLUE LIGHT FILTERING ACRYLIC FOLDABLE ASPHERIC POSTERIOR CHAMBER INTRAOCULAR LENS
Type: IMPLANTABLE DEVICE | Site: EYE | Status: FUNCTIONAL
Brand: CLAREON

## 2025-06-23 RX ORDER — TETRACAINE HYDROCHLORIDE 5 MG/ML
1 SOLUTION OPHTHALMIC ONCE
Status: COMPLETED | OUTPATIENT
Start: 2025-06-23 | End: 2025-06-23

## 2025-06-23 RX ORDER — TETRACAINE HYDROCHLORIDE 5 MG/ML
SOLUTION OPHTHALMIC AS NEEDED
Status: DISCONTINUED | OUTPATIENT
Start: 2025-06-23 | End: 2025-06-23 | Stop reason: HOSPADM

## 2025-06-23 RX ORDER — KETOROLAC TROMETHAMINE 5 MG/ML
1 SOLUTION OPHTHALMIC
Status: COMPLETED | OUTPATIENT
Start: 2025-06-23 | End: 2025-06-23

## 2025-06-23 RX ORDER — PHENYLEPHRINE HYDROCHLORIDE 25 MG/ML
1 SOLUTION/ DROPS OPHTHALMIC
Status: COMPLETED | OUTPATIENT
Start: 2025-06-23 | End: 2025-06-23

## 2025-06-23 RX ORDER — KETOROLAC TROMETHAMINE 5 MG/ML
1 SOLUTION OPHTHALMIC 4 TIMES DAILY
Start: 2025-06-23

## 2025-06-23 RX ORDER — LIDOCAINE HYDROCHLORIDE 20 MG/ML
1 JELLY TOPICAL
Status: COMPLETED | OUTPATIENT
Start: 2025-06-23 | End: 2025-06-23

## 2025-06-23 RX ORDER — BALANCED SALT SOLUTION 6.4; .75; .48; .3; 3.9; 1.7 MG/ML; MG/ML; MG/ML; MG/ML; MG/ML; MG/ML
SOLUTION OPHTHALMIC AS NEEDED
Status: DISCONTINUED | OUTPATIENT
Start: 2025-06-23 | End: 2025-06-23 | Stop reason: HOSPADM

## 2025-06-23 RX ORDER — PREDNISOLONE ACETATE 10 MG/ML
1 SUSPENSION/ DROPS OPHTHALMIC 4 TIMES DAILY
Start: 2025-06-23

## 2025-06-23 RX ORDER — GATIFLOXACIN 5 MG/ML
SOLUTION/ DROPS OPHTHALMIC AS NEEDED
Status: DISCONTINUED | OUTPATIENT
Start: 2025-06-23 | End: 2025-06-23 | Stop reason: HOSPADM

## 2025-06-23 RX ORDER — POVIDONE-IODINE 5 %
SOLUTION, NON-ORAL OPHTHALMIC (EYE) AS NEEDED
Status: DISCONTINUED | OUTPATIENT
Start: 2025-06-23 | End: 2025-06-23 | Stop reason: HOSPADM

## 2025-06-23 RX ORDER — CYCLOPENTOLATE HYDROCHLORIDE 10 MG/ML
1 SOLUTION/ DROPS OPHTHALMIC
Status: COMPLETED | OUTPATIENT
Start: 2025-06-23 | End: 2025-06-23

## 2025-06-23 RX ORDER — MIDAZOLAM HYDROCHLORIDE 2 MG/2ML
INJECTION, SOLUTION INTRAMUSCULAR; INTRAVENOUS AS NEEDED
Status: DISCONTINUED | OUTPATIENT
Start: 2025-06-23 | End: 2025-06-23

## 2025-06-23 RX ORDER — SODIUM CHLORIDE, SODIUM LACTATE, POTASSIUM CHLORIDE, CALCIUM CHLORIDE 600; 310; 30; 20 MG/100ML; MG/100ML; MG/100ML; MG/100ML
20 INJECTION, SOLUTION INTRAVENOUS CONTINUOUS
Status: DISCONTINUED | OUTPATIENT
Start: 2025-06-23 | End: 2025-06-23 | Stop reason: HOSPADM

## 2025-06-23 RX ORDER — GATIFLOXACIN 5 MG/ML
1 SOLUTION/ DROPS OPHTHALMIC 2 TIMES DAILY
Start: 2025-06-23

## 2025-06-23 RX ORDER — LIDOCAINE HYDROCHLORIDE 10 MG/ML
INJECTION, SOLUTION EPIDURAL; INFILTRATION; INTRACAUDAL; PERINEURAL AS NEEDED
Status: DISCONTINUED | OUTPATIENT
Start: 2025-06-23 | End: 2025-06-23 | Stop reason: HOSPADM

## 2025-06-23 RX ADMIN — PHENYLEPHRINE HYDROCHLORIDE 1 DROP: 25 SOLUTION/ DROPS OPHTHALMIC at 11:00

## 2025-06-23 RX ADMIN — KETOROLAC TROMETHAMINE 1 DROP: 5 SOLUTION OPHTHALMIC at 11:15

## 2025-06-23 RX ADMIN — CYCLOPENTOLATE HYDROCHLORIDE 1 DROP: 10 SOLUTION OPHTHALMIC at 11:49

## 2025-06-23 RX ADMIN — TETRACAINE HYDROCHLORIDE 1 DROP: 5 SOLUTION OPHTHALMIC at 11:00

## 2025-06-23 RX ADMIN — PHENYLEPHRINE HYDROCHLORIDE 1 DROP: 25 SOLUTION/ DROPS OPHTHALMIC at 11:49

## 2025-06-23 RX ADMIN — CYCLOPENTOLATE HYDROCHLORIDE 1 DROP: 10 SOLUTION OPHTHALMIC at 11:30

## 2025-06-23 RX ADMIN — CYCLOPENTOLATE HYDROCHLORIDE 1 DROP: 10 SOLUTION OPHTHALMIC at 11:15

## 2025-06-23 RX ADMIN — LIDOCAINE HYDROCHLORIDE 1 APPLICATION: 20 JELLY TOPICAL at 11:30

## 2025-06-23 RX ADMIN — MIDAZOLAM 2 MG: 1 INJECTION INTRAMUSCULAR; INTRAVENOUS at 12:10

## 2025-06-23 RX ADMIN — PHENYLEPHRINE HYDROCHLORIDE 1 DROP: 25 SOLUTION/ DROPS OPHTHALMIC at 11:15

## 2025-06-23 RX ADMIN — CYCLOPENTOLATE HYDROCHLORIDE 1 DROP: 10 SOLUTION OPHTHALMIC at 11:00

## 2025-06-23 RX ADMIN — KETOROLAC TROMETHAMINE 1 DROP: 5 SOLUTION OPHTHALMIC at 11:30

## 2025-06-23 RX ADMIN — KETOROLAC TROMETHAMINE 1 DROP: 5 SOLUTION OPHTHALMIC at 11:00

## 2025-06-23 RX ADMIN — LIDOCAINE HYDROCHLORIDE 1 APPLICATION: 20 JELLY TOPICAL at 11:15

## 2025-06-23 RX ADMIN — LIDOCAINE HYDROCHLORIDE 1 APPLICATION: 20 JELLY TOPICAL at 11:00

## 2025-06-23 RX ADMIN — PHENYLEPHRINE HYDROCHLORIDE 1 DROP: 25 SOLUTION/ DROPS OPHTHALMIC at 11:30

## 2025-06-23 RX ADMIN — KETOROLAC TROMETHAMINE 1 DROP: 5 SOLUTION OPHTHALMIC at 11:49

## 2025-06-23 NOTE — ANESTHESIA PREPROCEDURE EVALUATION
Procedure:  EXTRACTION EXTRACAPSULAR CATARACT PHACO INTRAOCULAR LENS (IOL) (Left: Eye)    Relevant Problems   CARDIO   (+) Breast pain, left   (+) Mixed hyperlipidemia   (+) SVT (supraventricular tachycardia) (HCC)      ENDO   (+) Hypothyroidism      MUSCULOSKELETAL   (+) Arthritis      NEURO/PSYCH   (+) Anxiety        Physical Exam    Airway     Mallampati score: II  TM Distance: >3 FB  Neck ROM: full  Upper bite lip test: I  Mouth opening: >= 4 cm      Cardiovascular  Cardiovascular exam normal    Dental   No notable dental hx     Pulmonary  Pulmonary exam normal     Neurological    She appears awake, alert and oriented x3.      Other Findings  post-pubertal.      Anesthesia Plan  ASA Score- 2     Anesthesia Type- IV sedation with anesthesia with ASA Monitors.         Additional Monitors:     Airway Plan:            Plan Factors-Exercise tolerance (METS): >4 METS.    Chart reviewed.    Patient summary reviewed.    Patient is not a current smoker.              Induction-     Postoperative Plan- .   Monitoring Plan - Monitoring plan - standard ASA monitoring      Perioperative Resuscitation Plan - Level 1 - Full Code.       Informed Consent- Anesthetic plan and risks discussed with patient.  I personally reviewed this patient with the CRNA. Discussed and agreed on the Anesthesia Plan with the CRNA..      NPO Status:  Vitals Value Taken Time   Date of last liquid 06/23/25 06/23/25 11:11   Time of last liquid 0700 06/23/25 11:11   Date of last solid 06/22/25 06/23/25 11:11   Time of last solid 1700 06/23/25 11:11

## 2025-06-23 NOTE — DISCHARGE INSTR - AVS FIRST PAGE
Dr. Chandler Cataract Instructions    Activity:     1.  No Driving until instructed   2.  Keep shield on until seen tomorrow except when administering drops   3.  No heavy lifting   4.  No water in eye     Diet:     1.  Resume normal diet    Normal Symptoms:     1.  Mild Headache   2. Scratchy or picky feeling around eye    Call the office if:     1.  You have any questions or concerns   2.  If eye pain is not relieved by extra strength tylenol    Office phone number:  [phone]      Next appointment:     1.  See Dr Chandler at his office tomorrow as scheduled   __________________________________________________________   2.  Bring blue eye kit with you and eyedrops to the office    A new set of comprehensive instructions will be given and reviewed with you during your office visit tomorrow.

## 2025-06-23 NOTE — OP NOTE
OPERATIVE REPORT    PATIENT NAME: Delia Chapin    :  1950  MRN: [id number]  Pt Location: [facility] OR ROOM 01    Surgery Date: 2025    Surgeons and Role:     * Oziel Chandler MD - Primary    Age-related nuclear cataract, left eye [H25.12]    Post-Op Diagnosis Codes:     * Age-related nuclear cataract, left eye [H25.12]    Procedure(s):  EXTRACTION EXTRACAPSULAR CATARACT PHACO INTRAOCULAR LENS (IOL)    Anesthesia Type:   IV Sedation with Anesthesia    Operative Indications:  Age-related nuclear cataract, left eye [H25.12]  Decreased vision to 20/60. With problems driving.  Pt requested cataract sx the left eye    Procedure and Technique:    Procedure Details     The patient was brought in the OR in stable condition and placed on the operative table. The left eye was prepped and draped in the usual sterile manner. Attention was directed to the left eye where a lid speculum was placed. A 2.4 mm clear corneal incision was made temperally. 1/2 cc of 1% MPF Lidocaine was irrigated into the anterior chamber followed by viscoat. The side port incision was placed superiorly. The capsularrhexis was made and the nucleus was hydrodissected with BSS. The nucleus was then removed with the phaco handpiece followed by removal of the cortical material with the I/A handpiece. The capsular bag was then filled with Provisc. The IOL was folded and placed in to the capsular bag and centered well. The remaining Provisc was removed from the eye with the I/A. The wounds were hydrated with BSS and found to be water tight. The lid speculum was removed and 2 drops of Gatifloxicin were placed over the cornea. A protective eye shield was taped over the eye and the patient went to PACU in stable condition. I will see the patient in the office tomorrow and the expected post op period is a few weeks.       Complications: None        Disposition: PACU   Condition: Stable    SIGNATURE: Oziel Chandler MD  DATE: 2025  TIME:  12:36 PM

## 2025-06-26 NOTE — ANESTHESIA POSTPROCEDURE EVALUATION
Post-Op Assessment Note    CV Status:  Stable    Pain management: adequate       Mental Status:  Alert and awake   Hydration Status:  Euvolemic   PONV Controlled:  Controlled   Airway Patency:  Patent     Post Op Vitals Reviewed: Yes    No anethesia notable event occurred.    Staff: Anesthesiologist           Last Filed PACU Vitals:  Vitals Value Taken Time   Temp     Pulse 53 06/23/25 12:36   /65 06/23/25 12:36   Resp 16 06/23/25 12:36   SpO2 99 % 06/23/25 12:36       Modified Job:     Vitals Value Taken Time   Activity 2 06/23/25 12:36   Respiration 2 06/23/25 12:36   Circulation 2 06/23/25 12:36   Consciousness 2 06/23/25 12:36   Oxygen Saturation 2 06/23/25 12:36     Modified Job Score: 10

## 2025-07-23 ENCOUNTER — OFFICE VISIT (OUTPATIENT)
Age: 75
End: 2025-07-23
Payer: COMMERCIAL

## 2025-07-23 VITALS — HEIGHT: 63 IN | BODY MASS INDEX: 25.52 KG/M2 | RESPIRATION RATE: 17 BRPM | WEIGHT: 144 LBS

## 2025-07-23 DIAGNOSIS — M79.671 RIGHT FOOT PAIN: Primary | ICD-10-CM

## 2025-07-23 DIAGNOSIS — L03.031 PARONYCHIA OF TOENAIL OF RIGHT FOOT: ICD-10-CM

## 2025-07-23 DIAGNOSIS — B35.1 ONYCHOMYCOSIS: ICD-10-CM

## 2025-07-23 PROCEDURE — 99202 OFFICE O/P NEW SF 15 MIN: CPT | Performed by: PODIATRIST

## 2025-07-23 RX ORDER — BRIMONIDINE TARTRATE 1 MG/ML
SOLUTION/ DROPS OPHTHALMIC
COMMUNITY
Start: 2025-05-02

## 2025-07-23 RX ORDER — GRISEOFULVIN 125 MG/1
250 TABLET ORAL DAILY
Qty: 30 TABLET | Refills: 0 | Status: SHIPPED | OUTPATIENT
Start: 2025-07-23 | End: 2025-08-22

## 2025-07-23 RX ORDER — HYDROCORTISONE 25 MG/G
CREAM TOPICAL
COMMUNITY
Start: 2025-05-08

## 2025-07-23 RX ORDER — RAMELTEON 8 MG/1
TABLET ORAL
COMMUNITY
Start: 2025-05-22

## 2025-07-23 NOTE — PROGRESS NOTES
Assessment/Plan: Pain upon ambulation secondary to paronychia.  Mycosis of nails right foot.    Plan.  Chart reviewed.  PCP notes reviewed.  Patient evaluated.  At this time we will place the patient on an empiric course of griseofulvin.  She gets nausea from terbinafine.  Patient advised on foot hygiene and nail cutting.  She will spray shoes with Lysol.  Aftercare instruction given.  Return for follow-up.         Diagnoses and all orders for this visit:    Right foot pain    Paronychia of toenail of right foot    Onychomycosis  -     griseofulvin (DONG-PEG) 250 MG tablet; Take 1 tablet (250 mg total) by mouth daily    Other orders  -     hydrocortisone 2.5 % cream  -     ramelteon (ROZEREM) 8 mg tablet  -     brimonidine (ALPHAGAN P) 0.1 %          Subjective: Patient presents for second opinion.  Patient is concerned about fungus of her nails.  She tried topical agents without success    No Known Allergies    Current Medications[1]    Problem List[2]       Patient ID: Delia Chapin is a 74 y.o. female.    HPI    The following portions of the patient's history were reviewed and updated as appropriate:     family history includes Arthritis in her father and mother; Cancer in her sister.      reports that she has never smoked. She has been exposed to tobacco smoke. She has never used smokeless tobacco. She reports that she does not drink alcohol and does not use drugs.    Vitals:    07/23/25 1001   Resp: 17       Review of Systems      Objective:  Patient's shoes and socks removed.   Foot ExamPhysical Exam      Constitutional:       Appearance: Normal appearance.   Cardiovascular:      Rate and Rhythm: Normal rate and regular rhythm.   Musculoskeletal:      Comments: Patient demonstrates pes cavus type foot.    Skin:     Capillary Refill: Capillary refill takes less than 2 seconds.  All nails are dystrophic.  Nails 1 2 and 3 of the right foot demonstrate distal mycosis.  Mild paronychia second right toe.   Negative pus.  Neurological:      Mental Status: She is alert.   Psychiatric:         Mood and Affect: Mood normal.         Behavior: Behavior normal.         Thought Content: Thought content normal.         Judgment: Judgment normal.                              [1]   Current Outpatient Medications:     brimonidine (ALPHAGAN P) 0.1 %, , Disp: , Rfl:     hydrocortisone 2.5 % cream, , Disp: , Rfl:     ramelteon (ROZEREM) 8 mg tablet, , Disp: , Rfl:     bimatoprost (LUMIGAN) 0.01 % ophthalmic drops, Apply 1 drop to eye in the morning., Disp: , Rfl:     cholecalciferol (VITAMIN D3) 1,000 units tablet, Take 5,000 Units by mouth in the morning. Takes 5000 units twice daily., Disp: , Rfl:     gatifloxacin (ZYMAXID) 0.5 %, Administer 1 drop into the left eye 2 (two) times a day, Disp: , Rfl:     griseofulvin (DONG-PEG) 250 MG tablet, Take 1 tablet (250 mg total) by mouth daily, Disp: 30 tablet, Rfl: 0    ketorolac (ACULAR) 0.5 % ophthalmic solution, Administer 1 drop into the left eye 4 (four) times a day, Disp: , Rfl:     levothyroxine (Euthyrox) 88 mcg tablet, 1 tab 2 days of the week, Disp: , Rfl:     levothyroxine 75 mcg tablet, 1 tab 5 days of the week, Disp: , Rfl:     meclizine (ANTIVERT) 25 mg tablet, Take 1 tablet (25 mg total) by mouth 3 (three) times a day as needed for dizziness, Disp: 30 tablet, Rfl: 0    Multiple Vitamin (multivitamin) tablet, Take 1 tablet by mouth in the morning., Disp: , Rfl:     prednisoLONE acetate (PRED FORTE) 1 % ophthalmic suspension, Administer 1 drop into the left eye 4 (four) times a day, Disp: , Rfl:   [2]   Patient Active Problem List  Diagnosis    Hypothyroidism    Greater trochanteric bursitis of left hip    SVT (supraventricular tachycardia) (HCC)    Primary insomnia    Vitamin D deficiency    Mixed hyperlipidemia    Breast pain, left    Arthritis    Anxiety

## 2025-08-18 ENCOUNTER — NURSE TRIAGE (OUTPATIENT)
Age: 75
End: 2025-08-18

## 2025-08-20 ENCOUNTER — OFFICE VISIT (OUTPATIENT)
Dept: FAMILY MEDICINE CLINIC | Facility: CLINIC | Age: 75
End: 2025-08-20
Payer: COMMERCIAL

## 2025-08-20 VITALS
SYSTOLIC BLOOD PRESSURE: 132 MMHG | WEIGHT: 147 LBS | HEART RATE: 57 BPM | RESPIRATION RATE: 18 BRPM | BODY MASS INDEX: 26.05 KG/M2 | TEMPERATURE: 96.1 F | DIASTOLIC BLOOD PRESSURE: 82 MMHG | HEIGHT: 63 IN

## 2025-08-20 DIAGNOSIS — R07.89 CHEST TIGHTNESS: ICD-10-CM

## 2025-08-20 DIAGNOSIS — F41.8 SITUATIONAL ANXIETY: Primary | ICD-10-CM

## 2025-08-20 PROCEDURE — 99214 OFFICE O/P EST MOD 30 MIN: CPT | Performed by: NURSE PRACTITIONER

## 2025-08-20 RX ORDER — HYDROXYZINE HYDROCHLORIDE 25 MG/1
25 TABLET, FILM COATED ORAL DAILY PRN
Qty: 30 TABLET | Refills: 0 | Status: SHIPPED | OUTPATIENT
Start: 2025-08-20

## (undated) DEVICE — ACTIVE FMS W/ INTREPID* ULTRA SLEEVES, 0.9MM 45° ABS* INTREPID* BALANCED TIP: Brand: ALCON

## (undated) DEVICE — B-H IRRIGATING CAN 19GA FLAT ANGLED 8MM: Brand: OPHTHALMIC CANNULA

## (undated) DEVICE — AIR INJECT CANNULA 27GA: Brand: OPHTHALMIC CANNULA

## (undated) DEVICE — GLOVE SRG BIOGEL 7.5

## (undated) DEVICE — CENTURION VISION SYSTEM FMS

## (undated) DEVICE — THE MONARCH® "D" CARTRIDGE IS A SINGLE-USE POLYPROPYLENE CARTRIDGE FOR POSTERIOR CHAMBER IOL DELIVERY: Brand: MONARCH® III

## (undated) DEVICE — EYE PACK CUSTOM -FINNEGAN

## (undated) DEVICE — CLEARCUT® SLIT KNIFE INTREPID MICRO-COAXIAL SYSTEM 2.4 SB: Brand: CLEARCUT®; INTREPID

## (undated) DEVICE — STERILE UV AND BLUE LIGHT FILTERING ACRYLIC FOLDABLE ASPHERIC POSTERIOR CHAMBER INTRAOCULAR LENS
Type: IMPLANTABLE DEVICE | Site: EYE | Status: NON-FUNCTIONAL
Brand: CLAREON

## (undated) DEVICE — MICROSURGICAL INSTRUMENT IRR. CYSTITOME 25GA STRAIGHT-REVERSE CUTTING: Brand: ALCON

## (undated) DEVICE — INTREPID® TRANSFORMER IA HP: Brand: INTREPID®